# Patient Record
Sex: MALE | Race: WHITE | NOT HISPANIC OR LATINO | Employment: FULL TIME | URBAN - METROPOLITAN AREA
[De-identification: names, ages, dates, MRNs, and addresses within clinical notes are randomized per-mention and may not be internally consistent; named-entity substitution may affect disease eponyms.]

---

## 2017-01-16 ENCOUNTER — APPOINTMENT (OUTPATIENT)
Dept: PHYSICAL THERAPY | Facility: CLINIC | Age: 62
End: 2017-01-16
Payer: COMMERCIAL

## 2017-01-16 PROCEDURE — 97140 MANUAL THERAPY 1/> REGIONS: CPT

## 2017-01-16 PROCEDURE — 97162 PT EVAL MOD COMPLEX 30 MIN: CPT

## 2017-01-24 ENCOUNTER — APPOINTMENT (OUTPATIENT)
Dept: PHYSICAL THERAPY | Facility: CLINIC | Age: 62
End: 2017-01-24
Payer: COMMERCIAL

## 2017-01-24 PROCEDURE — 97140 MANUAL THERAPY 1/> REGIONS: CPT

## 2017-01-24 PROCEDURE — 97110 THERAPEUTIC EXERCISES: CPT

## 2017-01-26 ENCOUNTER — APPOINTMENT (OUTPATIENT)
Dept: PHYSICAL THERAPY | Facility: CLINIC | Age: 62
End: 2017-01-26
Payer: COMMERCIAL

## 2017-01-26 PROCEDURE — 97140 MANUAL THERAPY 1/> REGIONS: CPT

## 2017-01-26 PROCEDURE — 97110 THERAPEUTIC EXERCISES: CPT

## 2017-02-01 ENCOUNTER — APPOINTMENT (OUTPATIENT)
Dept: PHYSICAL THERAPY | Facility: CLINIC | Age: 62
End: 2017-02-01
Payer: COMMERCIAL

## 2017-02-01 PROCEDURE — 97140 MANUAL THERAPY 1/> REGIONS: CPT

## 2017-02-01 PROCEDURE — 97110 THERAPEUTIC EXERCISES: CPT

## 2017-02-02 ENCOUNTER — APPOINTMENT (OUTPATIENT)
Dept: PHYSICAL THERAPY | Facility: CLINIC | Age: 62
End: 2017-02-02
Payer: COMMERCIAL

## 2017-02-06 ENCOUNTER — HOSPITAL ENCOUNTER (OUTPATIENT)
Dept: RADIOLOGY | Facility: HOSPITAL | Age: 62
Discharge: HOME | End: 2017-02-06
Attending: ORTHOPAEDIC SURGERY
Payer: COMMERCIAL

## 2017-02-06 ENCOUNTER — ALLSCRIPTS OFFICE VISIT (OUTPATIENT)
Dept: OTHER | Facility: OTHER | Age: 62
End: 2017-02-06

## 2017-02-06 DIAGNOSIS — M25.521 PAIN IN RIGHT ELBOW: ICD-10-CM

## 2017-02-06 PROCEDURE — 73080 X-RAY EXAM OF ELBOW: CPT

## 2017-02-09 ENCOUNTER — APPOINTMENT (OUTPATIENT)
Dept: PHYSICAL THERAPY | Facility: CLINIC | Age: 62
End: 2017-02-09
Payer: COMMERCIAL

## 2017-02-09 PROCEDURE — 97140 MANUAL THERAPY 1/> REGIONS: CPT

## 2017-02-09 PROCEDURE — 97110 THERAPEUTIC EXERCISES: CPT

## 2017-02-16 ENCOUNTER — APPOINTMENT (OUTPATIENT)
Dept: PHYSICAL THERAPY | Facility: CLINIC | Age: 62
End: 2017-02-16
Payer: COMMERCIAL

## 2017-03-31 ENCOUNTER — GENERIC CONVERSION - ENCOUNTER (OUTPATIENT)
Dept: OTHER | Facility: OTHER | Age: 62
End: 2017-03-31

## 2017-03-31 DIAGNOSIS — M77.11 LATERAL EPICONDYLITIS OF RIGHT ELBOW: ICD-10-CM

## 2017-04-05 ENCOUNTER — HOSPITAL ENCOUNTER (OUTPATIENT)
Dept: MRI IMAGING | Facility: HOSPITAL | Age: 62
Discharge: HOME | End: 2017-04-05
Payer: COMMERCIAL

## 2017-04-05 DIAGNOSIS — M77.11 LATERAL EPICONDYLITIS OF RIGHT ELBOW: ICD-10-CM

## 2017-04-05 PROCEDURE — 73221 MRI JOINT UPR EXTREM W/O DYE: CPT

## 2017-04-24 ENCOUNTER — GENERIC CONVERSION - ENCOUNTER (OUTPATIENT)
Dept: OTHER | Facility: OTHER | Age: 62
End: 2017-04-24

## 2017-05-09 ENCOUNTER — APPOINTMENT (OUTPATIENT)
Dept: OCCUPATIONAL THERAPY | Facility: CLINIC | Age: 62
End: 2017-05-09
Payer: COMMERCIAL

## 2017-05-09 DIAGNOSIS — M77.11 LATERAL EPICONDYLITIS OF RIGHT ELBOW: ICD-10-CM

## 2017-05-09 PROCEDURE — 97165 OT EVAL LOW COMPLEX 30 MIN: CPT

## 2017-05-09 PROCEDURE — 97140 MANUAL THERAPY 1/> REGIONS: CPT

## 2017-05-11 ENCOUNTER — APPOINTMENT (OUTPATIENT)
Dept: OCCUPATIONAL THERAPY | Facility: CLINIC | Age: 62
End: 2017-05-11
Payer: COMMERCIAL

## 2017-05-11 PROCEDURE — 97014 ELECTRIC STIMULATION THERAPY: CPT

## 2017-05-11 PROCEDURE — G0283 ELEC STIM OTHER THAN WOUND: HCPCS

## 2017-05-11 PROCEDURE — 97110 THERAPEUTIC EXERCISES: CPT

## 2017-05-11 PROCEDURE — 97140 MANUAL THERAPY 1/> REGIONS: CPT

## 2017-05-11 PROCEDURE — 97035 APP MDLTY 1+ULTRASOUND EA 15: CPT

## 2017-05-16 ENCOUNTER — APPOINTMENT (OUTPATIENT)
Dept: OCCUPATIONAL THERAPY | Facility: CLINIC | Age: 62
End: 2017-05-16
Payer: COMMERCIAL

## 2017-05-16 PROCEDURE — 97014 ELECTRIC STIMULATION THERAPY: CPT

## 2017-05-16 PROCEDURE — 97110 THERAPEUTIC EXERCISES: CPT

## 2017-05-16 PROCEDURE — 97035 APP MDLTY 1+ULTRASOUND EA 15: CPT

## 2017-05-16 PROCEDURE — 97140 MANUAL THERAPY 1/> REGIONS: CPT

## 2017-05-16 PROCEDURE — G0283 ELEC STIM OTHER THAN WOUND: HCPCS

## 2017-05-18 ENCOUNTER — APPOINTMENT (OUTPATIENT)
Dept: OCCUPATIONAL THERAPY | Facility: CLINIC | Age: 62
End: 2017-05-18
Payer: COMMERCIAL

## 2017-05-18 PROCEDURE — 97035 APP MDLTY 1+ULTRASOUND EA 15: CPT

## 2017-05-18 PROCEDURE — 97140 MANUAL THERAPY 1/> REGIONS: CPT

## 2017-05-18 PROCEDURE — 97110 THERAPEUTIC EXERCISES: CPT

## 2017-05-22 ENCOUNTER — APPOINTMENT (OUTPATIENT)
Dept: OCCUPATIONAL THERAPY | Facility: CLINIC | Age: 62
End: 2017-05-22
Payer: COMMERCIAL

## 2017-05-22 PROCEDURE — 97140 MANUAL THERAPY 1/> REGIONS: CPT

## 2017-05-22 PROCEDURE — 97110 THERAPEUTIC EXERCISES: CPT

## 2017-05-23 ENCOUNTER — APPOINTMENT (OUTPATIENT)
Dept: OCCUPATIONAL THERAPY | Facility: CLINIC | Age: 62
End: 2017-05-23
Payer: COMMERCIAL

## 2017-05-26 ENCOUNTER — APPOINTMENT (OUTPATIENT)
Dept: OCCUPATIONAL THERAPY | Facility: CLINIC | Age: 62
End: 2017-05-26
Payer: COMMERCIAL

## 2017-05-26 PROCEDURE — 97035 APP MDLTY 1+ULTRASOUND EA 15: CPT

## 2017-05-26 PROCEDURE — 97140 MANUAL THERAPY 1/> REGIONS: CPT

## 2017-05-30 ENCOUNTER — APPOINTMENT (OUTPATIENT)
Dept: OCCUPATIONAL THERAPY | Facility: CLINIC | Age: 62
End: 2017-05-30
Payer: COMMERCIAL

## 2017-05-30 PROCEDURE — 97110 THERAPEUTIC EXERCISES: CPT

## 2017-05-30 PROCEDURE — 97035 APP MDLTY 1+ULTRASOUND EA 15: CPT

## 2017-05-30 PROCEDURE — 97140 MANUAL THERAPY 1/> REGIONS: CPT

## 2017-05-31 ENCOUNTER — APPOINTMENT (OUTPATIENT)
Dept: OCCUPATIONAL THERAPY | Facility: CLINIC | Age: 62
End: 2017-05-31
Payer: COMMERCIAL

## 2017-06-06 ENCOUNTER — APPOINTMENT (OUTPATIENT)
Dept: OCCUPATIONAL THERAPY | Facility: CLINIC | Age: 62
End: 2017-06-06
Payer: COMMERCIAL

## 2017-06-06 PROCEDURE — 97035 APP MDLTY 1+ULTRASOUND EA 15: CPT

## 2017-06-06 PROCEDURE — 97140 MANUAL THERAPY 1/> REGIONS: CPT

## 2017-06-06 PROCEDURE — 97110 THERAPEUTIC EXERCISES: CPT

## 2017-06-13 ENCOUNTER — APPOINTMENT (OUTPATIENT)
Dept: OCCUPATIONAL THERAPY | Facility: CLINIC | Age: 62
End: 2017-06-13
Payer: COMMERCIAL

## 2017-06-22 ENCOUNTER — APPOINTMENT (OUTPATIENT)
Dept: OCCUPATIONAL THERAPY | Facility: CLINIC | Age: 62
End: 2017-06-22
Payer: COMMERCIAL

## 2017-07-18 ENCOUNTER — APPOINTMENT (OUTPATIENT)
Dept: PHYSICAL THERAPY | Facility: CLINIC | Age: 62
End: 2017-07-18
Payer: COMMERCIAL

## 2017-07-18 PROCEDURE — 97161 PT EVAL LOW COMPLEX 20 MIN: CPT

## 2017-07-18 PROCEDURE — 97110 THERAPEUTIC EXERCISES: CPT

## 2017-07-20 ENCOUNTER — TRANSCRIBE ORDERS (OUTPATIENT)
Dept: LAB | Facility: HOSPITAL | Age: 62
End: 2017-07-20

## 2017-07-20 ENCOUNTER — APPOINTMENT (OUTPATIENT)
Dept: LAB | Facility: HOSPITAL | Age: 62
End: 2017-07-20
Payer: COMMERCIAL

## 2017-07-20 DIAGNOSIS — Z79.899 ENCOUNTER FOR LONG-TERM (CURRENT) USE OF OTHER MEDICATIONS: ICD-10-CM

## 2017-07-20 DIAGNOSIS — Z85.46 PERSONAL HISTORY OF MALIGNANT NEOPLASM OF PROSTATE: ICD-10-CM

## 2017-07-20 DIAGNOSIS — R73.01 IMPAIRED FASTING GLUCOSE: ICD-10-CM

## 2017-07-20 DIAGNOSIS — E78.00 PURE HYPERCHOLESTEROLEMIA: ICD-10-CM

## 2017-07-20 DIAGNOSIS — E78.00 PURE HYPERCHOLESTEROLEMIA: Primary | ICD-10-CM

## 2017-07-20 LAB
ALBUMIN SERPL BCP-MCNC: 3.9 G/DL (ref 3.5–5)
ALP SERPL-CCNC: 52 U/L (ref 46–116)
ALT SERPL W P-5'-P-CCNC: 23 U/L (ref 12–78)
ANION GAP SERPL CALCULATED.3IONS-SCNC: 4 MMOL/L (ref 4–13)
AST SERPL W P-5'-P-CCNC: 14 U/L (ref 5–45)
BACTERIA UR QL AUTO: NORMAL /HPF
BASOPHILS # BLD AUTO: 0.02 THOUSANDS/ΜL (ref 0–0.1)
BASOPHILS NFR BLD AUTO: 0 % (ref 0–1)
BILIRUB SERPL-MCNC: 0.82 MG/DL (ref 0.2–1)
BILIRUB UR QL STRIP: NEGATIVE
BUN SERPL-MCNC: 20 MG/DL (ref 5–25)
CALCIUM SERPL-MCNC: 9.1 MG/DL (ref 8.3–10.1)
CHLORIDE SERPL-SCNC: 103 MMOL/L (ref 100–108)
CHOLEST SERPL-MCNC: 224 MG/DL (ref 50–200)
CK SERPL-CCNC: 73 U/L (ref 39–308)
CLARITY UR: CLEAR
CO2 SERPL-SCNC: 31 MMOL/L (ref 21–32)
COLOR UR: YELLOW
CREAT SERPL-MCNC: 1.22 MG/DL (ref 0.6–1.3)
CREAT UR-MCNC: 59.5 MG/DL
EOSINOPHIL # BLD AUTO: 0.08 THOUSAND/ΜL (ref 0–0.61)
EOSINOPHIL NFR BLD AUTO: 1 % (ref 0–6)
ERYTHROCYTE [DISTWIDTH] IN BLOOD BY AUTOMATED COUNT: 12.2 % (ref 11.6–15.1)
EST. AVERAGE GLUCOSE BLD GHB EST-MCNC: 120 MG/DL
GFR SERPL CREATININE-BSD FRML MDRD: 60 ML/MIN/1.73SQ M
GLUCOSE P FAST SERPL-MCNC: 95 MG/DL (ref 65–99)
GLUCOSE UR STRIP-MCNC: NEGATIVE MG/DL
HBA1C MFR BLD: 5.8 % (ref 4.2–6.3)
HCT VFR BLD AUTO: 43.4 % (ref 36.5–49.3)
HDLC SERPL-MCNC: 90 MG/DL (ref 40–60)
HGB BLD-MCNC: 14.3 G/DL (ref 12–17)
HGB UR QL STRIP.AUTO: ABNORMAL
HYALINE CASTS #/AREA URNS LPF: NORMAL /LPF
KETONES UR STRIP-MCNC: NEGATIVE MG/DL
LDLC SERPL CALC-MCNC: 115 MG/DL (ref 0–100)
LEUKOCYTE ESTERASE UR QL STRIP: NEGATIVE
LYMPHOCYTES # BLD AUTO: 1.54 THOUSANDS/ΜL (ref 0.6–4.47)
LYMPHOCYTES NFR BLD AUTO: 28 % (ref 14–44)
MCH RBC QN AUTO: 29.8 PG (ref 26.8–34.3)
MCHC RBC AUTO-ENTMCNC: 32.9 G/DL (ref 31.4–37.4)
MCV RBC AUTO: 90 FL (ref 82–98)
MICROALBUMIN UR-MCNC: 5.6 MG/L (ref 0–20)
MICROALBUMIN/CREAT 24H UR: 9 MG/G CREATININE (ref 0–30)
MONOCYTES # BLD AUTO: 0.46 THOUSAND/ΜL (ref 0.17–1.22)
MONOCYTES NFR BLD AUTO: 8 % (ref 4–12)
NEUTROPHILS # BLD AUTO: 3.43 THOUSANDS/ΜL (ref 1.85–7.62)
NEUTS SEG NFR BLD AUTO: 63 % (ref 43–75)
NITRITE UR QL STRIP: NEGATIVE
NON-SQ EPI CELLS URNS QL MICRO: NORMAL /HPF
NRBC BLD AUTO-RTO: 0 /100 WBCS
PH UR STRIP.AUTO: 6.5 [PH] (ref 4.5–8)
PLATELET # BLD AUTO: 239 THOUSANDS/UL (ref 149–390)
PMV BLD AUTO: 9.3 FL (ref 8.9–12.7)
POTASSIUM SERPL-SCNC: 3.9 MMOL/L (ref 3.5–5.3)
PROT SERPL-MCNC: 7.3 G/DL (ref 6.4–8.2)
PROT UR STRIP-MCNC: NEGATIVE MG/DL
PSA SERPL-MCNC: <0.1 NG/ML (ref 0–4)
RBC # BLD AUTO: 4.8 MILLION/UL (ref 3.88–5.62)
RBC #/AREA URNS AUTO: NORMAL /HPF
SODIUM SERPL-SCNC: 138 MMOL/L (ref 136–145)
SP GR UR STRIP.AUTO: 1.01 (ref 1–1.03)
TRIGL SERPL-MCNC: 95 MG/DL
UROBILINOGEN UR QL STRIP.AUTO: 0.2 E.U./DL
WBC # BLD AUTO: 5.56 THOUSAND/UL (ref 4.31–10.16)
WBC #/AREA URNS AUTO: NORMAL /HPF

## 2017-07-20 PROCEDURE — 82570 ASSAY OF URINE CREATININE: CPT | Performed by: FAMILY MEDICINE

## 2017-07-20 PROCEDURE — 83036 HEMOGLOBIN GLYCOSYLATED A1C: CPT

## 2017-07-20 PROCEDURE — 80061 LIPID PANEL: CPT

## 2017-07-20 PROCEDURE — 36415 COLL VENOUS BLD VENIPUNCTURE: CPT

## 2017-07-20 PROCEDURE — G0103 PSA SCREENING: HCPCS

## 2017-07-20 PROCEDURE — 82550 ASSAY OF CK (CPK): CPT

## 2017-07-20 PROCEDURE — 80053 COMPREHEN METABOLIC PANEL: CPT

## 2017-07-20 PROCEDURE — 82043 UR ALBUMIN QUANTITATIVE: CPT | Performed by: FAMILY MEDICINE

## 2017-07-20 PROCEDURE — 81001 URINALYSIS AUTO W/SCOPE: CPT | Performed by: FAMILY MEDICINE

## 2017-07-20 PROCEDURE — 85025 COMPLETE CBC W/AUTO DIFF WBC: CPT

## 2017-07-28 ENCOUNTER — APPOINTMENT (OUTPATIENT)
Dept: PHYSICAL THERAPY | Facility: CLINIC | Age: 62
End: 2017-07-28
Payer: COMMERCIAL

## 2017-07-28 PROCEDURE — 97140 MANUAL THERAPY 1/> REGIONS: CPT

## 2017-07-28 PROCEDURE — 97035 APP MDLTY 1+ULTRASOUND EA 15: CPT

## 2017-07-28 PROCEDURE — 97110 THERAPEUTIC EXERCISES: CPT

## 2017-08-01 ENCOUNTER — APPOINTMENT (OUTPATIENT)
Dept: PHYSICAL THERAPY | Facility: CLINIC | Age: 62
End: 2017-08-01
Payer: COMMERCIAL

## 2017-08-16 ENCOUNTER — APPOINTMENT (OUTPATIENT)
Dept: LAB | Facility: HOSPITAL | Age: 62
End: 2017-08-16
Payer: COMMERCIAL

## 2017-08-16 ENCOUNTER — APPOINTMENT (OUTPATIENT)
Dept: PHYSICAL THERAPY | Facility: CLINIC | Age: 62
End: 2017-08-16
Payer: COMMERCIAL

## 2017-08-16 ENCOUNTER — TRANSCRIBE ORDERS (OUTPATIENT)
Dept: ADMINISTRATIVE | Facility: HOSPITAL | Age: 62
End: 2017-08-16

## 2017-08-16 DIAGNOSIS — R79.89 OTHER ABNORMAL BLOOD CHEMISTRY: ICD-10-CM

## 2017-08-16 DIAGNOSIS — R79.89 OTHER ABNORMAL BLOOD CHEMISTRY: Primary | ICD-10-CM

## 2017-08-16 LAB
ANION GAP SERPL CALCULATED.3IONS-SCNC: 7 MMOL/L (ref 4–13)
BUN SERPL-MCNC: 19 MG/DL (ref 5–25)
CALCIUM SERPL-MCNC: 8.7 MG/DL (ref 8.3–10.1)
CHLORIDE SERPL-SCNC: 103 MMOL/L (ref 100–108)
CO2 SERPL-SCNC: 31 MMOL/L (ref 21–32)
CREAT SERPL-MCNC: 1.19 MG/DL (ref 0.6–1.3)
GFR SERPL CREATININE-BSD FRML MDRD: 65 ML/MIN/1.73SQ M
GLUCOSE P FAST SERPL-MCNC: 87 MG/DL (ref 65–99)
POTASSIUM SERPL-SCNC: 3.7 MMOL/L (ref 3.5–5.3)
SODIUM SERPL-SCNC: 141 MMOL/L (ref 136–145)

## 2017-08-16 PROCEDURE — 80048 BASIC METABOLIC PNL TOTAL CA: CPT

## 2017-08-16 PROCEDURE — 97035 APP MDLTY 1+ULTRASOUND EA 15: CPT

## 2017-08-16 PROCEDURE — 36415 COLL VENOUS BLD VENIPUNCTURE: CPT

## 2017-08-16 PROCEDURE — 97140 MANUAL THERAPY 1/> REGIONS: CPT

## 2017-12-15 ENCOUNTER — ALLSCRIPTS OFFICE VISIT (OUTPATIENT)
Dept: OTHER | Facility: OTHER | Age: 62
End: 2017-12-15

## 2018-01-10 ENCOUNTER — APPOINTMENT (OUTPATIENT)
Dept: PHYSICAL THERAPY | Facility: CLINIC | Age: 63
End: 2018-01-10
Payer: COMMERCIAL

## 2018-01-10 PROCEDURE — G8990 OTHER PT/OT CURRENT STATUS: HCPCS

## 2018-01-10 PROCEDURE — 97161 PT EVAL LOW COMPLEX 20 MIN: CPT

## 2018-01-10 PROCEDURE — 97140 MANUAL THERAPY 1/> REGIONS: CPT

## 2018-01-10 PROCEDURE — G8991 OTHER PT/OT GOAL STATUS: HCPCS

## 2018-01-14 VITALS — HEART RATE: 58 BPM | DIASTOLIC BLOOD PRESSURE: 75 MMHG | WEIGHT: 187.25 LBS | SYSTOLIC BLOOD PRESSURE: 119 MMHG

## 2018-01-15 ENCOUNTER — APPOINTMENT (OUTPATIENT)
Dept: PHYSICAL THERAPY | Facility: CLINIC | Age: 63
End: 2018-01-15
Payer: COMMERCIAL

## 2018-01-16 NOTE — MISCELLANEOUS
Message  Patient had improvement with treatment, however symptoms returned localized to lateral epicondyle  He is indicated for an MRI of the right elbow  Will review results when it is preformed  Plan  Lateral epicondylitis of right elbow    · * MRI ELBOW RIGHT WO CONTRAST; Status:Need Information - Financial  Authorization;  Requested O:[id number];     Signatures   Electronically signed by : TJ Arevalo ; Mar 31 2017  1:23PM EST                       (Author)

## 2018-01-17 NOTE — CONSULTS
Therapy  Rehabilitation Services Referral: Therapy Location: Occupational Therapy  Patient Status: routine  Diagnosis: Right elbow lateral epicondylitis  Rehabilitation Services: evaluate and treat patient as needed and initiate a home exercise program  Modalities: Local modalities at the therapist's discretion  Exercise/Treatment: AROM/PROM, stretching, elbow, wrist/hand, eccentric, stabilization and strengthening/PRE  Program: home program    Frequency: 1-3 times per week, for 8 weeks  Please send progress report  I hereby certify that the services indicated above are medically necessary        Signatures   Electronically signed by : TJ Ferrari ; Apr 24 2017  1:23PM EST                       (Author)

## 2018-01-18 NOTE — PROGRESS NOTES
Chief Complaint  Patient here for EKG s/p initiation of Flecainide 50mg b i d  X3 weeks  Patient feeling much better with fewer palpitations  BP today is 132/82  HR 57  Patient on no other cardiac medication  Active Problems    1  Forehead abrasion (910 0) (S00 81XA)   2  Groin strain (848 8) (S76 219A)   3  Head injury (959 01) (S09 90XA)   4  Hypercholesterolemia (272 0) (E78 00)   5  Palpitations (785 1) (R00 2)   6  Premature atrial contractions (427 61) (I49 1)   7  Prostate cancer (185) (C61)   8  Right foot pain (729 5) (M79 671)   9  Rotator cuff tendinitis, left (726 10) (M75 82)   10  Strain of calf muscle (844 8) (B26 289B)    Current Meds   1  Aspirin Childrens 81 MG Oral Tablet Chewable; Therapy: (Recorded:98Qjv2659) to Recorded   2  Crestor 5 MG Oral Tablet; take 1 tablet 3 times per week; Therapy: (Recorded:41Jnb1148) to Recorded   3  Fish Oil CAPS; Therapy: (Recorded:40Ekn7301) to Recorded   4  Flecainide Acetate 50 MG Oral Tablet; Take 1 tablet twice daily; Therapy: 08RSE3567 to (Evaluate:62Heh9713)  Requested for: [id number]; Last   Rx:94Bcg7271 Ordered   5  Vitamin C 500 MG Oral Capsule; take 1 capsule daily; Therapy: 18ESM9875 to Recorded    Allergies    1   No Known Drug Allergies    Vitals  Signs    Systolic: 902, LUE, Sitting  Diastolic: 82, LUE, Sitting  Heart Rate: 57    Plan  Premature atrial contractions    · EKG/ECG- POC; Status:Complete;   Done: 92IIM4865    Signatures   Electronically signed by : Moncho Pena, ; Nov 7 2016  3:14PM EST                       (Author)    Electronically signed by : Hudson Ohara DO; Nov 15 2016  7:47PM EST                       (Author)

## 2018-01-23 NOTE — MISCELLANEOUS
Provider Comments  Provider Comments:   Pt was a no-show for today's apt        Signatures   Electronically signed by : Cee Francisco, ; Dec 15 2017  4:40PM EST                       (Administrative)

## 2018-04-17 ENCOUNTER — OFFICE VISIT (OUTPATIENT)
Dept: CARDIOLOGY CLINIC | Facility: CLINIC | Age: 63
End: 2018-04-17
Payer: COMMERCIAL

## 2018-04-17 VITALS
WEIGHT: 190 LBS | DIASTOLIC BLOOD PRESSURE: 72 MMHG | HEIGHT: 70 IN | BODY MASS INDEX: 27.2 KG/M2 | SYSTOLIC BLOOD PRESSURE: 128 MMHG

## 2018-04-17 DIAGNOSIS — I48.91 ATRIAL FIBRILLATION, UNSPECIFIED TYPE (HCC): Primary | ICD-10-CM

## 2018-04-17 PROCEDURE — 93000 ELECTROCARDIOGRAM COMPLETE: CPT | Performed by: INTERNAL MEDICINE

## 2018-04-17 PROCEDURE — 99213 OFFICE O/P EST LOW 20 MIN: CPT | Performed by: INTERNAL MEDICINE

## 2018-04-17 RX ORDER — ASPIRIN 81 MG/1
TABLET, CHEWABLE ORAL
COMMUNITY

## 2018-04-17 RX ORDER — FLECAINIDE ACETATE 50 MG/1
50 TABLET ORAL 2 TIMES DAILY
Refills: 3 | COMMUNITY
Start: 2018-03-01 | End: 2018-12-07 | Stop reason: SDUPTHER

## 2018-04-17 RX ORDER — FLECAINIDE ACETATE 50 MG/1
1 TABLET ORAL 2 TIMES DAILY
COMMUNITY
Start: 2016-10-17 | End: 2018-04-17 | Stop reason: SDUPTHER

## 2018-04-17 RX ORDER — ROSUVASTATIN CALCIUM 5 MG/1
TABLET, COATED ORAL
COMMUNITY
End: 2021-03-08 | Stop reason: SDUPTHER

## 2018-04-17 RX ORDER — ROSUVASTATIN CALCIUM 5 MG/1
TABLET, COATED ORAL
Refills: 1 | COMMUNITY
Start: 2018-03-31 | End: 2018-04-17 | Stop reason: SDUPTHER

## 2018-04-17 RX ORDER — AMOXICILLIN 500 MG
CAPSULE ORAL DAILY
COMMUNITY
End: 2021-01-20 | Stop reason: ALTCHOICE

## 2018-04-17 RX ORDER — OXYCODONE HYDROCHLORIDE AND ACETAMINOPHEN 5; 325 MG/1; MG/1
1 TABLET ORAL
Refills: 0 | COMMUNITY
Start: 2018-02-23 | End: 2018-04-17 | Stop reason: ALTCHOICE

## 2018-04-17 RX ORDER — MULTIVIT WITH MINERALS/LUTEIN
1 TABLET ORAL DAILY
COMMUNITY
Start: 2016-11-07 | End: 2021-01-20 | Stop reason: ALTCHOICE

## 2018-04-17 RX ORDER — AMOXICILLIN 500 MG/1
CAPSULE ORAL
Refills: 0 | COMMUNITY
Start: 2018-02-23 | End: 2018-04-17 | Stop reason: ALTCHOICE

## 2018-04-17 NOTE — PROGRESS NOTES
Cardiology Follow Up    Jennifer Castellanos  1955  [id number]  HEART & VASCULAR [address] [facility]  ST [address] [facility]  [address] [facility] [address]    1  Atrial fibrillation, unspecified type ([address] )  POCT ECG       Interval History: feels great one bout of palpitations in last 6 months  There is no problem list on file for this patient  No past medical history on file  Social History     Social History    Marital status: /Civil Union     Spouse name: N/A    Number of children: N/A    Years of education: N/A     Occupational History    Not on file  Social History Main Topics    Smoking status: Not on file    Smokeless tobacco: Not on file    Alcohol use Not on file    Drug use: Unknown    Sexual activity: Not on file     Other Topics Concern    Not on file     Social History Narrative    No narrative on file      No family history on file  No past surgical history on file  Current Outpatient Prescriptions:     Ascorbic Acid (VITAMIN C) 1000 MG tablet, Take 1 capsule by mouth daily, Disp: , Rfl:     aspirin 81 mg chewable tablet, Chew, Disp: , Rfl:     flecainide (TAMBOCOR) 50 mg tablet, Take 50 mg by mouth 2 (two) times a day, Disp: , Rfl: 3    Omega-3 Fatty Acids (FISH OIL) 1200 MG CAPS, Take by mouth daily  , Disp: , Rfl:     rosuvastatin (CRESTOR) 5 mg tablet, Take by mouth, Disp: , Rfl:   No Known Allergies    Labs:  No visits with results within 6 Month(s) from this visit  Latest known visit with results is:   Appointment on 08/16/2017   Component Date Value    Sodium 08/16/2017 141     Potassium 08/16/2017 3 7     Chloride 08/16/2017 103     CO2 08/16/2017 31     Anion Gap 08/16/2017 7     BUN 08/16/2017 19     Creatinine 08/16/2017 1 19     Glucose, Fasting 08/16/2017 87     Calcium 08/16/2017 8 7     eGFR 08/16/2017 65      Imaging: No results found      Review of Systems:  Review of Systems    10 point ROS negative for complaints      Physical Exam:  Physical Exam    GEN: NAD, Alert and oriented, well appearing  HEENT:Head, neck, ears, oral pharynx: Mucus membranes moist, oral pharynx clear, nares clear  External ears normal  EYES: Pupils equal, sclera anicteric  NECK: No JVD  CARDIOVASCULAR: RRR, No murmur, rub, gallops S1,S2  LUNGS: Clear To auscultation bilaterally  ABDOMEN: Soft, nondistended  EXTREMITIES/VASCULAR: No edema  PSYCH: Normal Affect  NEURO: Grossly intact, moving all extremiteis equal, face symetric  HEME: No bleeding, bruising, petechia  SKIN: No significant rashes      Discussion/Summary:1) PACs frequent completely suppressed  Continue flecainide 50 twice daily  Ok to go to once daily on weeks off if he wants try getting off medication  Will get stress echo and annual follow-up

## 2018-05-14 DIAGNOSIS — F51.02 INSOMNIA, TRANSIENT: Primary | ICD-10-CM

## 2018-05-14 RX ORDER — ZOLPIDEM TARTRATE 5 MG/1
5 TABLET ORAL
Qty: 30 TABLET | Refills: 0 | Status: SHIPPED | OUTPATIENT
Start: 2018-05-14 | End: 2020-01-31 | Stop reason: ALTCHOICE

## 2018-06-17 ENCOUNTER — OFFICE VISIT (OUTPATIENT)
Dept: URGENT CARE | Facility: CLINIC | Age: 63
End: 2018-06-17
Payer: COMMERCIAL

## 2018-06-17 VITALS
TEMPERATURE: 97.1 F | HEART RATE: 63 BPM | OXYGEN SATURATION: 97 % | BODY MASS INDEX: 27.55 KG/M2 | DIASTOLIC BLOOD PRESSURE: 86 MMHG | RESPIRATION RATE: 16 BRPM | SYSTOLIC BLOOD PRESSURE: 130 MMHG | HEIGHT: 69 IN | WEIGHT: 186 LBS

## 2018-06-17 DIAGNOSIS — H10.31 ACUTE BACTERIAL CONJUNCTIVITIS OF RIGHT EYE: Primary | ICD-10-CM

## 2018-06-17 DIAGNOSIS — J32.9 VIRAL SINUSITIS: ICD-10-CM

## 2018-06-17 DIAGNOSIS — B97.89 VIRAL SINUSITIS: ICD-10-CM

## 2018-06-17 PROCEDURE — 99213 OFFICE O/P EST LOW 20 MIN: CPT | Performed by: PHYSICIAN ASSISTANT

## 2018-06-17 RX ORDER — TOBRAMYCIN 3 MG/ML
1 SOLUTION/ DROPS OPHTHALMIC
Qty: 1 BOTTLE | Refills: 0 | Status: SHIPPED | OUTPATIENT
Start: 2018-06-17 | End: 2019-06-05 | Stop reason: ALTCHOICE

## 2018-06-17 NOTE — PATIENT INSTRUCTIONS
Use eyedrops as directed  Apply warm compresses as needed for comfort   Take antihistamine  F/u with PCP if no improvement in 3-4 days

## 2018-06-17 NOTE — PROGRESS NOTES
NAME: Fernanda Turner is a 57 y o  male  : 1955    MRN: [id number]      Assessment and Plan   Acute bacterial conjunctivitis of right eye [H10 31]  1  Acute bacterial conjunctivitis of right eye  tobramycin (TOBREX) 0 3 % SOLN   2  Viral sinusitis             Patient Instructions   Patient Instructions   Use eyedrops as directed  Apply warm compresses as needed for comfort   Take antihistamine  F/u with PCP if no improvement in 3-4 days    Proceed to ER if symptoms worsen  History of Present Illness     Patient presents complaining of 1 day hx of crusty and itchy eyes R>L  He states he has had a cough and congestion for a few days and has been taking mucinex but noticed his eye yesterday starting to become red and itchy  He work up this am and had trouble opening his right eye due to the crusting  He states his daughter recently had pink eye  He denies fevers, chills  Review of Systems   Review of Systems   Constitutional: Negative for chills and fever  HENT: Positive for congestion and postnasal drip  Negative for ear pain  Eyes: Positive for discharge, redness and itching  Negative for visual disturbance  Respiratory: Positive for cough  Negative for shortness of breath, wheezing and stridor            Current Medications       Current Outpatient Prescriptions:     Ascorbic Acid (VITAMIN C) 1000 MG tablet, Take 1 capsule by mouth daily, Disp: , Rfl:     aspirin 81 mg chewable tablet, Chew, Disp: , Rfl:     flecainide (TAMBOCOR) 50 mg tablet, Take 50 mg by mouth 2 (two) times a day, Disp: , Rfl: 3    Omega-3 Fatty Acids (FISH OIL) 1200 MG CAPS, Take by mouth daily  , Disp: , Rfl:     rosuvastatin (CRESTOR) 5 mg tablet, Take by mouth, Disp: , Rfl:     tobramycin (TOBREX) 0 3 % SOLN, Administer 1 drop to both eyes every 4 (four) hours while awake, Disp: 1 Bottle, Rfl: 0    zolpidem (AMBIEN) 5 mg tablet, Take 1 tablet (5 mg total) by mouth daily at bedtime as needed for sleep for up to 10 doses, Disp: 30 tablet, Rfl: 0    Current Allergies     Allergies as of 06/17/2018    (No Known Allergies)              No past medical history on file  No past surgical history on file  No family history on file  Medications have been verified  The following portions of the patient's history were reviewed and updated as appropriate: allergies, current medications, past family history, past medical history, past social history, past surgical history and problem list     Objective   /86   Pulse 63   Temp (!) 97 1 °F (36 2 °C)   Resp 16   Ht 5' 9" (1 753 m)   Wt 84 4 kg (186 lb)   SpO2 97%   BMI 27 47 kg/m²      Physical Exam     Physical Exam   Constitutional: He appears well-developed and well-nourished  No distress  HENT:   TMs clear b/l  Nasal mucosa pale and mildly edematous  Clear rhinorrhea  Oropharynx clear without edema or exudate  +PND   Eyes:   Right eye: conjunctiva erythematous with mucopurulent discharge  Slightly injected  Left eye: conjunctiva erythematous but without mucopurulent discharge  Yellow crusting noted along the lid margin

## 2018-07-30 ENCOUNTER — APPOINTMENT (OUTPATIENT)
Dept: LAB | Facility: CLINIC | Age: 63
End: 2018-07-30
Payer: COMMERCIAL

## 2018-07-30 ENCOUNTER — TRANSCRIBE ORDERS (OUTPATIENT)
Dept: LAB | Facility: CLINIC | Age: 63
End: 2018-07-30

## 2018-07-30 DIAGNOSIS — Z79.899 LONG TERM USE OF DRUG: ICD-10-CM

## 2018-07-30 DIAGNOSIS — E78.00 PURE HYPERCHOLESTEROLEMIA: ICD-10-CM

## 2018-07-30 DIAGNOSIS — R73.01 IMPAIRED FASTING GLUCOSE: Primary | ICD-10-CM

## 2018-07-30 DIAGNOSIS — Z85.46 PERSONAL HISTORY OF MALIGNANT NEOPLASM OF PROSTATE: ICD-10-CM

## 2018-07-30 DIAGNOSIS — R73.01 IMPAIRED FASTING GLUCOSE: ICD-10-CM

## 2018-07-30 DIAGNOSIS — R79.89 HYPOURICEMIA: ICD-10-CM

## 2018-07-30 LAB
ALBUMIN SERPL BCP-MCNC: 4.3 G/DL (ref 3.5–5)
ALP SERPL-CCNC: 55 U/L (ref 46–116)
ALT SERPL W P-5'-P-CCNC: 32 U/L (ref 12–78)
ANION GAP SERPL CALCULATED.3IONS-SCNC: 7 MMOL/L (ref 4–13)
AST SERPL W P-5'-P-CCNC: 21 U/L (ref 5–45)
BACTERIA UR QL AUTO: NORMAL /HPF
BASOPHILS # BLD AUTO: 0.02 THOUSANDS/ΜL (ref 0–0.1)
BASOPHILS NFR BLD AUTO: 0 % (ref 0–1)
BILIRUB SERPL-MCNC: 0.76 MG/DL (ref 0.2–1)
BILIRUB UR QL STRIP: NEGATIVE
BUN SERPL-MCNC: 18 MG/DL (ref 5–25)
CALCIUM SERPL-MCNC: 9.1 MG/DL (ref 8.3–10.1)
CHLORIDE SERPL-SCNC: 101 MMOL/L (ref 100–108)
CHOLEST SERPL-MCNC: 197 MG/DL (ref 50–200)
CK SERPL-CCNC: 115 U/L (ref 39–308)
CLARITY UR: CLEAR
CO2 SERPL-SCNC: 27 MMOL/L (ref 21–32)
COLOR UR: YELLOW
CREAT SERPL-MCNC: 1.51 MG/DL (ref 0.6–1.3)
CREAT UR-MCNC: 14.9 MG/DL
EOSINOPHIL # BLD AUTO: 0.09 THOUSAND/ΜL (ref 0–0.61)
EOSINOPHIL NFR BLD AUTO: 2 % (ref 0–6)
ERYTHROCYTE [DISTWIDTH] IN BLOOD BY AUTOMATED COUNT: 12.1 % (ref 11.6–15.1)
EST. AVERAGE GLUCOSE BLD GHB EST-MCNC: 117 MG/DL
GFR SERPL CREATININE-BSD FRML MDRD: 48 ML/MIN/1.73SQ M
GLUCOSE P FAST SERPL-MCNC: 104 MG/DL (ref 65–99)
GLUCOSE UR STRIP-MCNC: NEGATIVE MG/DL
HBA1C MFR BLD: 5.7 % (ref 4.2–6.3)
HCT VFR BLD AUTO: 45.8 % (ref 36.5–49.3)
HDLC SERPL-MCNC: 81 MG/DL (ref 40–60)
HGB BLD-MCNC: 14.5 G/DL (ref 12–17)
HGB UR QL STRIP.AUTO: NEGATIVE
HYALINE CASTS #/AREA URNS LPF: NORMAL /LPF
IMM GRANULOCYTES # BLD AUTO: 0.04 THOUSAND/UL (ref 0–0.2)
IMM GRANULOCYTES NFR BLD AUTO: 1 % (ref 0–2)
KETONES UR STRIP-MCNC: NEGATIVE MG/DL
LDLC SERPL CALC-MCNC: 98 MG/DL (ref 0–100)
LEUKOCYTE ESTERASE UR QL STRIP: NEGATIVE
LYMPHOCYTES # BLD AUTO: 1.61 THOUSANDS/ΜL (ref 0.6–4.47)
LYMPHOCYTES NFR BLD AUTO: 30 % (ref 14–44)
MCH RBC QN AUTO: 29.2 PG (ref 26.8–34.3)
MCHC RBC AUTO-ENTMCNC: 31.7 G/DL (ref 31.4–37.4)
MCV RBC AUTO: 92 FL (ref 82–98)
MICROALBUMIN UR-MCNC: <5 MG/L (ref 0–20)
MICROALBUMIN/CREAT 24H UR: <34 MG/G CREATININE (ref 0–30)
MONOCYTES # BLD AUTO: 0.58 THOUSAND/ΜL (ref 0.17–1.22)
MONOCYTES NFR BLD AUTO: 11 % (ref 4–12)
NEUTROPHILS # BLD AUTO: 3.08 THOUSANDS/ΜL (ref 1.85–7.62)
NEUTS SEG NFR BLD AUTO: 56 % (ref 43–75)
NITRITE UR QL STRIP: NEGATIVE
NON-SQ EPI CELLS URNS QL MICRO: NORMAL /HPF
NONHDLC SERPL-MCNC: 116 MG/DL
NRBC BLD AUTO-RTO: 0 /100 WBCS
PH UR STRIP.AUTO: 6.5 [PH] (ref 4.5–8)
PLATELET # BLD AUTO: 284 THOUSANDS/UL (ref 149–390)
PMV BLD AUTO: 9.4 FL (ref 8.9–12.7)
POTASSIUM SERPL-SCNC: 4.1 MMOL/L (ref 3.5–5.3)
PROT SERPL-MCNC: 7.7 G/DL (ref 6.4–8.2)
PROT UR STRIP-MCNC: NEGATIVE MG/DL
PSA SERPL-MCNC: <0.1 NG/ML (ref 0–4)
RBC # BLD AUTO: 4.97 MILLION/UL (ref 3.88–5.62)
RBC #/AREA URNS AUTO: NORMAL /HPF
SODIUM SERPL-SCNC: 135 MMOL/L (ref 136–145)
SP GR UR STRIP.AUTO: 1 (ref 1–1.03)
TRIGL SERPL-MCNC: 88 MG/DL
UROBILINOGEN UR QL STRIP.AUTO: 0.2 E.U./DL
WBC # BLD AUTO: 5.42 THOUSAND/UL (ref 4.31–10.16)
WBC #/AREA URNS AUTO: NORMAL /HPF

## 2018-07-30 PROCEDURE — 80053 COMPREHEN METABOLIC PANEL: CPT

## 2018-07-30 PROCEDURE — 36415 COLL VENOUS BLD VENIPUNCTURE: CPT

## 2018-07-30 PROCEDURE — 81001 URINALYSIS AUTO W/SCOPE: CPT

## 2018-07-30 PROCEDURE — G0103 PSA SCREENING: HCPCS

## 2018-07-30 PROCEDURE — 85025 COMPLETE CBC W/AUTO DIFF WBC: CPT

## 2018-07-30 PROCEDURE — 82570 ASSAY OF URINE CREATININE: CPT

## 2018-07-30 PROCEDURE — 80061 LIPID PANEL: CPT

## 2018-07-30 PROCEDURE — 83036 HEMOGLOBIN GLYCOSYLATED A1C: CPT

## 2018-07-30 PROCEDURE — 82550 ASSAY OF CK (CPK): CPT

## 2018-07-30 PROCEDURE — 82043 UR ALBUMIN QUANTITATIVE: CPT

## 2018-08-09 ENCOUNTER — APPOINTMENT (OUTPATIENT)
Dept: LAB | Facility: HOSPITAL | Age: 63
End: 2018-08-09
Payer: COMMERCIAL

## 2018-08-09 DIAGNOSIS — Z79.899 LONG TERM USE OF DRUG: ICD-10-CM

## 2018-08-09 DIAGNOSIS — Z85.46 PERSONAL HISTORY OF MALIGNANT NEOPLASM OF PROSTATE: ICD-10-CM

## 2018-08-09 DIAGNOSIS — E78.00 PURE HYPERCHOLESTEROLEMIA: ICD-10-CM

## 2018-08-09 DIAGNOSIS — R79.89 HYPOURICEMIA: ICD-10-CM

## 2018-08-09 DIAGNOSIS — R73.01 IMPAIRED FASTING GLUCOSE: ICD-10-CM

## 2018-08-09 LAB
ANION GAP SERPL CALCULATED.3IONS-SCNC: 6 MMOL/L (ref 4–13)
BACTERIA UR QL AUTO: NORMAL /HPF
BILIRUB UR QL STRIP: NEGATIVE
BUN SERPL-MCNC: 20 MG/DL (ref 5–25)
CALCIUM SERPL-MCNC: 8.8 MG/DL (ref 8.3–10.1)
CHLORIDE SERPL-SCNC: 104 MMOL/L (ref 100–108)
CK SERPL-CCNC: 77 U/L (ref 39–308)
CLARITY UR: CLEAR
CO2 SERPL-SCNC: 28 MMOL/L (ref 21–32)
COLOR UR: YELLOW
CREAT SERPL-MCNC: 1.19 MG/DL (ref 0.6–1.3)
CREAT UR-MCNC: 217 MG/DL
GFR SERPL CREATININE-BSD FRML MDRD: 64 ML/MIN/1.73SQ M
GLUCOSE SERPL-MCNC: 118 MG/DL (ref 65–140)
GLUCOSE UR STRIP-MCNC: NEGATIVE MG/DL
HGB UR QL STRIP.AUTO: NEGATIVE
HYALINE CASTS #/AREA URNS LPF: NORMAL /LPF
KETONES UR STRIP-MCNC: NEGATIVE MG/DL
LEUKOCYTE ESTERASE UR QL STRIP: NEGATIVE
NITRITE UR QL STRIP: NEGATIVE
NON-SQ EPI CELLS URNS QL MICRO: NORMAL /HPF
PH UR STRIP.AUTO: 5 [PH] (ref 4.5–8)
POTASSIUM SERPL-SCNC: 4 MMOL/L (ref 3.5–5.3)
PROT UR STRIP-MCNC: NEGATIVE MG/DL
PROT UR-MCNC: 22 MG/DL
PROT/CREAT UR: 0.1 MG/G{CREAT} (ref 0–0.1)
RBC #/AREA URNS AUTO: NORMAL /HPF
SODIUM SERPL-SCNC: 138 MMOL/L (ref 136–145)
SP GR UR STRIP.AUTO: 1.02 (ref 1–1.03)
UROBILINOGEN UR QL STRIP.AUTO: 0.2 E.U./DL
WBC #/AREA URNS AUTO: NORMAL /HPF

## 2018-08-09 PROCEDURE — 84156 ASSAY OF PROTEIN URINE: CPT

## 2018-08-09 PROCEDURE — 36415 COLL VENOUS BLD VENIPUNCTURE: CPT

## 2018-08-09 PROCEDURE — 82570 ASSAY OF URINE CREATININE: CPT

## 2018-08-09 PROCEDURE — 82550 ASSAY OF CK (CPK): CPT

## 2018-08-09 PROCEDURE — 80048 BASIC METABOLIC PNL TOTAL CA: CPT

## 2018-08-09 PROCEDURE — 81001 URINALYSIS AUTO W/SCOPE: CPT

## 2018-08-17 ENCOUNTER — OFFICE VISIT (OUTPATIENT)
Dept: PHYSICAL THERAPY | Facility: CLINIC | Age: 63
End: 2018-08-17
Payer: COMMERCIAL

## 2018-08-17 DIAGNOSIS — S76.302D LEFT HAMSTRING INJURY, SUBSEQUENT ENCOUNTER: Primary | ICD-10-CM

## 2018-08-17 PROCEDURE — G8978 MOBILITY CURRENT STATUS: HCPCS | Performed by: PHYSICAL THERAPIST

## 2018-08-17 PROCEDURE — 97161 PT EVAL LOW COMPLEX 20 MIN: CPT | Performed by: PHYSICAL THERAPIST

## 2018-08-17 PROCEDURE — G8979 MOBILITY GOAL STATUS: HCPCS | Performed by: PHYSICAL THERAPIST

## 2018-08-17 PROCEDURE — 97140 MANUAL THERAPY 1/> REGIONS: CPT | Performed by: PHYSICAL THERAPIST

## 2018-08-17 NOTE — PROGRESS NOTES
PT Evaluation     Today's date: 2018  Patient name: Jay Carter  : 1955  MRN: [id number]  Referring provider: Marguerite Blackwood PT  Dx: No diagnosis found  Assessment  Impairments: abnormal gait, abnormal or restricted ROM, activity intolerance, impaired physical strength, lacks appropriate home exercise program and pain with function    Assessment details: Jay Carter is a 57 y o  male presenting to outpatient physical therapy at [place] [address] with complaints of L H/S pain following a running injury on 18  He presents with decreased range of motion, decreased strength, limited flexibility, decreased tolerance to activity and decreased functional mobility due to a left hamstring strain  He would benefit from skilled PT services in order to address these deficits and reach maximum level of function  Thank you for the referral!  Barriers to therapy: None  Understanding of Dx/Px/POC: excellent  Goals  ST  Independent with HEP in 2 weeks  2  Increase L H/S flexibility to WNL in 2 weeks     LT  Achieve FOTO score of 80/100 in 4 weeks   2  Able to run without L H/S pain in 4 weeks  3  Strength L H/S = 5/5 in 4 weeks  4  No L H/S tightness in 4 weeks    Plan  Patient would benefit from: skilled PT  Planned modality interventions: cryotherapy, electrical stimulation/Russian stimulation, TENS and thermotherapy: hydrocollator packs  Planned therapy interventions: ADL retraining, balance/weight bearing training, flexibility, functional ROM exercises, home exercise program, joint mobilization, manual therapy, neuromuscular re-education, postural training, strengthening, stretching, therapeutic activities and therapeutic exercise  Frequency: 2x week  Duration in weeks: 4  Treatment plan discussed with: patient        Subjective    Objective     Palpation     Additional Palpation Details  Mod tightness and tenderness mid medial L H/S      Neurological Testing     Sensation     Knee Left Knee   Intact: light touch    Right Knee   Intact: light touch     Active Range of Motion   Left Knee   Normal active range of motion    Strength/Myotome Testing     Left Knee   Prone flexion: 4+  Extension: 5    Right Knee   Prone flexion: 5  Extension: 5    Ambulation     Observational Gait   Gait: within functional limits       Flowsheet Rows      Most Recent Value   PT/OT G-Codes   Current Score  60   Projected Score  80   FOTO information reviewed  Yes   Assessment Type  Evaluation   G code set  Mobility: Walking & Moving Around   Mobility: Walking and Moving Around Current Status ()  CK   Mobility: Walking and Moving Around Goal Status ()  CJ        Daily Treatment Diary     Dx:  No diagnosis found    POC EXPIRES On:  9/16/18  PRECAUTIONS:  None  CO-MORBIDITES:  None  PERSONAL FACTORS:  None    Manual  8/17            Graston L H/S 10'            STM L H/S 5'                                                       Exercise Diary                                                                                                                                                                                                                                                                                      Modalities

## 2018-08-22 ENCOUNTER — OFFICE VISIT (OUTPATIENT)
Dept: PHYSICAL THERAPY | Facility: CLINIC | Age: 63
End: 2018-08-22
Payer: COMMERCIAL

## 2018-08-22 DIAGNOSIS — S76.302D LEFT HAMSTRING INJURY, SUBSEQUENT ENCOUNTER: Primary | ICD-10-CM

## 2018-08-22 PROCEDURE — 97140 MANUAL THERAPY 1/> REGIONS: CPT | Performed by: PHYSICAL THERAPIST

## 2018-08-22 PROCEDURE — 97110 THERAPEUTIC EXERCISES: CPT | Performed by: PHYSICAL THERAPIST

## 2018-08-22 NOTE — PROGRESS NOTES
Daily Note / Discharge     Today's date: 2018  Patient name: Harriet Alejandra  : 1955  MRN: [id number]  Referring provider: Saman Escalona PT  Dx:   Encounter Diagnosis     ICD-10-CM    1  Left hamstring injury, subsequent encounter S76 519D                   Subjective: Pt reports feeling much better  Min L H/S tightness still  Objective: See treatment diary below      Assessment:  Pt is showing much less tightness and pain in L H/S  Progression is excellent and likely ready to return to running next week  Did well with new stretching exercise  Plan:  PT prn  POC EXPIRES On:  18  PRECAUTIONS:  None  CO-MORBIDITES:  None  PERSONAL FACTORS:  None    Manual             Graston L H/S 10' 12'           STM L H/S 5' 5'                                                      Exercise Diary              L LE nerve glides supine 8'                                                                                                                                                                                                                                                                       Modalities                                                       Addendum 18:  Pt has met all goals and has returned to running  Ready for D/C

## 2018-08-24 PROCEDURE — G8980 MOBILITY D/C STATUS: HCPCS | Performed by: PHYSICAL THERAPIST

## 2018-08-24 PROCEDURE — G8979 MOBILITY GOAL STATUS: HCPCS | Performed by: PHYSICAL THERAPIST

## 2018-12-07 DIAGNOSIS — I49.1 PAC (PREMATURE ATRIAL CONTRACTION): Primary | ICD-10-CM

## 2018-12-09 RX ORDER — FLECAINIDE ACETATE 50 MG/1
TABLET ORAL
Qty: 180 TABLET | Refills: 3 | Status: SHIPPED | OUTPATIENT
Start: 2018-12-09 | End: 2019-03-13 | Stop reason: SDUPTHER

## 2019-03-13 DIAGNOSIS — I49.1 PAC (PREMATURE ATRIAL CONTRACTION): ICD-10-CM

## 2019-03-13 RX ORDER — FLECAINIDE ACETATE 50 MG/1
50 TABLET ORAL 2 TIMES DAILY
Qty: 180 TABLET | Refills: 1 | Status: SHIPPED | OUTPATIENT
Start: 2019-03-13 | End: 2020-02-26

## 2019-03-14 ENCOUNTER — TELEPHONE (OUTPATIENT)
Dept: CARDIOLOGY CLINIC | Facility: CLINIC | Age: 64
End: 2019-03-14

## 2019-03-14 NOTE — TELEPHONE ENCOUNTER
Pt called, script for flecainide was sent in yesterday for twice daily, he is currently on once daily has Dr Lexi Fragoso cut him down a couple months ago  Called [facility], [facility] to correct the script to daily and to call our office with any questions

## 2019-06-05 ENCOUNTER — OFFICE VISIT (OUTPATIENT)
Dept: URGENT CARE | Facility: CLINIC | Age: 64
End: 2019-06-05
Payer: COMMERCIAL

## 2019-06-05 VITALS
BODY MASS INDEX: 27.11 KG/M2 | OXYGEN SATURATION: 96 % | HEIGHT: 69 IN | HEART RATE: 65 BPM | RESPIRATION RATE: 18 BRPM | SYSTOLIC BLOOD PRESSURE: 122 MMHG | TEMPERATURE: 98.5 F | DIASTOLIC BLOOD PRESSURE: 82 MMHG | WEIGHT: 183 LBS

## 2019-06-05 DIAGNOSIS — J40 BRONCHITIS: Primary | ICD-10-CM

## 2019-06-05 PROCEDURE — 99213 OFFICE O/P EST LOW 20 MIN: CPT | Performed by: PHYSICIAN ASSISTANT

## 2019-06-05 RX ORDER — AZITHROMYCIN 250 MG/1
TABLET, FILM COATED ORAL
Refills: 1 | COMMUNITY
Start: 2019-06-02 | End: 2020-01-31 | Stop reason: ALTCHOICE

## 2019-06-05 RX ORDER — ALBUTEROL SULFATE 90 UG/1
2 AEROSOL, METERED RESPIRATORY (INHALATION) EVERY 6 HOURS PRN
Qty: 8.5 G | Refills: 0 | Status: SHIPPED | OUTPATIENT
Start: 2019-06-05 | End: 2020-01-31 | Stop reason: ALTCHOICE

## 2019-06-05 RX ORDER — PREDNISONE 10 MG/1
TABLET ORAL
Qty: 21 TABLET | Refills: 0 | Status: SHIPPED | OUTPATIENT
Start: 2019-06-05 | End: 2020-01-31 | Stop reason: ALTCHOICE

## 2019-06-05 RX ORDER — AMOXICILLIN AND CLAVULANATE POTASSIUM 875; 125 MG/1; MG/1
1 TABLET, FILM COATED ORAL EVERY 12 HOURS SCHEDULED
Qty: 14 TABLET | Refills: 0 | Status: SHIPPED | OUTPATIENT
Start: 2019-06-05 | End: 2019-06-07

## 2019-06-07 ENCOUNTER — OFFICE VISIT (OUTPATIENT)
Dept: URGENT CARE | Facility: CLINIC | Age: 64
End: 2019-06-07
Payer: COMMERCIAL

## 2019-06-07 VITALS
RESPIRATION RATE: 18 BRPM | WEIGHT: 183 LBS | HEART RATE: 58 BPM | SYSTOLIC BLOOD PRESSURE: 122 MMHG | DIASTOLIC BLOOD PRESSURE: 84 MMHG | OXYGEN SATURATION: 98 % | TEMPERATURE: 97.3 F | BODY MASS INDEX: 27.02 KG/M2

## 2019-06-07 DIAGNOSIS — B96.89 ACUTE BACTERIAL BRONCHITIS: Primary | ICD-10-CM

## 2019-06-07 DIAGNOSIS — J20.8 ACUTE BACTERIAL BRONCHITIS: Primary | ICD-10-CM

## 2019-06-07 PROCEDURE — 99213 OFFICE O/P EST LOW 20 MIN: CPT | Performed by: NURSE PRACTITIONER

## 2019-06-07 RX ORDER — CLINDAMYCIN HYDROCHLORIDE 300 MG/1
300 CAPSULE ORAL 3 TIMES DAILY
Qty: 21 CAPSULE | Refills: 0 | Status: SHIPPED | OUTPATIENT
Start: 2019-06-07 | End: 2019-06-14

## 2019-06-07 RX ORDER — BENZONATATE 200 MG/1
200 CAPSULE ORAL 3 TIMES DAILY PRN
Qty: 20 CAPSULE | Refills: 0 | Status: SHIPPED | OUTPATIENT
Start: 2019-06-07 | End: 2020-01-31 | Stop reason: ALTCHOICE

## 2019-08-01 ENCOUNTER — APPOINTMENT (OUTPATIENT)
Dept: LAB | Facility: HOSPITAL | Age: 64
End: 2019-08-01
Payer: COMMERCIAL

## 2019-08-01 ENCOUNTER — TRANSCRIBE ORDERS (OUTPATIENT)
Dept: LAB | Facility: HOSPITAL | Age: 64
End: 2019-08-01

## 2019-08-01 DIAGNOSIS — E78.00 PURE HYPERCHOLESTEROLEMIA: ICD-10-CM

## 2019-08-01 DIAGNOSIS — R19.7 DIARRHEA, UNSPECIFIED TYPE: ICD-10-CM

## 2019-08-01 DIAGNOSIS — R73.01 IMPAIRED FASTING GLUCOSE: ICD-10-CM

## 2019-08-01 DIAGNOSIS — B96.81 GASTRIC ULCER DUE TO HELICOBACTER PYLORI, UNSPECIFIED CHRONICITY: ICD-10-CM

## 2019-08-01 DIAGNOSIS — Z85.46 PERSONAL HISTORY OF MALIGNANT NEOPLASM OF PROSTATE: ICD-10-CM

## 2019-08-01 DIAGNOSIS — R73.01 IMPAIRED FASTING GLUCOSE: Primary | ICD-10-CM

## 2019-08-01 DIAGNOSIS — K25.9 GASTRIC ULCER DUE TO HELICOBACTER PYLORI, UNSPECIFIED CHRONICITY: ICD-10-CM

## 2019-08-01 DIAGNOSIS — Z12.11 ENCOUNTER FOR SCREENING FOR MALIGNANT NEOPLASM OF COLON: ICD-10-CM

## 2019-08-01 DIAGNOSIS — Z79.899 ENCOUNTER FOR LONG-TERM (CURRENT) USE OF OTHER MEDICATIONS: ICD-10-CM

## 2019-08-01 LAB
ALBUMIN SERPL BCP-MCNC: 4 G/DL (ref 3.5–5)
ALP SERPL-CCNC: 53 U/L (ref 46–116)
ALT SERPL W P-5'-P-CCNC: 32 U/L (ref 12–78)
ANION GAP SERPL CALCULATED.3IONS-SCNC: 5 MMOL/L (ref 4–13)
AST SERPL W P-5'-P-CCNC: 15 U/L (ref 5–45)
BACTERIA UR QL AUTO: ABNORMAL /HPF
BASOPHILS # BLD AUTO: 0.04 THOUSANDS/ΜL (ref 0–0.1)
BASOPHILS NFR BLD AUTO: 1 % (ref 0–1)
BILIRUB SERPL-MCNC: 0.74 MG/DL (ref 0.2–1)
BILIRUB UR QL STRIP: NEGATIVE
BUN SERPL-MCNC: 15 MG/DL (ref 5–25)
CALCIUM SERPL-MCNC: 8.8 MG/DL (ref 8.3–10.1)
CHLORIDE SERPL-SCNC: 102 MMOL/L (ref 100–108)
CHOLEST SERPL-MCNC: 173 MG/DL (ref 50–200)
CK SERPL-CCNC: 66 U/L (ref 39–308)
CLARITY UR: CLEAR
CO2 SERPL-SCNC: 27 MMOL/L (ref 21–32)
COLOR UR: YELLOW
CREAT SERPL-MCNC: 1.12 MG/DL (ref 0.6–1.3)
CREAT UR-MCNC: 13.6 MG/DL
EOSINOPHIL # BLD AUTO: 0.08 THOUSAND/ΜL (ref 0–0.61)
EOSINOPHIL NFR BLD AUTO: 1 % (ref 0–6)
ERYTHROCYTE [DISTWIDTH] IN BLOOD BY AUTOMATED COUNT: 12.1 % (ref 11.6–15.1)
EST. AVERAGE GLUCOSE BLD GHB EST-MCNC: 114 MG/DL
GFR SERPL CREATININE-BSD FRML MDRD: 69 ML/MIN/1.73SQ M
GLUCOSE P FAST SERPL-MCNC: 102 MG/DL (ref 65–99)
GLUCOSE UR STRIP-MCNC: NEGATIVE MG/DL
HBA1C MFR BLD: 5.6 % (ref 4.2–6.3)
HCT VFR BLD AUTO: 43.6 % (ref 36.5–49.3)
HDLC SERPL-MCNC: 78 MG/DL (ref 40–60)
HGB BLD-MCNC: 14.4 G/DL (ref 12–17)
HGB UR QL STRIP.AUTO: NEGATIVE
IMM GRANULOCYTES # BLD AUTO: 0.04 THOUSAND/UL (ref 0–0.2)
IMM GRANULOCYTES NFR BLD AUTO: 1 % (ref 0–2)
KETONES UR STRIP-MCNC: NEGATIVE MG/DL
LDLC SERPL CALC-MCNC: 72 MG/DL (ref 0–100)
LEUKOCYTE ESTERASE UR QL STRIP: NEGATIVE
LYMPHOCYTES # BLD AUTO: 1.45 THOUSANDS/ΜL (ref 0.6–4.47)
LYMPHOCYTES NFR BLD AUTO: 25 % (ref 14–44)
MCH RBC QN AUTO: 29.6 PG (ref 26.8–34.3)
MCHC RBC AUTO-ENTMCNC: 33 G/DL (ref 31.4–37.4)
MCV RBC AUTO: 90 FL (ref 82–98)
MICROALBUMIN UR-MCNC: <5 MG/L (ref 0–20)
MICROALBUMIN/CREAT 24H UR: <37 MG/G CREATININE (ref 0–30)
MONOCYTES # BLD AUTO: 0.59 THOUSAND/ΜL (ref 0.17–1.22)
MONOCYTES NFR BLD AUTO: 10 % (ref 4–12)
NEUTROPHILS # BLD AUTO: 3.63 THOUSANDS/ΜL (ref 1.85–7.62)
NEUTS SEG NFR BLD AUTO: 62 % (ref 43–75)
NITRITE UR QL STRIP: NEGATIVE
NON-SQ EPI CELLS URNS QL MICRO: ABNORMAL /HPF
NONHDLC SERPL-MCNC: 95 MG/DL
NRBC BLD AUTO-RTO: 0 /100 WBCS
PH UR STRIP.AUTO: 7 [PH]
PLATELET # BLD AUTO: 246 THOUSANDS/UL (ref 149–390)
PMV BLD AUTO: 9.2 FL (ref 8.9–12.7)
POTASSIUM SERPL-SCNC: 3.6 MMOL/L (ref 3.5–5.3)
PROT SERPL-MCNC: 7.4 G/DL (ref 6.4–8.2)
PROT UR STRIP-MCNC: NEGATIVE MG/DL
PSA SERPL-MCNC: <0.1 NG/ML (ref 0–4)
RBC # BLD AUTO: 4.87 MILLION/UL (ref 3.88–5.62)
RBC #/AREA URNS AUTO: ABNORMAL /HPF
SODIUM SERPL-SCNC: 134 MMOL/L (ref 136–145)
SP GR UR STRIP.AUTO: 1 (ref 1–1.03)
TRIGL SERPL-MCNC: 116 MG/DL
UROBILINOGEN UR QL STRIP.AUTO: 0.2 E.U./DL
WBC # BLD AUTO: 5.83 THOUSAND/UL (ref 4.31–10.16)
WBC #/AREA URNS AUTO: ABNORMAL /HPF

## 2019-08-01 PROCEDURE — 82043 UR ALBUMIN QUANTITATIVE: CPT

## 2019-08-01 PROCEDURE — 82570 ASSAY OF URINE CREATININE: CPT

## 2019-08-01 PROCEDURE — 81001 URINALYSIS AUTO W/SCOPE: CPT

## 2019-08-01 PROCEDURE — 82550 ASSAY OF CK (CPK): CPT

## 2019-08-01 PROCEDURE — 36415 COLL VENOUS BLD VENIPUNCTURE: CPT

## 2019-08-01 PROCEDURE — 80053 COMPREHEN METABOLIC PANEL: CPT

## 2019-08-01 PROCEDURE — 85025 COMPLETE CBC W/AUTO DIFF WBC: CPT

## 2019-08-01 PROCEDURE — 80061 LIPID PANEL: CPT

## 2019-08-01 PROCEDURE — 83036 HEMOGLOBIN GLYCOSYLATED A1C: CPT

## 2019-08-01 PROCEDURE — 84153 ASSAY OF PSA TOTAL: CPT

## 2019-08-14 ENCOUNTER — OFFICE VISIT (OUTPATIENT)
Dept: PHYSICAL THERAPY | Facility: CLINIC | Age: 64
End: 2019-08-14
Payer: COMMERCIAL

## 2019-08-14 DIAGNOSIS — S39.012D LUMBAR STRAIN, SUBSEQUENT ENCOUNTER: Primary | ICD-10-CM

## 2019-08-14 PROCEDURE — 97161 PT EVAL LOW COMPLEX 20 MIN: CPT | Performed by: PHYSICAL THERAPIST

## 2019-08-14 NOTE — PROGRESS NOTES
PT Evaluation     Today's date: 2019  Patient name: Kisha Dwyer  : 1955  MRN: [id number]  Referring provider: Dejah Rivas, PT  Dx:   Encounter Diagnosis     ICD-10-CM    1  Lumbar strain, subsequent encounter S39 012D                   Assessment  Assessment details: Kisha Dwyer is a 70 y o  male presenting to outpatient physical therapy at [place] [address] with complaints of LBP  He presents with decreased lumbar range of motion, decreased core strength, limited flexibility, poor postural awareness, decreased tolerance to activity and decreased functional mobility due to Lumbar strain, subsequent encounter  (primary encounter diagnosis)  He would benefit from skilled PT services in order to address these deficits and reach maximum level of function  Impairments: abnormal gait, abnormal or restricted ROM, activity intolerance, impaired physical strength, lacks appropriate home exercise program, pain with function and poor posture   Barriers to therapy: None  Understanding of Dx/Px/POC: excellent  Goals  ST  Independent with HEP in 2 weeks  2  Increase AROM L-spine to WNL all motions in 3 weeks     LT  Achieve FOTO score of 77/100 in 4 weeks   2  Able to walk upright without LBP x 1 hr and ride bike x 1 hr without LBP in 4 weeks  3  Strength abdominals = 5/5 in 4 weeks  4    No hip flexor tightness in 4 weeks    Plan  Patient would benefit from: skilled PT  Planned modality interventions: TENS and thermotherapy: hydrocollator packs  Planned therapy interventions: abdominal trunk stabilization, ADL retraining, body mechanics training, flexibility, functional ROM exercises, home exercise program, joint mobilization, manual therapy, neuromuscular re-education, postural training, strengthening, stretching, therapeutic activities and therapeutic exercise  Frequency: 2x week  Duration in weeks: 4  Plan of Care beginning date: 2019  Plan of Care expiration date: 2019  Treatment plan discussed with: patient        Subjective Evaluation    History of Present Illness  Mechanism of injury: Pt reports riding his bike a few days ago and when he got off and stood up, he could not straighten up fully due to LBP  Bike ride was 12 miles, which is normal for him  Working full duty as a physician, 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  Not a recurrent problem   Quality of life: excellent    Pain  Current pain rating: 3  At best pain ratin  At worst pain ratin  Quality: dull ache, tight and sharp  Progression: no change    Social Support  Steps to enter house: yes  Stairs in house: yes   Lives in: multiple-level home  Lives with: spouse    Employment status: working    Diagnostic Tests  No diagnostic tests performed  Treatments  No previous or current treatments  Patient Goals  Patient goals for therapy: decreased pain, increased motion, increased strength, independence with ADLs/IADLs and return to sport/leisure activities          Objective     Concurrent Complaints  Negative for night pain and disturbed sleep    Postural Observations  Seated posture: fair  Standing posture: fair  Correction of posture: makes symptoms worse        Palpation   Left   No palpable tenderness to the erector spinae  Hypertonic in the iliopsoas  Right   No palpable tenderness to the erector spinae  Hypertonic in the iliopsoas  Additional Palpation Details  Mod tightness B hip flexors  Tenderness     Lumbar Spine  No tenderness in the spinous process and facet joint       Neurological Testing     Sensation     Lumbar   Left   Intact: light touch    Right   Intact: light touch    Active Range of Motion     Lumbar   Flexion:  WFL  Extension:  with pain Restriction level: moderate  Left lateral flexion:  WFL  Right lateral flexion:  WFL  Left rotation:  Restriction level: minimal  Right rotation:  Restriction level: minimal    Strength/Myotome Testing     Lumbar   Left   Normal strength    Right Normal strength    Additional Strength Details  Abdominals = 4/5  Tests     Lumbar     Left   Negative passive SLR and slump test      Right   Negative passive SLR and slump test      Ambulation     Comments   Slight flexed posture at trunk  Graphical documentation             Daily Treatment Diary     Dx:    1   Lumbar strain, subsequent encounter      POC EXPIRES On:  9/13/19  PRECAUTIONS:  None  CO-MORBIDITES:  None  PERSONAL FACTORS:  None    Manual              Braeden test stretching B LEs NV            STM B PSIS NV                                                       Exercise Diary  8/14            PPT supine 5" 10            Crossed leg stretch L/R 10" 5 ea            Kneeling hip flexor stretch L/R 20" 2 ea                                                                                                                                                                                                                                             Modalities

## 2019-08-19 ENCOUNTER — OFFICE VISIT (OUTPATIENT)
Dept: PHYSICAL THERAPY | Facility: CLINIC | Age: 64
End: 2019-08-19
Payer: COMMERCIAL

## 2019-08-19 DIAGNOSIS — S39.012D LUMBAR STRAIN, SUBSEQUENT ENCOUNTER: Primary | ICD-10-CM

## 2019-08-19 PROCEDURE — 97110 THERAPEUTIC EXERCISES: CPT | Performed by: PHYSICAL THERAPIST

## 2019-08-19 PROCEDURE — 97140 MANUAL THERAPY 1/> REGIONS: CPT | Performed by: PHYSICAL THERAPIST

## 2019-08-19 NOTE — PROGRESS NOTES
Daily Note / Discharge    Today's date: 2019  Patient name: Natalya Means  : 1955  MRN: [id number]  Referring provider: Johnnie Runner, PT  Dx:   Encounter Diagnosis     ICD-10-CM    1  Lumbar strain, subsequent encounter S39 012D         Addendum 9/10/19:  Pt continuing with HEP  Will call if more PT is needed  Subjective:  Pt reports feeling much less LBP since his IE   R knee is a little sore though after running 9 miles yesterday  Objective: See treatment diary below      Assessment:  Pt presented to outpatient physical therapy at [place] [address] with complaints of LBP  He presented with decreased lumbar range of motion, decreased core strength, limited flexibility, poor postural awareness, decreased tolerance to activity and decreased functional mobility due to Lumbar strain, subsequent encounter  (primary encounter diagnosis)  He will continue to benefit from skilled PT services in order to address these deficits and reach maximum level of function  Min tightness L>R lumbosacral region today that was eliminated with STM  Min L hip flexor tightness still > R, but no significant H/S tightness  Did well with dead bugs  Plan:  PT prn with focus on manual tx and advanced core strengthening  Daily Treatment Diary     Dx:    1   Lumbar strain, subsequent encounter      POC EXPIRES On:  19  PRECAUTIONS:  None  CO-MORBIDITES:  None  PERSONAL FACTORS:  None    Manual              Braeden test stretching B LEs 5'            STM B PSIS 5'            H/S stretch L/R 5'                                          Exercise Diary             PPT supine 5" 10 5" 10           Crossed leg stretch L/R 10" 5 ea HEP           Kneeling hip flexor stretch L/R 20" 2 ea 20" 2 ea           Dead bugs LE's only at the same time  10 ea Modalities

## 2019-08-28 ENCOUNTER — APPOINTMENT (OUTPATIENT)
Dept: LAB | Facility: CLINIC | Age: 64
End: 2019-08-28
Payer: COMMERCIAL

## 2019-08-28 DIAGNOSIS — R19.7 DIARRHEA, UNSPECIFIED TYPE: ICD-10-CM

## 2019-08-28 LAB
ANION GAP SERPL CALCULATED.3IONS-SCNC: 6 MMOL/L (ref 4–13)
BASOPHILS # BLD AUTO: 0.03 THOUSANDS/ΜL (ref 0–0.1)
BASOPHILS NFR BLD AUTO: 0 % (ref 0–1)
BUN SERPL-MCNC: 11 MG/DL (ref 5–25)
CALCIUM SERPL-MCNC: 8.8 MG/DL (ref 8.3–10.1)
CHLORIDE SERPL-SCNC: 102 MMOL/L (ref 100–108)
CO2 SERPL-SCNC: 29 MMOL/L (ref 21–32)
CREAT SERPL-MCNC: 1.17 MG/DL (ref 0.6–1.3)
CRP SERPL QL: 23.7 MG/L
EOSINOPHIL # BLD AUTO: 0.05 THOUSAND/ΜL (ref 0–0.61)
EOSINOPHIL NFR BLD AUTO: 1 % (ref 0–6)
ERYTHROCYTE [DISTWIDTH] IN BLOOD BY AUTOMATED COUNT: 12.2 % (ref 11.6–15.1)
ERYTHROCYTE [SEDIMENTATION RATE] IN BLOOD: 20 MM/HOUR (ref 0–10)
GFR SERPL CREATININE-BSD FRML MDRD: 65 ML/MIN/1.73SQ M
GLUCOSE SERPL-MCNC: 110 MG/DL (ref 65–140)
HCT VFR BLD AUTO: 42.1 % (ref 36.5–49.3)
HGB BLD-MCNC: 13.6 G/DL (ref 12–17)
IMM GRANULOCYTES # BLD AUTO: 0.08 THOUSAND/UL (ref 0–0.2)
IMM GRANULOCYTES NFR BLD AUTO: 1 % (ref 0–2)
LYMPHOCYTES # BLD AUTO: 0.98 THOUSANDS/ΜL (ref 0.6–4.47)
LYMPHOCYTES NFR BLD AUTO: 12 % (ref 14–44)
MCH RBC QN AUTO: 29.6 PG (ref 26.8–34.3)
MCHC RBC AUTO-ENTMCNC: 32.3 G/DL (ref 31.4–37.4)
MCV RBC AUTO: 92 FL (ref 82–98)
MONOCYTES # BLD AUTO: 0.87 THOUSAND/ΜL (ref 0.17–1.22)
MONOCYTES NFR BLD AUTO: 11 % (ref 4–12)
NEUTROPHILS # BLD AUTO: 6.1 THOUSANDS/ΜL (ref 1.85–7.62)
NEUTS SEG NFR BLD AUTO: 75 % (ref 43–75)
NRBC BLD AUTO-RTO: 0 /100 WBCS
PLATELET # BLD AUTO: 332 THOUSANDS/UL (ref 149–390)
PMV BLD AUTO: 8.8 FL (ref 8.9–12.7)
POTASSIUM SERPL-SCNC: 3.9 MMOL/L (ref 3.5–5.3)
RBC # BLD AUTO: 4.59 MILLION/UL (ref 3.88–5.62)
SODIUM SERPL-SCNC: 137 MMOL/L (ref 136–145)
WBC # BLD AUTO: 8.11 THOUSAND/UL (ref 4.31–10.16)

## 2019-08-28 PROCEDURE — 80048 BASIC METABOLIC PNL TOTAL CA: CPT

## 2019-08-28 PROCEDURE — 85652 RBC SED RATE AUTOMATED: CPT

## 2019-08-28 PROCEDURE — 85025 COMPLETE CBC W/AUTO DIFF WBC: CPT

## 2019-08-28 PROCEDURE — 36415 COLL VENOUS BLD VENIPUNCTURE: CPT

## 2019-08-28 PROCEDURE — 86140 C-REACTIVE PROTEIN: CPT

## 2019-08-30 ENCOUNTER — TRANSCRIBE ORDERS (OUTPATIENT)
Dept: LAB | Facility: HOSPITAL | Age: 64
End: 2019-08-30

## 2019-08-30 ENCOUNTER — APPOINTMENT (OUTPATIENT)
Dept: LAB | Facility: CLINIC | Age: 64
End: 2019-08-30
Payer: COMMERCIAL

## 2019-08-30 ENCOUNTER — APPOINTMENT (OUTPATIENT)
Dept: LAB | Facility: HOSPITAL | Age: 64
End: 2019-08-30
Payer: COMMERCIAL

## 2019-08-30 DIAGNOSIS — B96.81 GASTRIC ULCER DUE TO HELICOBACTER PYLORI, UNSPECIFIED CHRONICITY: ICD-10-CM

## 2019-08-30 DIAGNOSIS — K25.9 GASTRIC ULCER DUE TO HELICOBACTER PYLORI, UNSPECIFIED CHRONICITY: ICD-10-CM

## 2019-08-30 DIAGNOSIS — Z12.11 ENCOUNTER FOR SCREENING FOR MALIGNANT NEOPLASM OF COLON: ICD-10-CM

## 2019-08-30 DIAGNOSIS — R19.7 DIARRHEA OF PRESUMED INFECTIOUS ORIGIN: Primary | ICD-10-CM

## 2019-08-30 DIAGNOSIS — R19.7 DIARRHEA OF PRESUMED INFECTIOUS ORIGIN: ICD-10-CM

## 2019-08-30 LAB — HEMOCCULT STL QL IA: NEGATIVE

## 2019-08-30 PROCEDURE — 87338 HPYLORI STOOL AG IA: CPT

## 2019-08-30 PROCEDURE — 87505 NFCT AGENT DETECTION GI: CPT

## 2019-08-30 PROCEDURE — 89055 LEUKOCYTE ASSESSMENT FECAL: CPT

## 2019-08-30 PROCEDURE — 87798 DETECT AGENT NOS DNA AMP: CPT

## 2019-08-30 PROCEDURE — G0328 FECAL BLOOD SCRN IMMUNOASSAY: HCPCS

## 2019-08-31 LAB
C DIFF TOX GENS STL QL NAA+PROBE: NORMAL
CAMPYLOBACTER DNA SPEC NAA+PROBE: DETECTED
SALMONELLA DNA SPEC QL NAA+PROBE: ABNORMAL
SHIGA TOXIN STX GENE SPEC NAA+PROBE: ABNORMAL
SHIGELLA DNA SPEC QL NAA+PROBE: ABNORMAL

## 2019-09-01 LAB — H PYLORI AG STL QL IA: NEGATIVE

## 2019-09-02 LAB
G LAMBLIA AG STL QL IA: NEGATIVE
WBC SPEC QL GRAM STN: NORMAL

## 2019-09-05 LAB — MISCELLANEOUS LAB TEST RESULT: NORMAL

## 2019-09-13 DIAGNOSIS — I49.1 PAC (PREMATURE ATRIAL CONTRACTION): ICD-10-CM

## 2019-09-16 RX ORDER — FLECAINIDE ACETATE 50 MG/1
TABLET ORAL
Qty: 90 TABLET | Refills: 2 | OUTPATIENT
Start: 2019-09-16

## 2019-10-03 DIAGNOSIS — J40 BRONCHITIS: ICD-10-CM

## 2019-10-03 RX ORDER — ALBUTEROL SULFATE 90 UG/1
AEROSOL, METERED RESPIRATORY (INHALATION)
Qty: 8.5 INHALER | Refills: 0 | OUTPATIENT
Start: 2019-10-03

## 2019-11-22 ENCOUNTER — OFFICE VISIT (OUTPATIENT)
Dept: CARDIOLOGY CLINIC | Facility: CLINIC | Age: 64
End: 2019-11-22
Payer: COMMERCIAL

## 2019-11-22 VITALS
DIASTOLIC BLOOD PRESSURE: 68 MMHG | HEART RATE: 56 BPM | SYSTOLIC BLOOD PRESSURE: 104 MMHG | HEIGHT: 69 IN | WEIGHT: 176 LBS | BODY MASS INDEX: 26.07 KG/M2

## 2019-11-22 DIAGNOSIS — I49.1 PAC (PREMATURE ATRIAL CONTRACTION): Primary | ICD-10-CM

## 2019-11-22 PROCEDURE — 93000 ELECTROCARDIOGRAM COMPLETE: CPT | Performed by: INTERNAL MEDICINE

## 2019-11-22 PROCEDURE — 99212 OFFICE O/P EST SF 10 MIN: CPT | Performed by: INTERNAL MEDICINE

## 2019-11-22 NOTE — PROGRESS NOTES
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 he has premature atrial contractions and a structurally normal heart  He is on flecainide 50 mg once daily and has absolutely no complaints he runs 15 miles a week he runs a half marathon almost every year  ROS:   12 point ROS negative for complaints       Objective:     Vitals: Blood pressure 104/68, pulse 56, height 5' 9" (1 753 m), weight 79 8 kg (176 lb)  , Body mass index is 25 99 kg/m²  ,        Physical Exam:    GEN: Enoch Ingram appears well, alert and oriented x 3, pleasant and cooperative   HEENT: pupils equal, round, and reactive to light; extraocular muscles intact  NECK: supple, no carotid bruits   HEART: regular rhythm, normal S1 and S2, no murmurs, clicks, gallops or rubs   LUNGS: clear to auscultation bilaterally; no wheezes, rales, or rhonchi   ABDOMEN: normal bowel sounds, soft, no tenderness, no distention  EXTREMITIES: peripheral pulses normal; no clubbing, cyanosis, or edema  NEURO: no focal findings   SKIN: normal without suspicious lesions on exposed skin    Medications:      Current Outpatient Medications:     Ascorbic Acid (VITAMIN C) 1000 MG tablet, Take 1 capsule by mouth daily, Disp: , Rfl:     aspirin 81 mg chewable tablet, Chew, Disp: , Rfl:     flecainide (TAMBOCOR) 50 mg tablet, Take 1 tablet (50 mg total) by mouth 2 (two) times a day (Patient taking differently: Take 50 mg by mouth daily ), Disp: 180 tablet, Rfl: 1    Omega-3 Fatty Acids (FISH OIL) 1200 MG CAPS, Take by mouth daily  , Disp: , Rfl:     rosuvastatin (CRESTOR) 5 mg tablet, Take by mouth, Disp: , Rfl:     albuterol (PROAIR HFA) 90 mcg/act inhaler, Inhale 2 puffs every 6 (six) hours as needed for wheezing (Patient not taking: Reported on 11/22/2019), Disp: 8 5 g, Rfl: 0    azithromycin (ZITHROMAX) 250 mg tablet, TAKE 2 TABLETS BY MOUTH TODAY, THEN TAKE 1 TABLET DAILY FOR 4 DAYS, Disp: , Rfl: 1    benzonatate (TESSALON) 200 MG capsule, Take 1 capsule (200 mg total) by mouth 3 (three) times a day as needed for cough (Patient not taking: Reported on 11/22/2019), Disp: 20 capsule, Rfl: 0    predniSONE 10 mg tablet, Take 6 tablets today, 5 tablets tomorrow, 4 the next day, 3 the next day, 2 the following and 1 the last day- all with food (Patient not taking: Reported on 11/22/2019), Disp: 21 tablet, Rfl: 0    zolpidem (AMBIEN) 5 mg tablet, Take 1 tablet (5 mg total) by mouth daily at bedtime as needed for sleep for up to 10 doses (Patient not taking: Reported on 6/5/2019), Disp: 30 tablet, Rfl: 0     No family history on file    Social History     Socioeconomic History    Marital status: /Civil Union     Spouse name: Not on file    Number of children: Not on file    Years of education: Not on file    Highest education level: Not on file   Occupational History    Not on file   Social Needs    Financial resource strain: Not on file    Food insecurity:     Worry: Not on file     Inability: Not on file    Transportation needs:     Medical: Not on file     Non-medical: Not on file   Tobacco Use    Smoking status: Never Smoker    Smokeless tobacco: Never Used   Substance and Sexual Activity    Alcohol use: Not on file    Drug use: Not on file    Sexual activity: Not on file   Lifestyle    Physical activity:     Days per week: Not on file     Minutes per session: Not on file    Stress: Not on file   Relationships    Social connections:     Talks on phone: Not on file     Gets together: Not on file     Attends [employer] service: Not on file     Active member of club or organization: Not on file     Attends meetings of clubs or organizations: Not on file     Relationship status: Not on file    Intimate partner violence:     Fear of current or ex partner: Not on file     Emotionally abused: Not on file     Physically abused: Not on file     Forced sexual activity: Not on file   Other Topics Concern    Not on file   Social History Narrative    Not on file     Social History     Tobacco Use   Smoking Status Never Smoker   Smokeless Tobacco Never Used     Social History     Substance and Sexual Activity   Alcohol Use Not on file       Labs & Results:  Below is the patient's most recent value for Albumin, ALT, AST, BUN, Calcium, Chloride, Cholesterol, CO2, Creatinine, GFR, Glucose, HDL, Hematocrit, Hemoglobin, Hemoglobin A1C, LDL, Magnesium, Phosphorus, Platelets, Potassium, PSA, Sodium, Triglycerides, and WBC  Lab Results   Component Value Date    ALT 32 08/01/2019    AST 15 08/01/2019    BUN 11 08/28/2019    CALCIUM 8 8 08/28/2019     08/28/2019    CHOL 199 06/11/2015    CO2 29 08/28/2019    CREATININE 1 17 08/28/2019    HDL 78 (H) 08/01/2019    HCT 42 1 08/28/2019    HGB 13 6 08/28/2019    HGBA1C 5 6 08/01/2019     08/28/2019    K 3 9 08/28/2019    PSA <0 1 08/01/2019     06/11/2015    TRIG 116 08/01/2019    WBC 8 11 08/28/2019     Note: for a comprehensive list of the patient's lab results, access the Results Review activity  Cardiac testing:   No results found for this or any previous visit  No results found for this or any previous visit  No results found for this or any previous visit  No results found for this or any previous visit

## 2019-11-27 ENCOUNTER — TELEPHONE (OUTPATIENT)
Dept: VASCULAR SURGERY | Facility: CLINIC | Age: 64
End: 2019-11-27

## 2019-12-26 ENCOUNTER — OFFICE VISIT (OUTPATIENT)
Dept: PHYSICAL THERAPY | Facility: CLINIC | Age: 64
End: 2019-12-26
Payer: COMMERCIAL

## 2019-12-26 DIAGNOSIS — M51.36 DDD (DEGENERATIVE DISC DISEASE), LUMBAR: Primary | ICD-10-CM

## 2019-12-26 PROCEDURE — 97161 PT EVAL LOW COMPLEX 20 MIN: CPT | Performed by: PHYSICAL THERAPIST

## 2019-12-26 PROCEDURE — 97140 MANUAL THERAPY 1/> REGIONS: CPT | Performed by: PHYSICAL THERAPIST

## 2019-12-26 NOTE — PROGRESS NOTES
PT Evaluation     Today's date: 2019  Patient name: Renata Lipscomb  : 1955  MRN: [id number]  Referring provider: Komal Azul, PT  Dx:   Encounter Diagnosis     ICD-10-CM    1  DDD (degenerative disc disease), lumbar M51 36                   Assessment  Assessment details: Renata Lipscomb is a 75 y o  male presenting to outpatient physical therapy at [place] [address] with complaints of LBP  He presents with decreased lumbar range of motion, decreased core strength, limited flexibility, decreased tolerance to activity and decreased functional mobility due to Lumbar DDD  He would benefit from skilled PT services in order to address these deficits and reach maximum level of function  Impairments: abnormal or restricted ROM, activity intolerance, impaired physical strength, lacks appropriate home exercise program and pain with function  Barriers to therapy: None  Understanding of Dx/Px/POC: excellent  Goals  ST  Independent with HEP in 2 weeks  2  Increase AROM L-spine to WNL all motions in 3 weeks     LT  Achieve FOTO score of 79/100 in 4 weeks   2  Able to stand without LBP x 30 min in 4 weeks  3  Strength abdominals = 5/5 in 4 weeks  4    No lumbar tightness or tenderness in 4 weeks    Plan  Patient would benefit from: skilled PT  Planned modality interventions: TENS and thermotherapy: hydrocollator packs  Planned therapy interventions: abdominal trunk stabilization, ADL retraining, body mechanics training, flexibility, functional ROM exercises, home exercise program, joint mobilization, manual therapy, neuromuscular re-education, postural training, strengthening, stretching, therapeutic activities and therapeutic exercise  Frequency: 2x week  Duration in weeks: 4  Plan of Care beginning date: 2019  Plan of Care expiration date: 2020  Treatment plan discussed with: patient        Subjective Evaluation    History of Present Illness  Mechanism of injury: Pt reports having B upper buttock pain > LBP during the past 3 weeks mostly with standing  Has been able to run and sit without pain aside from the first 1 minute running  Working full duty as a physician  Not a recurrent problem   Quality of life: excellent    Pain  Current pain rating: 3  At best pain ratin  At worst pain ratin  Quality: dull ache, tight and sharp  Progression: worsening    Social Support  Steps to enter house: yes  Stairs in house: yes   Lives in: multiple-level home  Lives with: spouse    Employment status: working    Diagnostic Tests  No diagnostic tests performed  Treatments  Previous treatment: physical therapy  Patient Goals  Patient goals for therapy: decreased pain, increased motion, increased strength, independence with ADLs/IADLs and return to sport/leisure activities          Objective     Concurrent Complaints  Negative for night pain and disturbed sleep    Postural Observations  Seated posture: fair  Standing posture: fair  Correction of posture: makes symptoms worse        Palpation   Left   No palpable tenderness to the erector spinae and iliopsoas  Right   No palpable tenderness to the erector spinae and iliopsoas  Additional Palpation Details  Min tightness B hip flexors  Tenderness     Lumbar Spine  No tenderness in the spinous process and facet joint  Additional Tenderness Details  Mod tenderness L > R sacral base and PSIS  Neurological Testing     Sensation     Lumbar   Left   Intact: light touch    Right   Intact: light touch    Active Range of Motion     Lumbar   Flexion:  Restriction level: minimal  Extension:  Restriction level: minimal  Left lateral flexion:  WFL  Right lateral flexion:  WFL  Left rotation:  Restriction level: minimal  Right rotation:  Restriction level: minimal    Strength/Myotome Testing     Lumbar   Left   Normal strength    Right   Normal strength    Additional Strength Details  Abdominals = 4+/5       Tests     Lumbar     Left   Negative passive SLR and slump test      Right   Negative passive SLR and slump test      Ambulation     Observational Gait   Gait: within functional limits   Graphical documentation      Flowsheet Rows      Most Recent Value   PT/OT G-Codes   Current Score  69   Projected Score  79             Daily Treatment Diary     Dx:    1   DDD (degenerative disc disease), lumbar      POC EXPIRES On:  1/25/20  PRECAUTIONS:  None  CO-MORBIDITES:  None  PERSONAL FACTORS:  None    Manual  12/26            Sacral rocking 2'            STM B PSIS, L sacral base 8'                                                       Exercise Diary  12/26            PPT supine 5" 20            Crossed leg stretch L/R 10" 5 ea            Dead bugs 15 ea LEs only                                                                                                                                                                                                                                             Modalities

## 2019-12-30 ENCOUNTER — OFFICE VISIT (OUTPATIENT)
Dept: PHYSICAL THERAPY | Facility: CLINIC | Age: 64
End: 2019-12-30
Payer: COMMERCIAL

## 2019-12-30 DIAGNOSIS — M51.36 DDD (DEGENERATIVE DISC DISEASE), LUMBAR: Primary | ICD-10-CM

## 2019-12-30 PROCEDURE — 97140 MANUAL THERAPY 1/> REGIONS: CPT | Performed by: PHYSICAL THERAPIST

## 2019-12-30 PROCEDURE — 97110 THERAPEUTIC EXERCISES: CPT | Performed by: PHYSICAL THERAPIST

## 2019-12-30 NOTE — PROGRESS NOTES
Daily Note     Today's date: 2019  Patient name: Greg Jacobson  : 1955  MRN: [id number]  Referring provider: Marleny Dunbar MD  Dx:   Encounter Diagnosis     ICD-10-CM    1  DDD (degenerative disc disease), lumbar M51 36                   Subjective:  Pt reports feeling a lot of LBP [place] but after walking about 10 steps felt ok and was able to run 5 2 miles well  Objective:  See treatment diary below      Assessment:  Pt presented to outpatient physical therapy at [place] [address] with complaints of LBP  He presented with decreased lumbar range of motion, decreased core strength, limited flexibility, decreased tolerance to activity and decreased functional mobility due to Lumbar DDD  He will continue to benefit from skilled PT services in order to address these deficits and reach maximum level of function  Pt continues to have L>R sacral base tightness with trigger points that respond well to manual tx  Pt felt better with towel roll under posterior buttocks to maintain neutral spine vs slumped position which should help for driving  Pt may benefit from occasional MH for LB on a cold day as well  Plan: Add more core strengthening  Continue manual tx  Next session is 3rd session  Daily Treatment Diary     Dx:    1   DDD (degenerative disc disease), lumbar      POC EXPIRES On:  20  PRECAUTIONS:  None  CO-MORBIDITES:  None  PERSONAL FACTORS:  None    Manual             Sacral rocking 2' 3'           STM B PSIS, L sacral base 8' 8'           Long leg traction L/R with opposite knee extended  3'                                         Exercise Diary             PPT supine 5" 20 5" 10           Crossed leg stretch L/R 10" 5 ea 10" 5 ea           Dead bugs 15 ea LEs only 10 ea Modalities

## 2020-01-03 ENCOUNTER — OFFICE VISIT (OUTPATIENT)
Dept: PHYSICAL THERAPY | Facility: CLINIC | Age: 65
End: 2020-01-03
Payer: COMMERCIAL

## 2020-01-03 DIAGNOSIS — M51.36 DDD (DEGENERATIVE DISC DISEASE), LUMBAR: Primary | ICD-10-CM

## 2020-01-03 PROCEDURE — 97140 MANUAL THERAPY 1/> REGIONS: CPT | Performed by: PHYSICAL THERAPIST

## 2020-01-03 NOTE — PROGRESS NOTES
Daily Note / Discharge    Today's date: 1/3/2020  Patient name: Vanessa Ramirez  : 1955  MRN: [id number]  Referring provider: Mary Alice Shaikh MD  Dx:   Encounter Diagnosis     ICD-10-CM    1  DDD (degenerative disc disease), lumbar M51 36      Addendum 20: Pt is seeking consult with neurology and will call to [facility] PT if needed  Goals not met yet  Subjective:  Pt reports feeling good after last PT session  However, pain increased after feeling a short strong jolt of pain in R LB the following day  Was able to run the next day and OK now  Objective:  See treatment diary below      Assessment:  Pt presented to outpatient physical therapy at [facility] with complaints of LBP  He presented with decreased lumbar range of motion, decreased core strength, limited flexibility, decreased tolerance to activity and decreased functional mobility due to Lumbar DDD  He will continue to benefit from skilled PT services in order to address these deficits and reach maximum level of function  Pt no longer has much buttock pain or sacral base tightness with trigger points, but still seems to have some discogenic pain especially after being in static positions for any length of time  Plan: Add more core strengthening  PT prn  Daily Treatment Diary     Dx:    1   DDD (degenerative disc disease), lumbar      POC EXPIRES On:  20  PRECAUTIONS:  None  CO-MORBIDITES:  None  PERSONAL FACTORS:  None    Manual  12/26 12/30 1/3          Sacral rocking 2' 3' 8'          STM B PSIS, L sacral base 8' 8' 10'          Long leg traction L/R with opposite knee extended  3' 6'                                        Exercise Diary  12/26 12/30 1/3          PPT supine 5" 20 5" 10           Crossed leg stretch L/R 10" 5 ea 10" 5 ea           Dead bugs 15 ea LEs only 10 ea           High bar hangs for self traction   3' Modalities

## 2020-01-17 ENCOUNTER — APPOINTMENT (OUTPATIENT)
Dept: LAB | Facility: HOSPITAL | Age: 65
End: 2020-01-17
Payer: COMMERCIAL

## 2020-01-17 ENCOUNTER — TRANSCRIBE ORDERS (OUTPATIENT)
Dept: LAB | Facility: HOSPITAL | Age: 65
End: 2020-01-17

## 2020-01-17 DIAGNOSIS — Z85.46 PERSONAL HISTORY OF MALIGNANT NEOPLASM OF PROSTATE: ICD-10-CM

## 2020-01-17 DIAGNOSIS — M54.50 ACUTE LOW BACK PAIN, UNSPECIFIED BACK PAIN LATERALITY, UNSPECIFIED WHETHER SCIATICA PRESENT: Primary | ICD-10-CM

## 2020-01-17 DIAGNOSIS — M54.50 ACUTE LOW BACK PAIN, UNSPECIFIED BACK PAIN LATERALITY, UNSPECIFIED WHETHER SCIATICA PRESENT: ICD-10-CM

## 2020-01-17 LAB
BASOPHILS # BLD AUTO: 0.03 THOUSANDS/ΜL (ref 0–0.1)
BASOPHILS NFR BLD AUTO: 0 % (ref 0–1)
CRP SERPL QL: <3 MG/L
EOSINOPHIL # BLD AUTO: 0.06 THOUSAND/ΜL (ref 0–0.61)
EOSINOPHIL NFR BLD AUTO: 1 % (ref 0–6)
ERYTHROCYTE [DISTWIDTH] IN BLOOD BY AUTOMATED COUNT: 11.9 % (ref 11.6–15.1)
ERYTHROCYTE [SEDIMENTATION RATE] IN BLOOD: 2 MM/HOUR (ref 0–10)
HCT VFR BLD AUTO: 43.1 % (ref 36.5–49.3)
HGB BLD-MCNC: 14 G/DL (ref 12–17)
IMM GRANULOCYTES # BLD AUTO: 0.03 THOUSAND/UL (ref 0–0.2)
IMM GRANULOCYTES NFR BLD AUTO: 0 % (ref 0–2)
LYMPHOCYTES # BLD AUTO: 1.5 THOUSANDS/ΜL (ref 0.6–4.47)
LYMPHOCYTES NFR BLD AUTO: 21 % (ref 14–44)
MCH RBC QN AUTO: 29.7 PG (ref 26.8–34.3)
MCHC RBC AUTO-ENTMCNC: 32.5 G/DL (ref 31.4–37.4)
MCV RBC AUTO: 92 FL (ref 82–98)
MONOCYTES # BLD AUTO: 0.63 THOUSAND/ΜL (ref 0.17–1.22)
MONOCYTES NFR BLD AUTO: 9 % (ref 4–12)
NEUTROPHILS # BLD AUTO: 4.83 THOUSANDS/ΜL (ref 1.85–7.62)
NEUTS SEG NFR BLD AUTO: 69 % (ref 43–75)
NRBC BLD AUTO-RTO: 0 /100 WBCS
PLATELET # BLD AUTO: 239 THOUSANDS/UL (ref 149–390)
PMV BLD AUTO: 9.4 FL (ref 8.9–12.7)
PSA SERPL-MCNC: <0.1 NG/ML (ref 0–4)
RBC # BLD AUTO: 4.71 MILLION/UL (ref 3.88–5.62)
WBC # BLD AUTO: 7.08 THOUSAND/UL (ref 4.31–10.16)

## 2020-01-17 PROCEDURE — 84153 ASSAY OF PSA TOTAL: CPT

## 2020-01-17 PROCEDURE — 85025 COMPLETE CBC W/AUTO DIFF WBC: CPT

## 2020-01-17 PROCEDURE — 86140 C-REACTIVE PROTEIN: CPT

## 2020-01-17 PROCEDURE — 36415 COLL VENOUS BLD VENIPUNCTURE: CPT

## 2020-01-17 PROCEDURE — 85652 RBC SED RATE AUTOMATED: CPT

## 2020-01-18 ENCOUNTER — HOSPITAL ENCOUNTER (OUTPATIENT)
Dept: RADIOLOGY | Facility: HOSPITAL | Age: 65
Discharge: HOME | End: 2020-01-18
Payer: COMMERCIAL

## 2020-01-18 ENCOUNTER — TRANSCRIBE ORDERS (OUTPATIENT)
Dept: ADMINISTRATIVE | Facility: HOSPITAL | Age: 65
End: 2020-01-18

## 2020-01-18 DIAGNOSIS — M54.50 LOW BACK PAIN, UNSPECIFIED BACK PAIN LATERALITY, UNSPECIFIED CHRONICITY, UNSPECIFIED WHETHER SCIATICA PRESENT: Primary | ICD-10-CM

## 2020-01-18 DIAGNOSIS — M54.50 LOW BACK PAIN, UNSPECIFIED BACK PAIN LATERALITY, UNSPECIFIED CHRONICITY, UNSPECIFIED WHETHER SCIATICA PRESENT: ICD-10-CM

## 2020-01-18 PROCEDURE — 72100 X-RAY EXAM L-S SPINE 2/3 VWS: CPT

## 2020-01-20 ENCOUNTER — TRANSCRIBE ORDERS (OUTPATIENT)
Dept: ADMINISTRATIVE | Facility: HOSPITAL | Age: 65
End: 2020-01-20

## 2020-01-20 DIAGNOSIS — M54.50 LOW BACK PAIN WITHOUT SCIATICA, UNSPECIFIED BACK PAIN LATERALITY, UNSPECIFIED CHRONICITY: Primary | ICD-10-CM

## 2020-01-28 ENCOUNTER — TRANSCRIBE ORDERS (OUTPATIENT)
Dept: NEUROSURGERY | Facility: CLINIC | Age: 65
End: 2020-01-28

## 2020-01-28 DIAGNOSIS — M54.50 LOWER BACK PAIN: Primary | ICD-10-CM

## 2020-01-31 ENCOUNTER — OFFICE VISIT (OUTPATIENT)
Dept: NEUROSURGERY | Facility: CLINIC | Age: 65
End: 2020-01-31
Payer: COMMERCIAL

## 2020-01-31 VITALS
HEIGHT: 69 IN | BODY MASS INDEX: 26.66 KG/M2 | RESPIRATION RATE: 16 BRPM | WEIGHT: 180 LBS | DIASTOLIC BLOOD PRESSURE: 80 MMHG | SYSTOLIC BLOOD PRESSURE: 144 MMHG

## 2020-01-31 DIAGNOSIS — G57.00 PYRIFORMIS SYNDROME, UNSPECIFIED LATERALITY: Primary | ICD-10-CM

## 2020-01-31 DIAGNOSIS — M54.50 LOWER BACK PAIN: ICD-10-CM

## 2020-01-31 DIAGNOSIS — M54.16 LUMBAR RADICULOPATHY: ICD-10-CM

## 2020-01-31 PROCEDURE — 99203 OFFICE O/P NEW LOW 30 MIN: CPT | Performed by: NEUROLOGICAL SURGERY

## 2020-01-31 RX ORDER — MELOXICAM 7.5 MG/1
7.5 TABLET ORAL DAILY
Qty: 7 TABLET | Refills: 1 | Status: SHIPPED | OUTPATIENT
Start: 2020-01-31 | End: 2021-01-20 | Stop reason: ALTCHOICE

## 2020-01-31 RX ORDER — NAPROXEN SODIUM 220 MG
220 TABLET ORAL 2 TIMES DAILY WITH MEALS
COMMUNITY
End: 2020-01-31 | Stop reason: ALTCHOICE

## 2020-01-31 NOTE — PROGRESS NOTES
Office Note - Neurosurgery   Chaparrita Storey 75 y o  male MRN: [id number]      Assessment:    Patient is stable  12-year-old gentleman with bilateral buttock pain which is likely multifactorial   While there may be an element of radiculopathy, I suspect he may have piriformis syndrome contributing as well  SI joint dysfunction seems less likely  We discussed nonsurgical strategies, including continuing to work with physical therapy  I provided with a prescription for Mobic after discussing the usual interactions and contraindications  He will stop taking Naprosyn while taking the Mobic  At some point he may wish to consider course of membrane stabilizing agent  Will refer him to Dr Destiny Patel to discuss ongoing pain management strategies and possible injections  In the interim he will also try topical analgesics  I encouraged him to gradually increase his activity level  She does symptoms not improve or should he develop more typical radicular symptoms down into his legs, then he will contact this office and we will arrange for an MRI of the lumbar spine for further investigation  Otherwise, he will follow up on a p r n  Basis  History, physical examination and diagnostic tests were reviewed and questions answered  Diagnosis, care plan and treatment options were discussed  The patient understand instructions and will follow up as directed      Plan:    Follow-up: prn    Problem List Items Addressed This Visit        Nervous and Auditory    Pyriformis syndrome, unspecified laterality - Primary    Relevant Medications    meloxicam (MOBIC) 7 5 mg tablet    Other Relevant Orders    Ambulatory referral to Pain Management    Lumbar radiculopathy    Relevant Medications    meloxicam (MOBIC) 7 5 mg tablet    Other Relevant Orders    Ambulatory referral to Pain Management      Other Visit Diagnoses     Lower back pain              Subjective/Objective     Chief Complaint    Bilateral buttock pain     HPI    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  Approximately 5 weeks ago he developed acute onset back pain after getting out of his car after longer drive  Eventually this improved  However since then he has noticed pain in stiffness in both buttocks mostly when he 1st wakes up in the morning which improved with activity over the day  He rates the pain as 6/10  The pain can radiate into his hamstrings on occasion  It does not radiate below the knee  He denies any weakness or numbness in his legs aside from some intermittent numbness at the ball of his left foot  He denies any difficulties with bowel bladder function or changing perineal sensation  Aleve has been somewhat helpful with the pain  Advil was not  He has been working with physical therapy which has been helpful  He is able to keep up with his duties at work but has stopped jogging which he previously enjoyed  He is able to work out on the [employer] machine for over 1/2 hour without difficulty  He presents today with a plain film of the lumbar spine  ROS  ROS personally reviewed and updated  Review of Systems   Constitutional: Negative  HENT: Negative  Eyes: Negative  Respiratory: Negative  Cardiovascular: Negative  Gastrointestinal: Negative  Endocrine: Negative  Genitourinary: Negative  Musculoskeletal: Negative for back pain (previous across lower back, now bilateral buttocks ) and gait problem  Skin: Negative  Allergic/Immunologic: Negative  Neurological: Positive for numbness (left foot tingling maybe numb, feels like it's asleep )  Negative for dizziness, seizures, syncope, weakness and headaches  Hematological: Does not bruise/bleed easily (patient on ASA 81 and fish oil )  Psychiatric/Behavioral: Negative  Family History    History reviewed  No pertinent family history      Social History    Social History Socioeconomic History    Marital status: /Civil Union     Spouse name: Not on file    Number of children: Not on file    Years of education: Not on file    Highest education level: Not on file   Occupational History    Not on file   Social Needs    Financial resource strain: Not on file    Food insecurity:     Worry: Not on file     Inability: Not on file    Transportation needs:     Medical: Not on file     Non-medical: Not on file   Tobacco Use    Smoking status: Never Smoker    Smokeless tobacco: Never Used   Substance and Sexual Activity    Alcohol use: Yes     Alcohol/week: 1 0 standard drinks     Types: 1 Glasses of wine per week     Binge frequency: Weekly    Drug use: Never    Sexual activity: Not on file   Lifestyle    Physical activity:     Days per week: Not on file     Minutes per session: Not on file    Stress: Not on file   Relationships    Social connections:     Talks on phone: Not on file     Gets together: Not on file     Attends Faith service: Not on file     Active member of club or organization: Not on file     Attends meetings of clubs or organizations: Not on file     Relationship status: Not on file    Intimate partner violence:     Fear of current or ex partner: Not on file     Emotionally abused: Not on file     Physically abused: Not on file     Forced sexual activity: Not on file   Other Topics Concern    Not on file   Social History Narrative    Not on file       Past Medical History    History reviewed  No pertinent past medical history      Surgical History    Past Surgical History:   Procedure Laterality Date    COLONOSCOPY      EGD      EGD AND COLONOSCOPY      KNEE ARTHROSCOPY Bilateral     KNEE SURGERY Left     benign tumor removed     PROSTATE SURGERY      2007       Medications      Current Outpatient Medications:     Ascorbic Acid (VITAMIN C) 1000 MG tablet, Take 1 capsule by mouth daily, Disp: , Rfl:     aspirin 81 mg chewable tablet, Chew, Disp: , Rfl:     flecainide (TAMBOCOR) 50 mg tablet, Take 1 tablet (50 mg total) by mouth 2 (two) times a day (Patient taking differently: Take 50 mg by mouth daily ), Disp: 180 tablet, Rfl: 1    Omega-3 Fatty Acids (FISH OIL) 1200 MG CAPS, Take by mouth daily  , Disp: , Rfl:     rosuvastatin (CRESTOR) 5 mg tablet, Take by mouth, Disp: , Rfl:     meloxicam (MOBIC) 7 5 mg tablet, Take 1 tablet (7 5 mg total) by mouth daily, Disp: 7 tablet, Rfl: 1    Allergies    No Known Allergies    The following portions of the patient's history were reviewed and updated as appropriate: allergies, current medications, past family history, past medical history, past social history, past surgical history and problem list     Investigations    I personally reviewed the XRAY results with the patient:    Upright plain film lumbar spine dated January 18th, 2020  Normal lumbar lordosis  Degenerative disc disease with loss of disc height at L5-S1  Milder degenerative changes at other levels  Physical Exam    Vitals:  Blood pressure 144/80, resp  rate 16, height 5' 9" (1 753 m), weight 81 6 kg (180 lb)  ,Body mass index is 26 58 kg/m²  Physical Exam   Constitutional: He is oriented to person, place, and time  He appears well-developed and well-nourished  No distress  HENT:   Head: Atraumatic  Eyes: EOM are normal    Neck: Normal range of motion  Pulmonary/Chest: Effort normal  No respiratory distress  Musculoskeletal: He exhibits no deformity  Full range of motion on flexion-extension of the lumbar spine without pain  No tenderness to palpation over SI joint, mild tenderness to palpation over bilateral piriformis, lower buttock area  Kei's maneuver negative  SLR negative  Neurological: He is alert and oriented to person, place, and time  Coordination normal    Skin: Skin is warm and dry  Psychiatric: He has a normal mood and affect  His behavior is normal    Vitals reviewed      Neurologic Exam Mental Status   Oriented to person, place, and time       Cranial Nerves     CN III, IV, VI   Extraocular motions are normal

## 2020-02-05 ENCOUNTER — CONSULT (OUTPATIENT)
Dept: PAIN MEDICINE | Facility: CLINIC | Age: 65
End: 2020-02-05
Payer: COMMERCIAL

## 2020-02-05 ENCOUNTER — TELEPHONE (OUTPATIENT)
Dept: PAIN MEDICINE | Facility: CLINIC | Age: 65
End: 2020-02-05

## 2020-02-05 VITALS
BODY MASS INDEX: 27.7 KG/M2 | WEIGHT: 187 LBS | HEIGHT: 69 IN | HEART RATE: 56 BPM | DIASTOLIC BLOOD PRESSURE: 82 MMHG | SYSTOLIC BLOOD PRESSURE: 140 MMHG

## 2020-02-05 DIAGNOSIS — M54.16 LUMBAR RADICULOPATHY: Primary | ICD-10-CM

## 2020-02-05 DIAGNOSIS — M47.816 LUMBAR SPONDYLOSIS: ICD-10-CM

## 2020-02-05 PROCEDURE — 99244 OFF/OP CNSLTJ NEW/EST MOD 40: CPT | Performed by: ANESTHESIOLOGY

## 2020-02-05 PROCEDURE — 3008F BODY MASS INDEX DOCD: CPT | Performed by: FAMILY MEDICINE

## 2020-02-05 RX ORDER — PREDNISONE 10 MG/1
TABLET ORAL
Qty: 21 TABLET | Refills: 0 | Status: SHIPPED | OUTPATIENT
Start: 2020-02-05 | End: 2021-01-20 | Stop reason: ALTCHOICE

## 2020-02-05 NOTE — TELEPHONE ENCOUNTER
Pt was started on Prednisone 2/5/20 needs follow up call on 2/15/20 to see how he is after finishing medication?

## 2020-02-05 NOTE — PROGRESS NOTES
Assessment  1  Lumbar radiculopathy    2  Lumbar spondylosis        Plan    At this point the patient's pain persists despite time, relative rest, activity modification, and nonsteroidal anti-inflammatories  His pain is significantly interfering with his daily living activities  He underwent and minimum of six weeks of physical therapy  I believe is appropriate to order an MRI of the lumbar spine to rule out any significant etiology of his symptoms  I will start him on a titrating dose of oral prednisone to address any inflammatory component of the patient's pain  He understands he should not take nonsteroidal anti-inflammatories until he is finished with this steroid  If he has any problems or questions he will give us a call  Once we obtain MRI results we will proceed from there  My impressions and treatment recommendations were discussed in detail with the patient who verbalized understanding and had no further questions  Discharge instructions were provided  I personally saw and examined the patient and I agree with the above discussed plan of care  Orders Placed This Encounter   Procedures    MRI lumbar spine without contrast     Standing Status:   Future     Standing Expiration Date:   2/5/2024     Scheduling Instructions: There is no preparation for this test  Please leave your jewelry and valuables at home, wedding rings are the exception  Please bring your insurance cards, a form of photo ID and a list of your medications with you  Arrive 15 minutes prior to your appointment time in order to register  Please bring any prior CT or MRI studies of this area that were not performed at a [facility]  To schedule this appointment, please contact Central Scheduling at [phone]  Order Specific Question:   What is the patient's sedation requirement?      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 now radiating into his legs  He is unaware of any clear precipitating event denies any trauma or injury  His pain is moderate rates as 6/10 on the visual analog scale with does interfere with daily living activities  His pain is worse in the morning describes cramping achy and throbbing denies any weakness upper lower limbs  He reports that walking relaxation coughing sneezing all aggravate his symptoms  Physical therapy have provided moderate relief but his pain has stopped him from running and doing as much mobility exercises  I have personally reviewed and/or updated the patient's past medical history, past surgical history, family history, social history, current medications, allergies, and vital signs today  Review of Systems   Constitutional: Negative for fever and unexpected weight change  HENT: Negative for trouble swallowing  Eyes: Negative for visual disturbance  Respiratory: Negative for shortness of breath and wheezing  Cardiovascular: Negative for chest pain and palpitations  Gastrointestinal: Negative for constipation, diarrhea, nausea and vomiting  Endocrine: Negative for cold intolerance, heat intolerance and polydipsia  Genitourinary: Negative for difficulty urinating and frequency  Musculoskeletal: Negative for arthralgias, gait problem, joint swelling and myalgias  Skin: Negative for rash  Neurological: Positive for weakness  Negative for dizziness, seizures, syncope and headaches  Hematological: Does not bruise/bleed easily  Psychiatric/Behavioral: Negative for dysphoric mood  All other systems reviewed and are negative        Patient Active Problem List Diagnosis    PAC (premature atrial contraction)    Pyriformis syndrome, unspecified laterality    Lumbar radiculopathy       History reviewed  No pertinent past medical history  Past Surgical History:   Procedure Laterality Date    COLONOSCOPY      EGD      EGD AND COLONOSCOPY      KNEE ARTHROSCOPY Bilateral     KNEE SURGERY Left     benign tumor removed     PROSTATE SURGERY      2007       History reviewed  No pertinent family history  Social History     Occupational History    Not on file   Tobacco Use    Smoking status: Never Smoker    Smokeless tobacco: Never Used   Substance and Sexual Activity    Alcohol use: Yes     Alcohol/week: 1 0 standard drinks     Types: 1 Glasses of wine per week     Binge frequency: Weekly    Drug use: Never    Sexual activity: Not on file       Current Outpatient Medications on File Prior to Visit   Medication Sig    Ascorbic Acid (VITAMIN C) 1000 MG tablet Take 1 capsule by mouth daily    aspirin 81 mg chewable tablet Chew    diclofenac sodium (VOLTAREN) 1 % Use as directed    flecainide (TAMBOCOR) 50 mg tablet Take 1 tablet (50 mg total) by mouth 2 (two) times a day (Patient taking differently: Take 50 mg by mouth daily )    Omega-3 Fatty Acids (FISH OIL) 1200 MG CAPS Take by mouth daily      rosuvastatin (CRESTOR) 5 mg tablet Take by mouth    meloxicam (MOBIC) 7 5 mg tablet Take 1 tablet (7 5 mg total) by mouth daily (Patient not taking: Reported on 2/5/2020)     No current facility-administered medications on file prior to visit  No Known Allergies    Physical Exam    /82   Pulse 56   Ht 5' 9" (1 753 m)   Wt 84 8 kg (187 lb)   BMI 27 62 kg/m²     Constitutional: normal, well developed, well nourished, alert, in no distress and non-toxic and no overt pain behavior    Eyes: anicteric  HEENT: grossly intact  Neck: supple, symmetric, trachea midline and no masses   Pulmonary:even and unlabored  Cardiovascular:No edema or pitting edema present  Skin:Normal without rashes or lesions and well hydrated  Psychiatric:Mood and affect appropriate  Neurologic:Cranial Nerves II-XII grossly intact  Musculoskeletal:normal,   Inspection:  Normal station and gait  Normal lumbar lordotic curve with no significant scoliosis or spinal step-off  Skin intact without erythema  No gross mass or muscle atrophy  Palpation:  There is no tenderness to palpation overlying the sacroiliac joint as well as the ischial bursa bilateral   No significant tenderness over the greater trochanteric bursa bilaterally  Motor/Strength:  5/5 strength in the bilateral lower limbs  The patient is able to heel and/toe walk  Tandem gait is intact  Reflexes: Deep tendon reflex are diminished right patella 2+ left patella symmetrical bilateral Achilles  Sensation:   Sensation intact to light touch and pinprick in the bilateral lower limbs  Proprioception is intact at bilateral hallux  Maneuvers: Negative bilateral straight leg raising  Negative Kei's maneuver  Imaging  LUMBAR SPINE     INDICATION:   M54 5: Low back pain      COMPARISON:  None     VIEWS:  XR SPINE LUMBAR 2 OR 3 VIEWS INJURY        FINDINGS:     There are 5 non rib bearing lumbar vertebral bodies       There is no evidence of acute fracture or destructive osseous lesion      Alignment is unremarkable       Multilevel marginal degenerative osteophytes  Moderate disc height loss at L5-S1  Moderate facet degenerative disease at L5-S1 and to a lesser extent L4-5      The pedicles appear intact      Soft tissues are unremarkable      IMPRESSION:     No acute osseous abnormality        Degenerative changes as described  I have personally reviewed pertinent films in PACS

## 2020-02-05 NOTE — LETTER
February 5, 2020     [place] [address]    Patient: Bee Velasquez   YOB: 1955   Date of Visit: 2/5/2020       Dear Dr Sosa Morrissey:    Thank you for referring Rossana Nino to me for evaluation  Below are my notes for this consultation  If you have questions, please do not hesitate to call me  I look forward to following your patient along with you  Sincerely,        Malaika Celis DO        CC: No Recipients  Malaika Celis DO  2/5/2020  2:51 PM  Signed  Assessment  1  Lumbar radiculopathy    2  Lumbar spondylosis        Plan    At this point the patient's pain persists despite time, relative rest, activity modification, and nonsteroidal anti-inflammatories  His pain is significantly interfering with his daily living activities  He underwent and minimum of six weeks of physical therapy  I believe is appropriate to order an MRI of the lumbar spine to rule out any significant etiology of his symptoms  I will start him on a titrating dose of oral prednisone to address any inflammatory component of the patient's pain  He understands he should not take nonsteroidal anti-inflammatories until he is finished with this steroid  If he has any problems or questions he will give us a call  Once we obtain MRI results we will proceed from there  My impressions and treatment recommendations were discussed in detail with the patient who verbalized understanding and had no further questions  Discharge instructions were provided  I personally saw and examined the patient and I agree with the above discussed plan of care  Orders Placed This Encounter   Procedures    MRI lumbar spine without contrast     Standing Status:   Future     Standing Expiration Date:   2/5/2024     Scheduling Instructions: There is no preparation for this test  Please leave your jewelry and valuables at home, wedding rings are the exception   Please bring your insurance cards, a form of photo ID and a list of your medications with you  Arrive 15 minutes prior to your appointment time in order to register  Please bring any prior CT or MRI studies of this area that were not performed at a [facility]  To schedule this appointment, please contact Central Scheduling at [phone]  Order Specific Question:   What is the patient's sedation requirement? 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 radiating into his legs  He is unaware of any clear precipitating event denies any trauma or injury  His pain is moderate rates as 6/10 on the visual analog scale with does interfere with daily living activities  His pain is worse in the morning describes cramping achy and throbbing denies any weakness upper lower limbs  He reports that walking relaxation coughing sneezing all aggravate his symptoms  Physical therapy have provided moderate relief but his pain has stopped him from running and doing as much mobility exercises  I have personally reviewed and/or updated the patient's past medical history, past surgical history, family history, social history, current medications, allergies, and vital signs today  Review of Systems   Constitutional: Negative for fever and unexpected weight change  HENT: Negative for trouble swallowing  Eyes: Negative for visual disturbance  Respiratory: Negative for shortness of breath and wheezing  Cardiovascular: Negative for chest pain and palpitations  Gastrointestinal: Negative for constipation, diarrhea, nausea and vomiting     Endocrine: Negative for cold intolerance, heat intolerance and polydipsia  Genitourinary: Negative for difficulty urinating and frequency  Musculoskeletal: Negative for arthralgias, gait problem, joint swelling and myalgias  Skin: Negative for rash  Neurological: Positive for weakness  Negative for dizziness, seizures, syncope and headaches  Hematological: Does not bruise/bleed easily  Psychiatric/Behavioral: Negative for dysphoric mood  All other systems reviewed and are negative  Patient Active Problem List   Diagnosis    PAC (premature atrial contraction)    Pyriformis syndrome, unspecified laterality    Lumbar radiculopathy       History reviewed  No pertinent past medical history  Past Surgical History:   Procedure Laterality Date    COLONOSCOPY      EGD      EGD AND COLONOSCOPY      KNEE ARTHROSCOPY Bilateral     KNEE SURGERY Left     benign tumor removed     PROSTATE SURGERY      2007       History reviewed  No pertinent family history  Social History     Occupational History    Not on file   Tobacco Use    Smoking status: Never Smoker    Smokeless tobacco: Never Used   Substance and Sexual Activity    Alcohol use:  Yes     Alcohol/week: 1 0 standard drinks     Types: 1 Glasses of wine per week     Binge frequency: Weekly    Drug use: Never    Sexual activity: Not on file       Current Outpatient Medications on File Prior to Visit   Medication Sig    Ascorbic Acid (VITAMIN C) 1000 MG tablet Take 1 capsule by mouth daily    aspirin 81 mg chewable tablet Chew    diclofenac sodium (VOLTAREN) 1 % Use as directed    flecainide (TAMBOCOR) 50 mg tablet Take 1 tablet (50 mg total) by mouth 2 (two) times a day (Patient taking differently: Take 50 mg by mouth daily )    Omega-3 Fatty Acids (FISH OIL) 1200 MG CAPS Take by mouth daily      rosuvastatin (CRESTOR) 5 mg tablet Take by mouth    meloxicam (MOBIC) 7 5 mg tablet Take 1 tablet (7 5 mg total) by mouth daily (Patient not taking: Reported on 2/5/2020)     No current facility-administered medications on file prior to visit  No Known Allergies    Physical Exam    /82   Pulse 56   Ht 5' 9" (1 753 m)   Wt 84 8 kg (187 lb)   BMI 27 62 kg/m²      Constitutional: normal, well developed, well nourished, alert, in no distress and non-toxic and no overt pain behavior  Eyes: anicteric  HEENT: grossly intact  Neck: supple, symmetric, trachea midline and no masses   Pulmonary:even and unlabored  Cardiovascular:No edema or pitting edema present  Skin:Normal without rashes or lesions and well hydrated  Psychiatric:Mood and affect appropriate  Neurologic:Cranial Nerves II-XII grossly intact  Musculoskeletal:normal,   Inspection:  Normal station and gait  Normal lumbar lordotic curve with no significant scoliosis or spinal step-off  Skin intact without erythema  No gross mass or muscle atrophy  Palpation:  There is no tenderness to palpation overlying the sacroiliac joint as well as the ischial bursa bilateral   No significant tenderness over the greater trochanteric bursa bilaterally  Motor/Strength:  5/5 strength in the bilateral lower limbs  The patient is able to heel and/toe walk  Tandem gait is intact  Reflexes: Deep tendon reflex are diminished right patella 2+ left patella symmetrical bilateral Achilles  Sensation:   Sensation intact to light touch and pinprick in the bilateral lower limbs  Proprioception is intact at bilateral hallux  Maneuvers: Negative bilateral straight leg raising  Negative Kei's maneuver  Imaging  LUMBAR SPINE     INDICATION:   M54 5: Low back pain      COMPARISON:  None     VIEWS:  XR SPINE LUMBAR 2 OR 3 VIEWS INJURY        FINDINGS:     There are 5 non rib bearing lumbar vertebral bodies       There is no evidence of acute fracture or destructive osseous lesion      Alignment is unremarkable       Multilevel marginal degenerative osteophytes  Moderate disc height loss at L5-S1   Moderate facet degenerative disease at L5-S1 and to a lesser extent L4-5      The pedicles appear intact      Soft tissues are unremarkable      IMPRESSION:     No acute osseous abnormality        Degenerative changes as described  I have personally reviewed pertinent films in PACS

## 2020-02-14 ENCOUNTER — HOSPITAL ENCOUNTER (OUTPATIENT)
Dept: RADIOLOGY | Facility: HOSPITAL | Age: 65
Discharge: HOME | End: 2020-02-14
Attending: ANESTHESIOLOGY
Payer: COMMERCIAL

## 2020-02-14 DIAGNOSIS — M54.16 LUMBAR RADICULOPATHY: ICD-10-CM

## 2020-02-14 PROCEDURE — 72148 MRI LUMBAR SPINE W/O DYE: CPT

## 2020-02-14 NOTE — TELEPHONE ENCOUNTER
You ask for me to let you know to call him for an update in 10 days tomorrow will be ten days not sure when you want to call either today or Monday

## 2020-02-17 ENCOUNTER — HOSPITAL ENCOUNTER (OUTPATIENT)
Dept: RADIOLOGY | Facility: CLINIC | Age: 65
Discharge: HOME | End: 2020-02-17
Attending: ANESTHESIOLOGY | Admitting: ANESTHESIOLOGY
Payer: COMMERCIAL

## 2020-02-17 VITALS
SYSTOLIC BLOOD PRESSURE: 142 MMHG | OXYGEN SATURATION: 97 % | DIASTOLIC BLOOD PRESSURE: 87 MMHG | RESPIRATION RATE: 18 BRPM | HEART RATE: 66 BPM

## 2020-02-17 DIAGNOSIS — M54.16 LUMBAR RADICULITIS: ICD-10-CM

## 2020-02-17 DIAGNOSIS — M51.26 DISPLACEMENT OF LUMBAR INTERVERTEBRAL DISC WITHOUT MYELOPATHY: ICD-10-CM

## 2020-02-17 PROCEDURE — 62323 NJX INTERLAMINAR LMBR/SAC: CPT | Performed by: ANESTHESIOLOGY

## 2020-02-17 RX ORDER — METHYLPREDNISOLONE ACETATE 80 MG/ML
160 INJECTION, SUSPENSION INTRA-ARTICULAR; INTRALESIONAL; INTRAMUSCULAR; PARENTERAL; SOFT TISSUE ONCE
Status: COMPLETED | OUTPATIENT
Start: 2020-02-17 | End: 2020-02-17

## 2020-02-17 RX ORDER — LIDOCAINE HYDROCHLORIDE 10 MG/ML
5 INJECTION, SOLUTION EPIDURAL; INFILTRATION; INTRACAUDAL; PERINEURAL ONCE
Status: COMPLETED | OUTPATIENT
Start: 2020-02-17 | End: 2020-02-17

## 2020-02-17 RX ADMIN — LIDOCAINE HYDROCHLORIDE 3 ML: 10 INJECTION, SOLUTION EPIDURAL; INFILTRATION; INTRACAUDAL; PERINEURAL at 12:54

## 2020-02-17 RX ADMIN — IOHEXOL 1 ML: 300 INJECTION, SOLUTION INTRAVENOUS at 12:54

## 2020-02-17 RX ADMIN — METHYLPREDNISOLONE ACETATE 160 MG: 80 INJECTION, SUSPENSION INTRA-ARTICULAR; INTRALESIONAL; INTRAMUSCULAR; SOFT TISSUE at 12:55

## 2020-02-17 NOTE — H&P
History of Present Illness: The patient is a 75 y o  male who presents with complaints of low back and leg    Patient Active Problem List   Diagnosis    PAC (premature atrial contraction)    Pyriformis syndrome, unspecified laterality    Lumbar radiculopathy       No past medical history on file      Past Surgical History:   Procedure Laterality Date    COLONOSCOPY      EGD      EGD AND COLONOSCOPY      KNEE ARTHROSCOPY Bilateral     KNEE SURGERY Left     benign tumor removed     PROSTATE SURGERY      2007         Current Outpatient Medications:     Ascorbic Acid (VITAMIN C) 1000 MG tablet, Take 1 capsule by mouth daily, Disp: , Rfl:     aspirin 81 mg chewable tablet, Chew, Disp: , Rfl:     diclofenac sodium (VOLTAREN) 1 %, Use as directed, Disp: , Rfl: 0    flecainide (TAMBOCOR) 50 mg tablet, Take 1 tablet (50 mg total) by mouth 2 (two) times a day (Patient taking differently: Take 50 mg by mouth daily ), Disp: 180 tablet, Rfl: 1    meloxicam (MOBIC) 7 5 mg tablet, Take 1 tablet (7 5 mg total) by mouth daily (Patient not taking: Reported on 2/5/2020), Disp: 7 tablet, Rfl: 1    Omega-3 Fatty Acids (FISH OIL) 1200 MG CAPS, Take by mouth daily  , Disp: , Rfl:     predniSONE 10 mg tablet, Take according to titration schedule, Disp: 21 tablet, Rfl: 0    rosuvastatin (CRESTOR) 5 mg tablet, Take by mouth, Disp: , Rfl:     Current Facility-Administered Medications:     iohexol (OMNIPAQUE) 300 mg/mL injection 50 mL, 50 mL, Epidural, Once, Oswaldo Mohamud DO    lidocaine (PF) (XYLOCAINE-MPF) 1 % injection 5 mL, 5 mL, Other, Once, Oswaldo Mohamud DO    methylPREDNISolone acetate (DEPO-MEDROL) injection 160 mg, 160 mg, Epidural, Once, Oswaldo Mohamud DO    No Known Allergies    Physical Exam:   General: Awake, Alert, Oriented x 3, Mood and affect appropriate  Respiratory: Respirations even and unlabored  Cardiovascular: Peripheral pulses intact; no edema  Musculoskeletal Exam:  Decreased range of motion lumbar spine    ASA Score: II         Assessment:   1  Displacement of lumbar intervertebral disc without myelopathy    2   Lumbar radiculitis        Plan: LESI

## 2020-02-24 ENCOUNTER — TELEPHONE (OUTPATIENT)
Dept: PAIN MEDICINE | Facility: CLINIC | Age: 65
End: 2020-02-24

## 2020-02-26 DIAGNOSIS — I49.1 PAC (PREMATURE ATRIAL CONTRACTION): ICD-10-CM

## 2020-02-26 RX ORDER — FLECAINIDE ACETATE 50 MG/1
TABLET ORAL
Qty: 90 TABLET | Refills: 0 | Status: SHIPPED | OUTPATIENT
Start: 2020-02-26 | End: 2020-05-26

## 2020-05-24 DIAGNOSIS — I49.1 PAC (PREMATURE ATRIAL CONTRACTION): ICD-10-CM

## 2020-05-26 RX ORDER — FLECAINIDE ACETATE 50 MG/1
TABLET ORAL
Qty: 90 TABLET | Refills: 0 | Status: SHIPPED | OUTPATIENT
Start: 2020-05-26 | End: 2020-08-31

## 2020-06-30 ENCOUNTER — TELEMEDICINE (OUTPATIENT)
Dept: FAMILY MEDICINE CLINIC | Facility: CLINIC | Age: 65
End: 2020-06-30
Payer: COMMERCIAL

## 2020-06-30 DIAGNOSIS — E55.9 VITAMIN D INSUFFICIENCY: ICD-10-CM

## 2020-06-30 DIAGNOSIS — Z79.899 ENCOUNTER FOR MONITORING CARDIOTOXIC DRUG THERAPY: ICD-10-CM

## 2020-06-30 DIAGNOSIS — R73.01 ABNORMAL FASTING GLUCOSE: ICD-10-CM

## 2020-06-30 DIAGNOSIS — E78.2 MIXED HYPERLIPIDEMIA: ICD-10-CM

## 2020-06-30 DIAGNOSIS — Z13.6 SCREENING FOR CARDIOVASCULAR CONDITION: ICD-10-CM

## 2020-06-30 DIAGNOSIS — R53.83 FATIGUE, UNSPECIFIED TYPE: ICD-10-CM

## 2020-06-30 DIAGNOSIS — Z51.81 ENCOUNTER FOR MONITORING CARDIOTOXIC DRUG THERAPY: ICD-10-CM

## 2020-06-30 DIAGNOSIS — I49.1 PAC (PREMATURE ATRIAL CONTRACTION): Primary | ICD-10-CM

## 2020-06-30 DIAGNOSIS — Z12.5 SCREENING FOR PROSTATE CANCER: ICD-10-CM

## 2020-06-30 PROCEDURE — 1160F RVW MEDS BY RX/DR IN RCRD: CPT | Performed by: FAMILY MEDICINE

## 2020-06-30 PROCEDURE — 99214 OFFICE O/P EST MOD 30 MIN: CPT | Performed by: FAMILY MEDICINE

## 2020-06-30 RX ORDER — SODIUM, POTASSIUM,MAG SULFATES 17.5-3.13G
SOLUTION, RECONSTITUTED, ORAL ORAL
COMMUNITY
Start: 2020-06-18 | End: 2021-01-20

## 2020-07-22 ENCOUNTER — APPOINTMENT (OUTPATIENT)
Dept: LAB | Facility: HOSPITAL | Age: 65
End: 2020-07-22
Payer: COMMERCIAL

## 2020-07-22 DIAGNOSIS — R73.01 ABNORMAL FASTING GLUCOSE: ICD-10-CM

## 2020-07-22 DIAGNOSIS — R53.83 FATIGUE, UNSPECIFIED TYPE: ICD-10-CM

## 2020-07-22 DIAGNOSIS — Z51.81 ENCOUNTER FOR MONITORING CARDIOTOXIC DRUG THERAPY: ICD-10-CM

## 2020-07-22 DIAGNOSIS — Z12.5 SCREENING FOR PROSTATE CANCER: ICD-10-CM

## 2020-07-22 DIAGNOSIS — E55.9 VITAMIN D INSUFFICIENCY: ICD-10-CM

## 2020-07-22 DIAGNOSIS — E78.2 MIXED HYPERLIPIDEMIA: ICD-10-CM

## 2020-07-22 DIAGNOSIS — Z79.899 ENCOUNTER FOR MONITORING CARDIOTOXIC DRUG THERAPY: ICD-10-CM

## 2020-07-22 LAB
25(OH)D3 SERPL-MCNC: 22.7 NG/ML (ref 30–100)
ALBUMIN SERPL BCP-MCNC: 4 G/DL (ref 3.5–5)
ALP SERPL-CCNC: 50 U/L (ref 46–116)
ALT SERPL W P-5'-P-CCNC: 27 U/L (ref 12–78)
ANION GAP SERPL CALCULATED.3IONS-SCNC: 6 MMOL/L (ref 4–13)
AST SERPL W P-5'-P-CCNC: 16 U/L (ref 5–45)
BILIRUB SERPL-MCNC: 0.72 MG/DL (ref 0.2–1)
BILIRUB UR QL STRIP: NEGATIVE
BUN SERPL-MCNC: 16 MG/DL (ref 5–25)
CALCIUM SERPL-MCNC: 9 MG/DL (ref 8.3–10.1)
CHLORIDE SERPL-SCNC: 103 MMOL/L (ref 100–108)
CHOLEST SERPL-MCNC: 188 MG/DL (ref 50–200)
CLARITY UR: CLEAR
CO2 SERPL-SCNC: 29 MMOL/L (ref 21–32)
COLOR UR: YELLOW
CREAT SERPL-MCNC: 1.05 MG/DL (ref 0.6–1.3)
ERYTHROCYTE [DISTWIDTH] IN BLOOD BY AUTOMATED COUNT: 11.9 % (ref 11.6–15.1)
GFR SERPL CREATININE-BSD FRML MDRD: 74 ML/MIN/1.73SQ M
GLUCOSE P FAST SERPL-MCNC: 85 MG/DL (ref 65–99)
GLUCOSE UR STRIP-MCNC: NEGATIVE MG/DL
HCT VFR BLD AUTO: 42.8 % (ref 36.5–49.3)
HDLC SERPL-MCNC: 69 MG/DL
HGB BLD-MCNC: 14.1 G/DL (ref 12–17)
HGB UR QL STRIP.AUTO: NEGATIVE
KETONES UR STRIP-MCNC: NEGATIVE MG/DL
LDLC SERPL CALC-MCNC: 95 MG/DL (ref 0–100)
LEUKOCYTE ESTERASE UR QL STRIP: NEGATIVE
MCH RBC QN AUTO: 29.9 PG (ref 26.8–34.3)
MCHC RBC AUTO-ENTMCNC: 32.9 G/DL (ref 31.4–37.4)
MCV RBC AUTO: 91 FL (ref 82–98)
NITRITE UR QL STRIP: NEGATIVE
NONHDLC SERPL-MCNC: 119 MG/DL
PH UR STRIP.AUTO: 7 [PH]
PLATELET # BLD AUTO: 255 THOUSANDS/UL (ref 149–390)
PMV BLD AUTO: 9.3 FL (ref 8.9–12.7)
POTASSIUM SERPL-SCNC: 3.8 MMOL/L (ref 3.5–5.3)
PROT SERPL-MCNC: 7.2 G/DL (ref 6.4–8.2)
PROT UR STRIP-MCNC: NEGATIVE MG/DL
PSA SERPL-MCNC: <0.1 NG/ML (ref 0–4)
RBC # BLD AUTO: 4.71 MILLION/UL (ref 3.88–5.62)
SODIUM SERPL-SCNC: 138 MMOL/L (ref 136–145)
SP GR UR STRIP.AUTO: 1 (ref 1–1.03)
TRIGL SERPL-MCNC: 122 MG/DL
TSH SERPL DL<=0.05 MIU/L-ACNC: 1.24 UIU/ML (ref 0.36–3.74)
UROBILINOGEN UR QL STRIP.AUTO: 0.2 E.U./DL
WBC # BLD AUTO: 5.05 THOUSAND/UL (ref 4.31–10.16)

## 2020-07-22 PROCEDURE — 84443 ASSAY THYROID STIM HORMONE: CPT

## 2020-07-22 PROCEDURE — 81003 URINALYSIS AUTO W/O SCOPE: CPT | Performed by: FAMILY MEDICINE

## 2020-07-22 PROCEDURE — 82306 VITAMIN D 25 HYDROXY: CPT

## 2020-07-22 PROCEDURE — 80053 COMPREHEN METABOLIC PANEL: CPT

## 2020-07-22 PROCEDURE — 36415 COLL VENOUS BLD VENIPUNCTURE: CPT

## 2020-07-22 PROCEDURE — 85027 COMPLETE CBC AUTOMATED: CPT

## 2020-07-22 PROCEDURE — 80061 LIPID PANEL: CPT

## 2020-07-22 PROCEDURE — G0103 PSA SCREENING: HCPCS

## 2020-08-21 ENCOUNTER — TELEPHONE (OUTPATIENT)
Dept: VASCULAR SURGERY | Facility: CLINIC | Age: 65
End: 2020-08-21

## 2020-08-24 ENCOUNTER — TRANSCRIBE ORDERS (OUTPATIENT)
Dept: ADMINISTRATIVE | Facility: HOSPITAL | Age: 65
End: 2020-08-24

## 2020-08-24 DIAGNOSIS — R14.1 FLATULENCE, ERUCTATION, AND GAS PAIN: Primary | ICD-10-CM

## 2020-08-24 DIAGNOSIS — B96.81 HELICOBACTER PYLORI GASTRITIS: ICD-10-CM

## 2020-08-24 DIAGNOSIS — K29.70 HELICOBACTER PYLORI GASTRITIS: ICD-10-CM

## 2020-08-24 DIAGNOSIS — R14.2 FLATULENCE, ERUCTATION, AND GAS PAIN: Primary | ICD-10-CM

## 2020-08-24 DIAGNOSIS — R14.3 FLATULENCE, ERUCTATION, AND GAS PAIN: Primary | ICD-10-CM

## 2020-08-26 ENCOUNTER — HOSPITAL ENCOUNTER (OUTPATIENT)
Dept: RADIOLOGY | Age: 65
Discharge: HOME | End: 2020-08-26
Payer: COMMERCIAL

## 2020-08-26 DIAGNOSIS — R14.2 ERUCTATION: ICD-10-CM

## 2020-08-26 DIAGNOSIS — R14.3 FLATULENCE, ERUCTATION, AND GAS PAIN: ICD-10-CM

## 2020-08-26 DIAGNOSIS — B96.81 HELICOBACTER PYLORI GASTRITIS: ICD-10-CM

## 2020-08-26 DIAGNOSIS — R14.1 FLATULENCE, ERUCTATION, AND GAS PAIN: ICD-10-CM

## 2020-08-26 DIAGNOSIS — R14.2 FLATULENCE, ERUCTATION, AND GAS PAIN: ICD-10-CM

## 2020-08-26 DIAGNOSIS — K29.70 HELICOBACTER PYLORI GASTRITIS: ICD-10-CM

## 2020-08-26 PROCEDURE — 76705 ECHO EXAM OF ABDOMEN: CPT

## 2020-08-29 DIAGNOSIS — I49.1 PAC (PREMATURE ATRIAL CONTRACTION): ICD-10-CM

## 2020-08-31 RX ORDER — FLECAINIDE ACETATE 50 MG/1
TABLET ORAL
Qty: 90 TABLET | Refills: 0 | Status: SHIPPED | OUTPATIENT
Start: 2020-08-31 | End: 2020-12-14

## 2020-09-21 PROBLEM — K80.10 CALCULUS OF GALLBLADDER WITH CHRONIC CHOLECYSTITIS WITHOUT OBSTRUCTION: Status: ACTIVE | Noted: 2020-09-21

## 2020-09-23 ENCOUNTER — LAB (OUTPATIENT)
Dept: LAB | Facility: MEDICAL CENTER | Age: 65
End: 2020-09-23
Payer: COMMERCIAL

## 2020-09-23 DIAGNOSIS — K80.10 CALCULUS OF GALLBLADDER WITH CHRONIC CHOLECYSTITIS WITHOUT OBSTRUCTION: ICD-10-CM

## 2020-09-23 LAB
ALBUMIN SERPL BCP-MCNC: 4.1 G/DL (ref 3.5–5)
ALP SERPL-CCNC: 55 U/L (ref 46–116)
ALT SERPL W P-5'-P-CCNC: 40 U/L (ref 12–78)
ANION GAP SERPL CALCULATED.3IONS-SCNC: 3 MMOL/L (ref 4–13)
AST SERPL W P-5'-P-CCNC: 17 U/L (ref 5–45)
BILIRUB SERPL-MCNC: 0.58 MG/DL (ref 0.2–1)
BUN SERPL-MCNC: 22 MG/DL (ref 5–25)
CALCIUM SERPL-MCNC: 9.7 MG/DL (ref 8.3–10.1)
CHLORIDE SERPL-SCNC: 104 MMOL/L (ref 100–108)
CO2 SERPL-SCNC: 32 MMOL/L (ref 21–32)
CREAT SERPL-MCNC: 1.38 MG/DL (ref 0.6–1.3)
ERYTHROCYTE [DISTWIDTH] IN BLOOD BY AUTOMATED COUNT: 12.1 % (ref 11.6–15.1)
GFR SERPL CREATININE-BSD FRML MDRD: 53 ML/MIN/1.73SQ M
GLUCOSE P FAST SERPL-MCNC: 148 MG/DL (ref 65–99)
HCT VFR BLD AUTO: 43.5 % (ref 36.5–49.3)
HGB BLD-MCNC: 13.9 G/DL (ref 12–17)
MCH RBC QN AUTO: 29.6 PG (ref 26.8–34.3)
MCHC RBC AUTO-ENTMCNC: 32 G/DL (ref 31.4–37.4)
MCV RBC AUTO: 93 FL (ref 82–98)
PLATELET # BLD AUTO: 264 THOUSANDS/UL (ref 149–390)
PMV BLD AUTO: 9.5 FL (ref 8.9–12.7)
POTASSIUM SERPL-SCNC: 4.2 MMOL/L (ref 3.5–5.3)
PROT SERPL-MCNC: 7.4 G/DL (ref 6.4–8.2)
RBC # BLD AUTO: 4.7 MILLION/UL (ref 3.88–5.62)
SODIUM SERPL-SCNC: 139 MMOL/L (ref 136–145)
WBC # BLD AUTO: 7.28 THOUSAND/UL (ref 4.31–10.16)

## 2020-09-23 PROCEDURE — 80053 COMPREHEN METABOLIC PANEL: CPT

## 2020-09-23 PROCEDURE — 36415 COLL VENOUS BLD VENIPUNCTURE: CPT

## 2020-09-23 PROCEDURE — 85027 COMPLETE CBC AUTOMATED: CPT

## 2020-09-25 ENCOUNTER — HOSPITAL ENCOUNTER (OUTPATIENT)
Dept: RADIOLOGY | Age: 65
Discharge: HOME | End: 2020-09-25
Payer: COMMERCIAL

## 2020-09-25 DIAGNOSIS — R10.9 ABDOMINAL PAIN: ICD-10-CM

## 2020-09-25 DIAGNOSIS — K80.10 CALCULUS OF GALLBLADDER WITH CHRONIC CHOLECYSTITIS WITHOUT OBSTRUCTION: ICD-10-CM

## 2020-09-25 PROCEDURE — 74176 CT ABD & PELVIS W/O CONTRAST: CPT

## 2020-09-25 PROCEDURE — G1004 CDSM NDSC: HCPCS

## 2020-09-30 ENCOUNTER — CLINICAL SUPPORT (OUTPATIENT)
Dept: URGENT CARE | Facility: MEDICAL CENTER | Age: 65
End: 2020-09-30
Payer: COMMERCIAL

## 2020-09-30 DIAGNOSIS — K80.10 CALCULUS OF GALLBLADDER WITH CHRONIC CHOLECYSTITIS WITHOUT OBSTRUCTION: ICD-10-CM

## 2020-09-30 PROCEDURE — 93005 ELECTROCARDIOGRAM TRACING: CPT

## 2020-10-01 LAB
ATRIAL RATE: 63 BPM
P AXIS: 43 DEGREES
PR INTERVAL: 174 MS
QRS AXIS: 41 DEGREES
QRSD INTERVAL: 94 MS
QT INTERVAL: 410 MS
QTC INTERVAL: 419 MS
T WAVE AXIS: 4 DEGREES
VENTRICULAR RATE: 63 BPM

## 2020-10-01 PROCEDURE — 93010 ELECTROCARDIOGRAM REPORT: CPT | Performed by: INTERNAL MEDICINE

## 2020-10-08 ENCOUNTER — ANESTHESIA EVENT (OUTPATIENT)
Dept: PERIOP | Facility: HOSPITAL | Age: 65
End: 2020-10-08
Payer: COMMERCIAL

## 2020-10-09 ENCOUNTER — HOSPITAL ENCOUNTER (OUTPATIENT)
Facility: HOSPITAL | Age: 65
Setting detail: OUTPATIENT SURGERY
Discharge: HOME | End: 2020-10-09
Attending: SURGERY | Admitting: SURGERY
Payer: COMMERCIAL

## 2020-10-09 ENCOUNTER — ANESTHESIA (OUTPATIENT)
Dept: PERIOP | Facility: HOSPITAL | Age: 65
End: 2020-10-09
Payer: COMMERCIAL

## 2020-10-09 VITALS
HEART RATE: 59 BPM | RESPIRATION RATE: 16 BRPM | OXYGEN SATURATION: 98 % | HEIGHT: 69 IN | DIASTOLIC BLOOD PRESSURE: 80 MMHG | BODY MASS INDEX: 25.92 KG/M2 | TEMPERATURE: 97.3 F | WEIGHT: 175 LBS | SYSTOLIC BLOOD PRESSURE: 132 MMHG

## 2020-10-09 VITALS — HEART RATE: 80 BPM

## 2020-10-09 DIAGNOSIS — K80.10 CALCULUS OF GALLBLADDER WITH CHRONIC CHOLECYSTITIS WITHOUT OBSTRUCTION: ICD-10-CM

## 2020-10-09 PROCEDURE — 88304 TISSUE EXAM BY PATHOLOGIST: CPT | Performed by: PATHOLOGY

## 2020-10-09 PROCEDURE — 47562 LAPAROSCOPIC CHOLECYSTECTOMY: CPT | Performed by: SURGERY

## 2020-10-09 PROCEDURE — 47562 LAPAROSCOPIC CHOLECYSTECTOMY: CPT | Performed by: PHYSICIAN ASSISTANT

## 2020-10-09 RX ORDER — MAGNESIUM HYDROXIDE 1200 MG/15ML
LIQUID ORAL AS NEEDED
Status: DISCONTINUED | OUTPATIENT
Start: 2020-10-09 | End: 2020-10-09 | Stop reason: HOSPADM

## 2020-10-09 RX ORDER — ROCURONIUM BROMIDE 10 MG/ML
INJECTION, SOLUTION INTRAVENOUS AS NEEDED
Status: DISCONTINUED | OUTPATIENT
Start: 2020-10-09 | End: 2020-10-09

## 2020-10-09 RX ORDER — BUPIVACAINE HYDROCHLORIDE 2.5 MG/ML
INJECTION, SOLUTION EPIDURAL; INFILTRATION; INTRACAUDAL AS NEEDED
Status: DISCONTINUED | OUTPATIENT
Start: 2020-10-09 | End: 2020-10-09 | Stop reason: HOSPADM

## 2020-10-09 RX ORDER — FENTANYL CITRATE 50 UG/ML
INJECTION, SOLUTION INTRAMUSCULAR; INTRAVENOUS AS NEEDED
Status: DISCONTINUED | OUTPATIENT
Start: 2020-10-09 | End: 2020-10-09

## 2020-10-09 RX ORDER — ONDANSETRON 2 MG/ML
4 INJECTION INTRAMUSCULAR; INTRAVENOUS ONCE AS NEEDED
Status: COMPLETED | OUTPATIENT
Start: 2020-10-09 | End: 2020-10-09

## 2020-10-09 RX ORDER — LIDOCAINE HYDROCHLORIDE 10 MG/ML
INJECTION, SOLUTION EPIDURAL; INFILTRATION; INTRACAUDAL; PERINEURAL AS NEEDED
Status: DISCONTINUED | OUTPATIENT
Start: 2020-10-09 | End: 2020-10-09

## 2020-10-09 RX ORDER — HYDROMORPHONE HCL/PF 1 MG/ML
SYRINGE (ML) INJECTION AS NEEDED
Status: DISCONTINUED | OUTPATIENT
Start: 2020-10-09 | End: 2020-10-09

## 2020-10-09 RX ORDER — OXYCODONE HYDROCHLORIDE AND ACETAMINOPHEN 5; 325 MG/1; MG/1
1 TABLET ORAL EVERY 4 HOURS PRN
Qty: 12 TABLET | Refills: 0 | Status: SHIPPED | OUTPATIENT
Start: 2020-10-09 | End: 2021-01-20 | Stop reason: ALTCHOICE

## 2020-10-09 RX ORDER — DEXAMETHASONE SODIUM PHOSPHATE 10 MG/ML
INJECTION, SOLUTION INTRAMUSCULAR; INTRAVENOUS AS NEEDED
Status: DISCONTINUED | OUTPATIENT
Start: 2020-10-09 | End: 2020-10-09

## 2020-10-09 RX ORDER — KETOROLAC TROMETHAMINE 30 MG/ML
INJECTION, SOLUTION INTRAMUSCULAR; INTRAVENOUS AS NEEDED
Status: DISCONTINUED | OUTPATIENT
Start: 2020-10-09 | End: 2020-10-09

## 2020-10-09 RX ORDER — SODIUM CHLORIDE, SODIUM LACTATE, POTASSIUM CHLORIDE, CALCIUM CHLORIDE 600; 310; 30; 20 MG/100ML; MG/100ML; MG/100ML; MG/100ML
INJECTION, SOLUTION INTRAVENOUS CONTINUOUS PRN
Status: DISCONTINUED | OUTPATIENT
Start: 2020-10-09 | End: 2020-10-09

## 2020-10-09 RX ORDER — EPHEDRINE SULFATE 50 MG/ML
INJECTION INTRAVENOUS AS NEEDED
Status: DISCONTINUED | OUTPATIENT
Start: 2020-10-09 | End: 2020-10-09

## 2020-10-09 RX ORDER — PROPOFOL 10 MG/ML
INJECTION, EMULSION INTRAVENOUS AS NEEDED
Status: DISCONTINUED | OUTPATIENT
Start: 2020-10-09 | End: 2020-10-09

## 2020-10-09 RX ORDER — ONDANSETRON 2 MG/ML
INJECTION INTRAMUSCULAR; INTRAVENOUS AS NEEDED
Status: DISCONTINUED | OUTPATIENT
Start: 2020-10-09 | End: 2020-10-09

## 2020-10-09 RX ORDER — FENTANYL CITRATE/PF 50 MCG/ML
25 SYRINGE (ML) INJECTION
Status: DISCONTINUED | OUTPATIENT
Start: 2020-10-09 | End: 2020-10-09 | Stop reason: HOSPADM

## 2020-10-09 RX ORDER — GLYCOPYRROLATE 0.2 MG/ML
INJECTION INTRAMUSCULAR; INTRAVENOUS AS NEEDED
Status: DISCONTINUED | OUTPATIENT
Start: 2020-10-09 | End: 2020-10-09

## 2020-10-09 RX ORDER — SODIUM CHLORIDE 9 MG/ML
INJECTION, SOLUTION INTRAVENOUS AS NEEDED
Status: DISCONTINUED | OUTPATIENT
Start: 2020-10-09 | End: 2020-10-09 | Stop reason: HOSPADM

## 2020-10-09 RX ORDER — OXYCODONE HYDROCHLORIDE AND ACETAMINOPHEN 5; 325 MG/1; MG/1
1 TABLET ORAL EVERY 4 HOURS PRN
Status: DISCONTINUED | OUTPATIENT
Start: 2020-10-09 | End: 2020-10-09 | Stop reason: HOSPADM

## 2020-10-09 RX ORDER — HYDROMORPHONE HCL/PF 1 MG/ML
0.2 SYRINGE (ML) INJECTION
Status: DISCONTINUED | OUTPATIENT
Start: 2020-10-09 | End: 2020-10-09 | Stop reason: HOSPADM

## 2020-10-09 RX ORDER — HYDROMORPHONE HCL/PF 1 MG/ML
0.5 SYRINGE (ML) INJECTION
Status: DISCONTINUED | OUTPATIENT
Start: 2020-10-09 | End: 2020-10-09 | Stop reason: HOSPADM

## 2020-10-09 RX ORDER — ONDANSETRON 2 MG/ML
4 INJECTION INTRAMUSCULAR; INTRAVENOUS EVERY 4 HOURS PRN
Status: DISCONTINUED | OUTPATIENT
Start: 2020-10-09 | End: 2020-10-09 | Stop reason: HOSPADM

## 2020-10-09 RX ORDER — NEOSTIGMINE METHYLSULFATE 1 MG/ML
INJECTION INTRAVENOUS AS NEEDED
Status: DISCONTINUED | OUTPATIENT
Start: 2020-10-09 | End: 2020-10-09

## 2020-10-09 RX ORDER — LIDOCAINE HYDROCHLORIDE 10 MG/ML
0.5 INJECTION, SOLUTION EPIDURAL; INFILTRATION; INTRACAUDAL; PERINEURAL ONCE AS NEEDED
Status: DISCONTINUED | OUTPATIENT
Start: 2020-10-09 | End: 2020-10-09 | Stop reason: HOSPADM

## 2020-10-09 RX ORDER — MIDAZOLAM HYDROCHLORIDE 2 MG/2ML
INJECTION, SOLUTION INTRAMUSCULAR; INTRAVENOUS AS NEEDED
Status: DISCONTINUED | OUTPATIENT
Start: 2020-10-09 | End: 2020-10-09

## 2020-10-09 RX ORDER — CEFAZOLIN SODIUM 1 G/50ML
1000 SOLUTION INTRAVENOUS ONCE
Status: COMPLETED | OUTPATIENT
Start: 2020-10-09 | End: 2020-10-09

## 2020-10-09 RX ORDER — ACETAMINOPHEN 325 MG/1
650 TABLET ORAL EVERY 4 HOURS PRN
Status: DISCONTINUED | OUTPATIENT
Start: 2020-10-09 | End: 2020-10-09 | Stop reason: HOSPADM

## 2020-10-09 RX ADMIN — FENTANYL CITRATE 100 MCG: 50 INJECTION INTRAMUSCULAR; INTRAVENOUS at 07:39

## 2020-10-09 RX ADMIN — CEFAZOLIN SODIUM 1000 MG: 1 SOLUTION INTRAVENOUS at 07:45

## 2020-10-09 RX ADMIN — ROCURONIUM BROMIDE 40 MG: 10 SOLUTION INTRAVENOUS at 07:41

## 2020-10-09 RX ADMIN — GLYCOPYRROLATE 0.4 MG: 0.2 INJECTION, SOLUTION INTRAMUSCULAR; INTRAVENOUS at 08:45

## 2020-10-09 RX ADMIN — LIDOCAINE HYDROCHLORIDE 50 MG: 10 INJECTION, SOLUTION EPIDURAL; INFILTRATION; INTRACAUDAL at 07:38

## 2020-10-09 RX ADMIN — SODIUM CHLORIDE, SODIUM LACTATE, POTASSIUM CHLORIDE, AND CALCIUM CHLORIDE: .6; .31; .03; .02 INJECTION, SOLUTION INTRAVENOUS at 07:35

## 2020-10-09 RX ADMIN — DEXAMETHASONE SODIUM PHOSPHATE 4 MG: 10 INJECTION, SOLUTION INTRAMUSCULAR; INTRAVENOUS at 07:51

## 2020-10-09 RX ADMIN — KETOROLAC TROMETHAMINE 15 MG: 30 INJECTION, SOLUTION INTRAMUSCULAR at 07:57

## 2020-10-09 RX ADMIN — ONDANSETRON 4 MG: 2 INJECTION INTRAMUSCULAR; INTRAVENOUS at 07:51

## 2020-10-09 RX ADMIN — PROPOFOL 50 MG: 10 INJECTION, EMULSION INTRAVENOUS at 07:40

## 2020-10-09 RX ADMIN — MIDAZOLAM HYDROCHLORIDE 2 MG: 1 INJECTION, SOLUTION INTRAMUSCULAR; INTRAVENOUS at 07:35

## 2020-10-09 RX ADMIN — ONDANSETRON 4 MG: 2 INJECTION INTRAMUSCULAR; INTRAVENOUS at 09:31

## 2020-10-09 RX ADMIN — ROCURONIUM BROMIDE 10 MG: 10 SOLUTION INTRAVENOUS at 07:56

## 2020-10-09 RX ADMIN — HYDROMORPHONE HYDROCHLORIDE 0.5 MG: 1 INJECTION, SOLUTION INTRAMUSCULAR; INTRAVENOUS; SUBCUTANEOUS at 07:56

## 2020-10-09 RX ADMIN — PROPOFOL 100 MG: 10 INJECTION, EMULSION INTRAVENOUS at 07:39

## 2020-10-09 RX ADMIN — EPHEDRINE SULFATE 5 MG: 50 INJECTION, SOLUTION INTRAVENOUS at 08:00

## 2020-10-09 RX ADMIN — NEOSTIGMINE METHYLSULFATE 3 MG: 1 INJECTION INTRAVENOUS at 08:45

## 2020-10-23 ENCOUNTER — LAB (OUTPATIENT)
Dept: LAB | Facility: HOSPITAL | Age: 65
End: 2020-10-23
Attending: INTERNAL MEDICINE
Payer: COMMERCIAL

## 2020-10-23 ENCOUNTER — TRANSCRIBE ORDERS (OUTPATIENT)
Dept: LAB | Facility: HOSPITAL | Age: 65
End: 2020-10-23

## 2020-10-23 DIAGNOSIS — R14.3 FLATULENCE, ERUCTATION, AND GAS PAIN: Primary | ICD-10-CM

## 2020-10-23 DIAGNOSIS — B96.81 ACUTE GASTRIC ULCER DUE TO HELICOBACTER PYLORI: Primary | ICD-10-CM

## 2020-10-23 DIAGNOSIS — K25.3 ACUTE GASTRIC ULCER DUE TO HELICOBACTER PYLORI: Primary | ICD-10-CM

## 2020-10-23 DIAGNOSIS — B96.81 ACUTE GASTRIC ULCER DUE TO HELICOBACTER PYLORI: ICD-10-CM

## 2020-10-23 DIAGNOSIS — R14.1 FLATULENCE, ERUCTATION, AND GAS PAIN: Primary | ICD-10-CM

## 2020-10-23 DIAGNOSIS — K25.3 ACUTE GASTRIC ULCER DUE TO HELICOBACTER PYLORI: ICD-10-CM

## 2020-10-23 DIAGNOSIS — R14.1 FLATULENCE, ERUCTATION, AND GAS PAIN: ICD-10-CM

## 2020-10-23 DIAGNOSIS — R14.3 FLATULENCE, ERUCTATION, AND GAS PAIN: ICD-10-CM

## 2020-10-23 DIAGNOSIS — R14.2 FLATULENCE, ERUCTATION, AND GAS PAIN: Primary | ICD-10-CM

## 2020-10-23 DIAGNOSIS — R14.2 FLATULENCE, ERUCTATION, AND GAS PAIN: ICD-10-CM

## 2020-10-23 PROCEDURE — 36415 COLL VENOUS BLD VENIPUNCTURE: CPT

## 2020-10-23 PROCEDURE — 87046 STOOL CULTR AEROBIC BACT EA: CPT

## 2020-10-23 PROCEDURE — 87045 FECES CULTURE AEROBIC BACT: CPT

## 2020-10-23 PROCEDURE — 87338 HPYLORI STOOL AG IA: CPT

## 2020-10-23 PROCEDURE — 87427 SHIGA-LIKE TOXIN AG IA: CPT

## 2020-10-25 LAB — H PYLORI AG STL QL IA: NEGATIVE

## 2020-10-26 ENCOUNTER — TELEMEDICINE (OUTPATIENT)
Dept: SURGERY | Facility: CLINIC | Age: 65
End: 2020-10-26

## 2020-10-26 DIAGNOSIS — K80.10 CALCULUS OF GALLBLADDER WITH CHRONIC CHOLECYSTITIS WITHOUT OBSTRUCTION: Primary | ICD-10-CM

## 2020-10-26 PROCEDURE — 99024 POSTOP FOLLOW-UP VISIT: CPT | Performed by: SURGERY

## 2020-11-02 LAB — MISCELLANEOUS LAB TEST RESULT: NORMAL

## 2020-11-03 ENCOUNTER — TREATMENT (OUTPATIENT)
Dept: SURGERY | Facility: CLINIC | Age: 65
End: 2020-11-03

## 2020-11-03 DIAGNOSIS — R11.0 NAUSEA: Primary | ICD-10-CM

## 2020-11-03 RX ORDER — SUCRALFATE 1 G/1
1 TABLET ORAL 4 TIMES DAILY
Qty: 60 TABLET | Refills: 1 | Status: SHIPPED | OUTPATIENT
Start: 2020-11-03 | End: 2020-12-03

## 2020-11-23 ENCOUNTER — OFFICE VISIT (OUTPATIENT)
Dept: GASTROENTEROLOGY | Facility: CLINIC | Age: 65
End: 2020-11-23
Payer: COMMERCIAL

## 2020-11-23 VITALS
DIASTOLIC BLOOD PRESSURE: 80 MMHG | WEIGHT: 181 LBS | TEMPERATURE: 97.1 F | BODY MASS INDEX: 26.81 KG/M2 | HEIGHT: 69 IN | SYSTOLIC BLOOD PRESSURE: 138 MMHG

## 2020-11-23 DIAGNOSIS — Z80.0 FAMILY HISTORY OF COLON CANCER: ICD-10-CM

## 2020-11-23 DIAGNOSIS — R14.2 BURPING: ICD-10-CM

## 2020-11-23 DIAGNOSIS — R11.0 NAUSEA: Primary | ICD-10-CM

## 2020-11-23 DIAGNOSIS — Z86.010 HISTORY OF COLON POLYPS: ICD-10-CM

## 2020-11-23 PROBLEM — Z86.0100 HISTORY OF COLON POLYPS: Status: ACTIVE | Noted: 2020-11-23

## 2020-11-23 PROCEDURE — 1036F TOBACCO NON-USER: CPT | Performed by: INTERNAL MEDICINE

## 2020-11-23 PROCEDURE — 3008F BODY MASS INDEX DOCD: CPT | Performed by: INTERNAL MEDICINE

## 2020-11-23 PROCEDURE — 99244 OFF/OP CNSLTJ NEW/EST MOD 40: CPT | Performed by: INTERNAL MEDICINE

## 2020-11-23 PROCEDURE — 1160F RVW MEDS BY RX/DR IN RCRD: CPT | Performed by: INTERNAL MEDICINE

## 2020-11-30 ENCOUNTER — LAB (OUTPATIENT)
Dept: LAB | Facility: HOSPITAL | Age: 65
End: 2020-11-30
Attending: INTERNAL MEDICINE
Payer: COMMERCIAL

## 2020-11-30 DIAGNOSIS — R14.2 BURPING: ICD-10-CM

## 2020-11-30 DIAGNOSIS — R11.0 NAUSEA: ICD-10-CM

## 2020-11-30 LAB
ALBUMIN SERPL BCP-MCNC: 3.9 G/DL (ref 3.5–5)
ALP SERPL-CCNC: 67 U/L (ref 46–116)
ALT SERPL W P-5'-P-CCNC: 29 U/L (ref 12–78)
ANION GAP SERPL CALCULATED.3IONS-SCNC: 3 MMOL/L (ref 4–13)
AST SERPL W P-5'-P-CCNC: 22 U/L (ref 5–45)
BILIRUB SERPL-MCNC: 0.62 MG/DL (ref 0.2–1)
BUN SERPL-MCNC: 17 MG/DL (ref 5–25)
CALCIUM SERPL-MCNC: 8.9 MG/DL (ref 8.3–10.1)
CHLORIDE SERPL-SCNC: 106 MMOL/L (ref 100–108)
CO2 SERPL-SCNC: 30 MMOL/L (ref 21–32)
CREAT SERPL-MCNC: 1.21 MG/DL (ref 0.6–1.3)
GFR SERPL CREATININE-BSD FRML MDRD: 62 ML/MIN/1.73SQ M
GLUCOSE P FAST SERPL-MCNC: 95 MG/DL (ref 65–99)
LIPASE SERPL-CCNC: 146 U/L (ref 73–393)
POTASSIUM SERPL-SCNC: 4.1 MMOL/L (ref 3.5–5.3)
PROT SERPL-MCNC: 7.1 G/DL (ref 6.4–8.2)
SODIUM SERPL-SCNC: 139 MMOL/L (ref 136–145)

## 2020-11-30 PROCEDURE — 80053 COMPREHEN METABOLIC PANEL: CPT

## 2020-11-30 PROCEDURE — 36415 COLL VENOUS BLD VENIPUNCTURE: CPT

## 2020-11-30 PROCEDURE — 83690 ASSAY OF LIPASE: CPT

## 2020-12-01 ENCOUNTER — TELEPHONE (OUTPATIENT)
Dept: GASTROENTEROLOGY | Facility: CLINIC | Age: 65
End: 2020-12-01

## 2020-12-03 DIAGNOSIS — R11.0 NAUSEA: ICD-10-CM

## 2020-12-03 RX ORDER — SUCRALFATE 1 G/1
TABLET ORAL
Qty: 60 TABLET | Refills: 1 | Status: SHIPPED | OUTPATIENT
Start: 2020-12-03 | End: 2021-01-20 | Stop reason: ALTCHOICE

## 2020-12-11 ENCOUNTER — HOSPITAL ENCOUNTER (OUTPATIENT)
Dept: NUCLEAR MEDICINE | Facility: HOSPITAL | Age: 65
Discharge: HOME | End: 2020-12-11
Payer: COMMERCIAL

## 2020-12-11 DIAGNOSIS — R11.0 NAUSEA: Primary | ICD-10-CM

## 2020-12-11 DIAGNOSIS — R14.2 BURPING: ICD-10-CM

## 2020-12-11 DIAGNOSIS — R11.0 NAUSEA: ICD-10-CM

## 2020-12-11 PROCEDURE — 78264 GASTRIC EMPTYING IMG STUDY: CPT

## 2020-12-11 PROCEDURE — A9541 TC99M SULFUR COLLOID: HCPCS

## 2020-12-11 PROCEDURE — G1004 CDSM NDSC: HCPCS

## 2020-12-13 DIAGNOSIS — I49.1 PAC (PREMATURE ATRIAL CONTRACTION): ICD-10-CM

## 2020-12-14 ENCOUNTER — TRANSCRIBE ORDERS (OUTPATIENT)
Dept: ADMINISTRATIVE | Facility: HOSPITAL | Age: 65
End: 2020-12-14

## 2020-12-14 ENCOUNTER — TELEPHONE (OUTPATIENT)
Dept: FAMILY MEDICINE CLINIC | Facility: CLINIC | Age: 65
End: 2020-12-14

## 2020-12-14 DIAGNOSIS — K30 FUNCTIONAL DYSPEPSIA: ICD-10-CM

## 2020-12-14 DIAGNOSIS — Z03.818 ENCOUNTER FOR OBSERVATION FOR SUSPECTED EXPOSURE TO OTHER BIOLOGICAL AGENTS RULED OUT: Primary | ICD-10-CM

## 2020-12-14 DIAGNOSIS — R11.0 NAUSEA: Primary | ICD-10-CM

## 2020-12-14 DIAGNOSIS — Z03.818 ENCOUNTER FOR OBSERVATION FOR SUSPECTED EXPOSURE TO OTHER BIOLOGICAL AGENTS RULED OUT: ICD-10-CM

## 2020-12-14 PROCEDURE — U0003 INFECTIOUS AGENT DETECTION BY NUCLEIC ACID (DNA OR RNA); SEVERE ACUTE RESPIRATORY SYNDROME CORONAVIRUS 2 (SARS-COV-2) (CORONAVIRUS DISEASE [COVID-19]), AMPLIFIED PROBE TECHNIQUE, MAKING USE OF HIGH THROUGHPUT TECHNOLOGIES AS DESCRIBED BY CMS-2020-01-R: HCPCS | Performed by: NURSE PRACTITIONER

## 2020-12-14 RX ORDER — AMITRIPTYLINE HYDROCHLORIDE 10 MG/1
10 TABLET, FILM COATED ORAL
Qty: 30 TABLET | Refills: 5 | Status: SHIPPED | OUTPATIENT
Start: 2020-12-14 | End: 2021-03-03 | Stop reason: HOSPADM

## 2020-12-14 RX ORDER — FLECAINIDE ACETATE 50 MG/1
TABLET ORAL
Qty: 90 TABLET | Refills: 0 | Status: SHIPPED | OUTPATIENT
Start: 2020-12-14 | End: 2021-01-20

## 2020-12-15 ENCOUNTER — TELEPHONE (OUTPATIENT)
Dept: FAMILY MEDICINE CLINIC | Facility: CLINIC | Age: 65
End: 2020-12-15

## 2020-12-15 LAB — SARS-COV-2 RNA SPEC QL NAA+PROBE: NOT DETECTED

## 2020-12-18 ENCOUNTER — IMMUNIZATIONS (OUTPATIENT)
Dept: FAMILY MEDICINE CLINIC | Facility: HOSPITAL | Age: 65
End: 2020-12-18
Payer: COMMERCIAL

## 2020-12-18 DIAGNOSIS — Z23 ENCOUNTER FOR IMMUNIZATION: ICD-10-CM

## 2020-12-18 PROCEDURE — 91300 SARS-COV-2 / COVID-19 MRNA VACCINE (PFIZER-BIONTECH) 30 MCG: CPT

## 2020-12-18 PROCEDURE — 0001A SARS-COV-2 / COVID-19 MRNA VACCINE (PFIZER-BIONTECH) 30 MCG: CPT

## 2020-12-21 ENCOUNTER — TELEPHONE (OUTPATIENT)
Dept: FAMILY MEDICINE CLINIC | Facility: CLINIC | Age: 65
End: 2020-12-21

## 2020-12-21 DIAGNOSIS — Z20.822 CLOSE EXPOSURE TO COVID-19 VIRUS: Primary | ICD-10-CM

## 2021-01-07 ENCOUNTER — LAB (OUTPATIENT)
Dept: LAB | Facility: HOSPITAL | Age: 66
End: 2021-01-07
Attending: INTERNAL MEDICINE
Payer: COMMERCIAL

## 2021-01-07 ENCOUNTER — IMMUNIZATIONS (OUTPATIENT)
Dept: FAMILY MEDICINE CLINIC | Facility: HOSPITAL | Age: 66
End: 2021-01-07

## 2021-01-07 DIAGNOSIS — R11.0 NAUSEA: ICD-10-CM

## 2021-01-07 DIAGNOSIS — R14.2 BURPING: ICD-10-CM

## 2021-01-07 DIAGNOSIS — Z23 ENCOUNTER FOR IMMUNIZATION: ICD-10-CM

## 2021-01-07 DIAGNOSIS — K21.00 GASTROESOPHAGEAL REFLUX DISEASE WITH ESOPHAGITIS WITHOUT HEMORRHAGE: Primary | ICD-10-CM

## 2021-01-07 PROCEDURE — 0002A SARS-COV-2 / COVID-19 MRNA VACCINE (PFIZER-BIONTECH) 30 MCG: CPT

## 2021-01-07 PROCEDURE — 91300 SARS-COV-2 / COVID-19 MRNA VACCINE (PFIZER-BIONTECH) 30 MCG: CPT

## 2021-01-07 PROCEDURE — 87338 HPYLORI STOOL AG IA: CPT

## 2021-01-07 RX ORDER — PANTOPRAZOLE SODIUM 40 MG/1
TABLET, DELAYED RELEASE ORAL
Qty: 30 TABLET | Refills: 0 | Status: SHIPPED | OUTPATIENT
Start: 2021-01-07 | End: 2021-02-01

## 2021-01-08 LAB — H PYLORI AG STL QL IA: NEGATIVE

## 2021-01-20 ENCOUNTER — OFFICE VISIT (OUTPATIENT)
Dept: CARDIOLOGY CLINIC | Facility: CLINIC | Age: 66
End: 2021-01-20
Payer: COMMERCIAL

## 2021-01-20 VITALS
BODY MASS INDEX: 27.53 KG/M2 | HEART RATE: 63 BPM | SYSTOLIC BLOOD PRESSURE: 118 MMHG | WEIGHT: 185.9 LBS | DIASTOLIC BLOOD PRESSURE: 70 MMHG | HEIGHT: 69 IN

## 2021-01-20 DIAGNOSIS — I49.1 PAC (PREMATURE ATRIAL CONTRACTION): Primary | ICD-10-CM

## 2021-01-20 DIAGNOSIS — Z82.49 FAMILY HISTORY OF CORONARY ARTERY DISEASE: ICD-10-CM

## 2021-01-20 DIAGNOSIS — R94.31 ABNORMAL EKG: ICD-10-CM

## 2021-01-20 DIAGNOSIS — E78.00 PURE HYPERCHOLESTEROLEMIA: ICD-10-CM

## 2021-01-20 PROCEDURE — 1036F TOBACCO NON-USER: CPT | Performed by: INTERNAL MEDICINE

## 2021-01-20 PROCEDURE — 1160F RVW MEDS BY RX/DR IN RCRD: CPT | Performed by: INTERNAL MEDICINE

## 2021-01-20 PROCEDURE — 99213 OFFICE O/P EST LOW 20 MIN: CPT | Performed by: INTERNAL MEDICINE

## 2021-01-20 PROCEDURE — 93000 ELECTROCARDIOGRAM COMPLETE: CPT | Performed by: INTERNAL MEDICINE

## 2021-01-20 PROCEDURE — 3008F BODY MASS INDEX DOCD: CPT | Performed by: INTERNAL MEDICINE

## 2021-01-20 RX ORDER — IRON HEME POLYPEPTIDE/FOLIC AC 12-1MG
5000 TABLET ORAL DAILY
COMMUNITY

## 2021-01-20 RX ORDER — FLECAINIDE ACETATE 50 MG/1
50 TABLET ORAL 2 TIMES DAILY
Qty: 180 TABLET | Refills: 3 | Status: SHIPPED | OUTPATIENT
Start: 2021-01-20 | End: 2022-01-24

## 2021-01-20 NOTE — PROGRESS NOTES
EPS Progress Note - Jose Galaviz 77 y o  male MRN: [id number]           ASSESSMENT:  1  PAC (premature atrial contraction)  POCT ECG    flecainide (TAMBOCOR) 50 mg tablet   2  Family history of coronary artery disease             PLAN:  1  PACs continue flecainide 50 mg daily increased to twice daily p r n  Given continued use of flecainide   will order exercise stress echo as he has T-wave inversions and a plain exercise stress test would not be diagnostic  He exercises at a high level and given that he is taking flecainide should have current stress test it has been many many years since he has had one    2  Family history of coronary artery disease continue statin at current dose consider CT coronary calcium score or angiogram in the future    Dr Gretchen Chan EKG shows T-wave inversion similar to prior    HPI:   Jose Galaviz is a 77 y o  male who presents to the office today for a one year follow up  He continues to do remarkably well 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  He has a strong family history of coronary disease therefore he keeps himself in great shape he had some premature atrial contractions which abated on flecainide he is now taking flecainide 50 mg daily he is reluctant to stop because he feels so well        ROS:  Very rare palpitations all other 12 point ROS negative       Objective:     Vitals: Blood pressure 118/70, pulse 63, height 5' 9" (1 753 m), weight 84 3 kg (185 lb 14 4 oz)  , Body mass index is 27 45 kg/m²  ,        Physical Exam:    GEN: Jose Galaviz appears well, alert and oriented x 3, pleasant and cooperative   HEENT: pupils equal, round, and reactive to light; extraocular muscles intact  NECK: supple, no carotid bruits   HEART: regular rhythm, normal S1 and S2, no murmurs, clicks, gallops or rubs   LUNGS: clear to auscultation bilaterally; no wheezes, rales, or rhonchi   ABDOMEN: normal bowel sounds, soft, no tenderness, no distention  EXTREMITIES: peripheral pulses normal; no clubbing, cyanosis, or edema  NEURO: no focal findings   SKIN: normal without suspicious lesions on exposed skin    Medications:      Current Outpatient Medications:     amitriptyline (ELAVIL) 10 mg tablet, Take 1 tablet (10 mg total) by mouth daily at bedtime, Disp: 30 tablet, Rfl: 5    aspirin 81 mg chewable tablet, Chew, Disp: , Rfl:     Cholecalciferol (Dialyvite Vitamin D 5000) 125 MCG (5000 UT) capsule, Take 5,000 Units by mouth daily, Disp: , Rfl:     pantoprazole (PROTONIX) 40 mg tablet, TAKE 1 TABLET BY MOUTH TWICE A DAY FOR 14 DAYS, Disp: 30 tablet, Rfl: 0    rosuvastatin (CRESTOR) 5 mg tablet, Take by mouth 1 tab 3 days per week, Disp: , Rfl:     flecainide (TAMBOCOR) 50 mg tablet, Take 1 tablet (50 mg total) by mouth 2 (two) times a day, Disp: 180 tablet, Rfl: 3     Family History   Problem Relation Age of Onset    Lymphoma Mother     Lung cancer Father     Heart disease Brother     Other Brother         multisystem atrophy     Social History     Socioeconomic History    Marital status: /Civil Union     Spouse name: Not on file    Number of children: Not on file    Years of education: Not on file    Highest education level: Not on file   Occupational History    Not on file   Social Needs    Financial resource strain: Not on file    Food insecurity     Worry: Not on file     Inability: Not on file   [employer] needs     Medical: Not on file     Non-medical: Not on file   Tobacco Use    Smoking status: Never Smoker    Smokeless tobacco: Never Used   Substance and Sexual Activity    Alcohol use:  Yes     Alcohol/week: 1 0 standard drinks     Types: 1 Glasses of wine per week     Frequency: 2-4 times a month     Binge frequency: Weekly    Drug use: Never    Sexual activity: Not on file   Lifestyle    Physical activity     Days per week: Not on file     Minutes per session: Not on file    Stress: Not on file   Relationships    Social connections     Talks on phone: Not on file     Gets together: Not on file     Attends [employer] service: Not on file     Active member of club or organization: Not on file     Attends meetings of clubs or organizations: Not on file     Relationship status: Not on file    Intimate partner violence     Fear of current or ex partner: Not on file     Emotionally abused: Not on file     Physically abused: Not on file     Forced sexual activity: Not on file   Other Topics Concern    Not on file   Social History Narrative    Do you currently or have you served in the [employer]:   No      Occupation:   Vascular Surgeon      Advance directive:   Yes      Social History     Tobacco Use   Smoking Status Never Smoker   Smokeless Tobacco Never Used     Social History     Substance and Sexual Activity   Alcohol Use Yes    Alcohol/week: 1 0 standard drinks    Types: 1 Glasses of wine per week    Frequency: 2-4 times a month    Binge frequency: Weekly       Labs & Results:  Below is the patient's most recent value for Albumin, ALT, AST, BUN, Calcium, Chloride, Cholesterol, CO2, Creatinine, GFR, Glucose, HDL, Hematocrit, Hemoglobin, Hemoglobin A1C, LDL, Magnesium, Phosphorus, Platelets, Potassium, PSA, Sodium, Triglycerides, and WBC  Lab Results   Component Value Date    ALT 29 11/30/2020    AST 22 11/30/2020    BUN 17 11/30/2020    CALCIUM 8 9 11/30/2020     11/30/2020    CHOL 199 06/11/2015    CO2 30 11/30/2020    CREATININE 1 21 11/30/2020    HDL 69 07/22/2020    HCT 43 5 09/23/2020    HGB 13 9 09/23/2020    HGBA1C 5 6 08/01/2019     09/23/2020    K 4 1 11/30/2020    PSA <0 1 07/22/2020     06/11/2015    TRIG 122 07/22/2020    WBC 7 28 09/23/2020     Note: for a comprehensive list of the patient's lab results, access the Results Review activity  Cardiac testing:   No results found for this or any previous visit  No results found for this or any previous visit    No results found for this or any previous visit  No results found for this or any previous visit

## 2021-01-27 PROBLEM — Z82.49 FAMILY HISTORY OF CORONARY ARTERY DISEASE: Status: ACTIVE | Noted: 2021-01-27

## 2021-01-27 PROBLEM — E78.00 PURE HYPERCHOLESTEROLEMIA: Status: ACTIVE | Noted: 2021-01-27

## 2021-01-28 DIAGNOSIS — K30 FUNCTIONAL DYSPEPSIA: Primary | ICD-10-CM

## 2021-01-28 RX ORDER — OXAZEPAM 10 MG
10 CAPSULE ORAL
Qty: 30 CAPSULE | Refills: 5 | Status: SHIPPED | OUTPATIENT
Start: 2021-01-28 | End: 2021-04-20 | Stop reason: SDUPTHER

## 2021-01-31 DIAGNOSIS — K21.00 GASTROESOPHAGEAL REFLUX DISEASE WITH ESOPHAGITIS WITHOUT HEMORRHAGE: ICD-10-CM

## 2021-02-01 RX ORDER — PANTOPRAZOLE SODIUM 40 MG/1
TABLET, DELAYED RELEASE ORAL
Qty: 30 TABLET | Refills: 0 | Status: SHIPPED | OUTPATIENT
Start: 2021-02-01 | End: 2021-02-17

## 2021-02-17 ENCOUNTER — TELEPHONE (OUTPATIENT)
Dept: NON INVASIVE DIAGNOSTICS | Facility: HOSPITAL | Age: 66
End: 2021-02-17

## 2021-02-17 DIAGNOSIS — K21.00 GASTROESOPHAGEAL REFLUX DISEASE WITH ESOPHAGITIS WITHOUT HEMORRHAGE: ICD-10-CM

## 2021-02-17 RX ORDER — PANTOPRAZOLE SODIUM 40 MG/1
TABLET, DELAYED RELEASE ORAL
Qty: 90 TABLET | Refills: 1 | Status: SHIPPED | OUTPATIENT
Start: 2021-02-17 | End: 2021-02-18

## 2021-02-18 ENCOUNTER — TELEPHONE (OUTPATIENT)
Dept: GASTROENTEROLOGY | Facility: CLINIC | Age: 66
End: 2021-02-18

## 2021-02-18 RX ORDER — PANTOPRAZOLE SODIUM 40 MG/1
TABLET, DELAYED RELEASE ORAL
Qty: 90 TABLET | Refills: 1 | Status: SHIPPED | OUTPATIENT
Start: 2021-02-18 | End: 2021-10-12

## 2021-02-18 NOTE — TELEPHONE ENCOUNTER
[facility] left message that a prior auth is needed for pantoprazole 40 mg 2x a day  Please obtain   Thank you

## 2021-02-22 ENCOUNTER — TELEPHONE (OUTPATIENT)
Dept: GASTROENTEROLOGY | Facility: CLINIC | Age: 66
End: 2021-02-22

## 2021-02-23 ENCOUNTER — PREP FOR PROCEDURE (OUTPATIENT)
Dept: GASTROENTEROLOGY | Facility: CLINIC | Age: 66
End: 2021-02-23

## 2021-02-23 ENCOUNTER — TELEPHONE (OUTPATIENT)
Dept: GASTROENTEROLOGY | Facility: CLINIC | Age: 66
End: 2021-02-23

## 2021-02-23 DIAGNOSIS — K21.00 GASTROESOPHAGEAL REFLUX DISEASE WITH ESOPHAGITIS WITHOUT HEMORRHAGE: Primary | ICD-10-CM

## 2021-02-23 NOTE — TELEPHONE ENCOUNTER
----- Message from Dior Zaldivar MD sent at 2/23/2021 11:12 AM EST -----  Regarding: EGD at [facility] [place] at 730am?  Reinaldo Hidalgo and oJrdyn Nagel,    Can I do an EGD for Dr Nessa Nina in the [facility] lab at 1am on 3/3? That is the day that would work best for him and I will be in fellows clinic from 8-4      [facility]

## 2021-02-23 NOTE — TELEPHONE ENCOUNTER
LVM to contact the office regarding [facility]30[facility]

## 2021-02-24 NOTE — TELEPHONE ENCOUNTER
Pt confirmed procedure on 3/3/21  Instructions emailed to [facility] and the [facility]  [email or web address]  [email or web address]

## 2021-03-01 ENCOUNTER — APPOINTMENT (OUTPATIENT)
Dept: RADIOLOGY | Facility: OTHER | Age: 66
End: 2021-03-01
Payer: COMMERCIAL

## 2021-03-01 ENCOUNTER — OFFICE VISIT (OUTPATIENT)
Dept: OBGYN CLINIC | Facility: OTHER | Age: 66
End: 2021-03-01
Payer: COMMERCIAL

## 2021-03-01 VITALS
HEART RATE: 61 BPM | DIASTOLIC BLOOD PRESSURE: 84 MMHG | BODY MASS INDEX: 27.4 KG/M2 | WEIGHT: 185 LBS | HEIGHT: 69 IN | SYSTOLIC BLOOD PRESSURE: 151 MMHG

## 2021-03-01 DIAGNOSIS — M75.42 SUBACROMIAL IMPINGEMENT OF LEFT SHOULDER: Primary | ICD-10-CM

## 2021-03-01 DIAGNOSIS — M25.512 LEFT SHOULDER PAIN, UNSPECIFIED CHRONICITY: ICD-10-CM

## 2021-03-01 PROCEDURE — 1160F RVW MEDS BY RX/DR IN RCRD: CPT | Performed by: ORTHOPAEDIC SURGERY

## 2021-03-01 PROCEDURE — 3008F BODY MASS INDEX DOCD: CPT | Performed by: ORTHOPAEDIC SURGERY

## 2021-03-01 PROCEDURE — 73030 X-RAY EXAM OF SHOULDER: CPT

## 2021-03-01 PROCEDURE — 99203 OFFICE O/P NEW LOW 30 MIN: CPT | Performed by: ORTHOPAEDIC SURGERY

## 2021-03-01 PROCEDURE — 1036F TOBACCO NON-USER: CPT | Performed by: ORTHOPAEDIC SURGERY

## 2021-03-01 PROCEDURE — 20610 DRAIN/INJ JOINT/BURSA W/O US: CPT | Performed by: ORTHOPAEDIC SURGERY

## 2021-03-01 RX ORDER — BUPIVACAINE HYDROCHLORIDE 2.5 MG/ML
2 INJECTION, SOLUTION INFILTRATION; PERINEURAL
Status: COMPLETED | OUTPATIENT
Start: 2021-03-01 | End: 2021-03-01

## 2021-03-01 RX ORDER — BETAMETHASONE SODIUM PHOSPHATE AND BETAMETHASONE ACETATE 3; 3 MG/ML; MG/ML
6 INJECTION, SUSPENSION INTRA-ARTICULAR; INTRALESIONAL; INTRAMUSCULAR; SOFT TISSUE
Status: COMPLETED | OUTPATIENT
Start: 2021-03-01 | End: 2021-03-01

## 2021-03-01 RX ADMIN — BUPIVACAINE HYDROCHLORIDE 2 ML: 2.5 INJECTION, SOLUTION INFILTRATION; PERINEURAL at 08:48

## 2021-03-01 RX ADMIN — BETAMETHASONE SODIUM PHOSPHATE AND BETAMETHASONE ACETATE 6 MG: 3; 3 INJECTION, SUSPENSION INTRA-ARTICULAR; INTRALESIONAL; INTRAMUSCULAR; SOFT TISSUE at 08:48

## 2021-03-01 NOTE — PROGRESS NOTES
Assessment  Diagnoses and all orders for this visit:    Subacromial impingement of left shoulder    Discussion and Plan:    · The patient has an examination consistent with subacromial impingement syndrome of the left shoulder  I have discussed with the patient the pathophysiology of this diagnosis and reviewed how the examination correlates with this diagnosis  Treatment options were discussed at length and after discussing these treatment options, the patient elected for and received a subacromial injection of corticosteroid (as described in the procedure note) with a prescription for referral to physical therapy  We will reevaluate the patient in 6-8 weeks  If the symptoms fail to improve with this treatment the patient would be indicated for further imaging in the form of an MRI scan of the shoulder  Subjective:   Patient ID: Bryant Myles is a 77 y o  male      The patient presents with a chief complaint of left shoulder pain  The pain began a few month(s) ago and is not associated with an acute injury  The patient describes the pain as aching and dull in intensity,  intermittent in timing, and localizes the pain to the  left subacromial joint, deltoid  The pain is worse with overhead work, overuse and raising arm over head and relieved by rest, ice, avoiding the painful activities  The pain is not associated with numbness and tingling  The pain is not associated with constitutional symptoms  The patient is not awoken at night by the pain  The patient has had prior treatment in the form of a cortisone injection with significant benefit  The following portions of the patient's history were reviewed and updated as appropriate: allergies, current medications, past family history, past medical history, past social history, past surgical history and problem list     Review of Systems   Constitutional: Negative for chills, fatigue, fever and unexpected weight change     HENT: Negative for hearing loss, nosebleeds and sore throat  Eyes: Negative for pain, redness and visual disturbance  Respiratory: Negative for cough, shortness of breath and wheezing  Cardiovascular: Negative for chest pain, palpitations and leg swelling  Gastrointestinal: Negative for abdominal pain, nausea and vomiting  Endocrine: Negative for polydipsia and polyuria  Genitourinary: Negative for frequency and urgency  Skin: Negative for color change, rash and wound  Neurological: Negative for dizziness, weakness, numbness and headaches  Psychiatric/Behavioral: Negative for behavioral problems, self-injury and suicidal ideas  Objective:  /84   Pulse 61   Ht 5' 9" (1 753 m)   Wt 83 9 kg (185 lb)   BMI 27 32 kg/m²       Left Shoulder Exam     Tenderness   The patient is experiencing no tenderness  Range of Motion   The patient has normal left shoulder ROM  Muscle Strength   Abduction: 5/5   External rotation: 4/5     Tests   Pierce test: positive  Drop arm: negative    Other   Erythema: absent  Sensation: normal  Pulse: present     Comments:  (-) Bear Hug              Physical Exam  Constitutional:       General: He is not in acute distress  Appearance: He is well-developed  Eyes:      Conjunctiva/sclera: Conjunctivae normal       Pupils: Pupils are equal, round, and reactive to light  Neck:      Musculoskeletal: Normal range of motion and neck supple  Cardiovascular:      Rate and Rhythm: Normal rate and regular rhythm  Pulmonary:      Effort: Pulmonary effort is normal       Breath sounds: Normal breath sounds  Abdominal:      General: Bowel sounds are normal       Palpations: Abdomen is soft  Skin:     General: Skin is warm and dry  Findings: No erythema or rash  Neurological:      Mental Status: He is alert and oriented to person, place, and time  Deep Tendon Reflexes: Reflexes are normal and symmetric     Psychiatric:         Behavior: Behavior normal  Large joint arthrocentesis: L subacromial bursa  Universal Protocol:  Consent: Verbal consent obtained  Risks and benefits: risks, benefits and alternatives were discussed  Consent given by: patient  Time out: Immediately prior to procedure a "time out" was called to verify the correct patient, procedure, equipment, support staff and site/side marked as required  Site marked: the operative site was marked  Supporting Documentation  Indications: pain and diagnostic evaluation   Procedure Details  Location: shoulder - L subacromial bursa  Preparation: Patient was prepped and draped in the usual sterile fashion  Needle size: 22 G  Ultrasound guidance: no  Approach: lateral  Medications administered: 2 mL bupivacaine 0 25 %; 6 mg betamethasone acetate-betamethasone sodium phosphate 6 (3-3) mg/mL    Patient tolerance: patient tolerated the procedure well with no immediate complications  Dressing:  Sterile dressing applied          I have personally reviewed pertinent films in PACS and my interpretation is as follows  X Ray Left Shoulder: No acute osseous abnormality or degenerative changes      Scribe Attestation    I,:  Eleonora Fuentes am acting as a scribe while in the presence of the attending physician :       I,:  Kori Menezes MD personally performed the services described in this documentation    as scribed in my presence :

## 2021-03-01 NOTE — PATIENT INSTRUCTIONS

## 2021-03-02 ENCOUNTER — HOSPITAL ENCOUNTER (OUTPATIENT)
Dept: CT IMAGING | Facility: HOSPITAL | Age: 66
Discharge: HOME | End: 2021-03-02
Attending: INTERNAL MEDICINE
Payer: COMMERCIAL

## 2021-03-02 ENCOUNTER — HOSPITAL ENCOUNTER (OUTPATIENT)
Dept: NON INVASIVE DIAGNOSTICS | Facility: HOSPITAL | Age: 66
Discharge: HOME | End: 2021-03-02
Payer: COMMERCIAL

## 2021-03-02 DIAGNOSIS — Z82.49 FAMILY HISTORY OF CORONARY ARTERY DISEASE: ICD-10-CM

## 2021-03-02 DIAGNOSIS — R94.31 ABNORMAL EKG: ICD-10-CM

## 2021-03-02 DIAGNOSIS — I49.1 PAC (PREMATURE ATRIAL CONTRACTION): ICD-10-CM

## 2021-03-02 DIAGNOSIS — E78.00 PURE HYPERCHOLESTEROLEMIA: ICD-10-CM

## 2021-03-02 PROCEDURE — 75571 CT HRT W/O DYE W/CA TEST: CPT

## 2021-03-02 PROCEDURE — G1004 CDSM NDSC: HCPCS

## 2021-03-02 PROCEDURE — 93350 STRESS TTE ONLY: CPT

## 2021-03-02 PROCEDURE — 93351 STRESS TTE COMPLETE: CPT | Performed by: INTERNAL MEDICINE

## 2021-03-03 ENCOUNTER — ANESTHESIA (OUTPATIENT)
Dept: GASTROENTEROLOGY | Facility: HOSPITAL | Age: 66
End: 2021-03-03

## 2021-03-03 ENCOUNTER — ANESTHESIA EVENT (OUTPATIENT)
Dept: GASTROENTEROLOGY | Facility: HOSPITAL | Age: 66
End: 2021-03-03

## 2021-03-03 ENCOUNTER — HOSPITAL ENCOUNTER (OUTPATIENT)
Dept: GASTROENTEROLOGY | Facility: HOSPITAL | Age: 66
Setting detail: OUTPATIENT SURGERY
Discharge: HOME | End: 2021-03-03
Attending: INTERNAL MEDICINE
Payer: COMMERCIAL

## 2021-03-03 ENCOUNTER — TELEPHONE (OUTPATIENT)
Dept: FAMILY MEDICINE CLINIC | Facility: CLINIC | Age: 66
End: 2021-03-03

## 2021-03-03 VITALS
DIASTOLIC BLOOD PRESSURE: 78 MMHG | SYSTOLIC BLOOD PRESSURE: 118 MMHG | TEMPERATURE: 96.5 F | BODY MASS INDEX: 26.36 KG/M2 | RESPIRATION RATE: 18 BRPM | HEIGHT: 69 IN | WEIGHT: 178 LBS | OXYGEN SATURATION: 97 % | HEART RATE: 57 BPM

## 2021-03-03 DIAGNOSIS — K21.00 GASTROESOPHAGEAL REFLUX DISEASE WITH ESOPHAGITIS WITHOUT HEMORRHAGE: ICD-10-CM

## 2021-03-03 LAB
CHEST PAIN STATEMENT: NORMAL
MAX DIASTOLIC BP: 86 MMHG
MAX HEART RATE: 171 BPM
MAX PREDICTED HEART RATE: 153 BPM
MAX. SYSTOLIC BP: 194 MMHG
PROTOCOL NAME: NORMAL
REASON FOR TERMINATION: NORMAL
TARGET HR FORMULA: NORMAL
TEST INDICATION: NORMAL
TIME IN EXERCISE PHASE: NORMAL

## 2021-03-03 PROCEDURE — 43239 EGD BIOPSY SINGLE/MULTIPLE: CPT | Performed by: INTERNAL MEDICINE

## 2021-03-03 PROCEDURE — 88305 TISSUE EXAM BY PATHOLOGIST: CPT | Performed by: PATHOLOGY

## 2021-03-03 RX ORDER — SODIUM CHLORIDE 9 MG/ML
INJECTION, SOLUTION INTRAVENOUS CONTINUOUS PRN
Status: DISCONTINUED | OUTPATIENT
Start: 2021-03-03 | End: 2021-03-03

## 2021-03-03 RX ORDER — PROPOFOL 10 MG/ML
INJECTION, EMULSION INTRAVENOUS AS NEEDED
Status: DISCONTINUED | OUTPATIENT
Start: 2021-03-03 | End: 2021-03-03

## 2021-03-03 RX ORDER — LIDOCAINE HYDROCHLORIDE 20 MG/ML
INJECTION, SOLUTION EPIDURAL; INFILTRATION; INTRACAUDAL; PERINEURAL AS NEEDED
Status: DISCONTINUED | OUTPATIENT
Start: 2021-03-03 | End: 2021-03-03

## 2021-03-03 RX ADMIN — PROPOFOL 30 MG: 10 INJECTION, EMULSION INTRAVENOUS at 07:40

## 2021-03-03 RX ADMIN — PROPOFOL 30 MG: 10 INJECTION, EMULSION INTRAVENOUS at 07:38

## 2021-03-03 RX ADMIN — PROPOFOL 120 MG: 10 INJECTION, EMULSION INTRAVENOUS at 07:35

## 2021-03-03 RX ADMIN — PROPOFOL 30 MG: 10 INJECTION, EMULSION INTRAVENOUS at 07:43

## 2021-03-03 RX ADMIN — LIDOCAINE HYDROCHLORIDE 5 ML: 20 INJECTION, SOLUTION EPIDURAL; INFILTRATION; INTRACAUDAL; PERINEURAL at 07:35

## 2021-03-03 RX ADMIN — SODIUM CHLORIDE: 0.9 INJECTION, SOLUTION INTRAVENOUS at 07:33

## 2021-03-03 RX ADMIN — PROPOFOL 30 MG: 10 INJECTION, EMULSION INTRAVENOUS at 07:36

## 2021-03-03 NOTE — ANESTHESIA POSTPROCEDURE EVALUATION
Post-Op Assessment Note    CV Status:  Stable  Pain Score: 0    Pain management: adequate     Mental Status:  Sleepy and arousable   Hydration Status:  Euvolemic   PONV Controlled:  Controlled   Airway Patency:  Patent      Post Op Vitals Reviewed: Yes      Staff: CRNA         No complications documented      /74 (03/03/21 0752)    Temp (!) 96 5 °F (35 8 °C) (03/03/21 0752)    Pulse 58 (03/03/21 0752)   Resp 18 (03/03/21 0752)    SpO2 100 % (03/03/21 0752)

## 2021-03-03 NOTE — H&P
History and Physical - [facility]  Cindy Boston 77 y o  male MRN: [id number]                  HPI: Cindy Boston is a 77y o  year old male who presents for nausea and burping  REVIEW OF SYSTEMS: Per the HPI, and otherwise unremarkable      Historical Information   Past Medical History:   Diagnosis Date    GERD (gastroesophageal reflux disease)      Past Surgical History:   Procedure Laterality Date    CHOLECYSTECTOMY      COLONOSCOPY      EGD      EGD AND COLONOSCOPY      KNEE ARTHROSCOPY Bilateral     KNEE SURGERY Left     benign tumor removed     PA LAP,CHOLECYSTECTOMY N/A 10/9/2020    Procedure: LAPAROSCOPIC CHOLECYSTECTOMY, umbilical hernia repair;  Surgeon: Debbie Shen MD;  Location: [facility];  Service: General    PROSTATE SURGERY      2007     Social History   Social History     Substance and Sexual Activity   Alcohol Use Yes    Alcohol/week: 1 0 standard drinks    Types: 1 Glasses of wine per week    Frequency: 2-4 times a month    Binge frequency: Weekly     Social History     Substance and Sexual Activity   Drug Use Never     Social History     Tobacco Use   Smoking Status Never Smoker   Smokeless Tobacco Never Used     Family History   Problem Relation Age of Onset    Lymphoma Mother     Lung cancer Father     Heart disease Brother     Other Brother         multisystem atrophy       Meds/Allergies       Current Outpatient Medications:     aspirin 81 mg chewable tablet    Cholecalciferol (Dialyvite Vitamin D 5000) 125 MCG (5000 UT) capsule    flecainide (TAMBOCOR) 50 mg tablet    oxazepam (SERAX) 10 mg capsule    pantoprazole (PROTONIX) 40 mg tablet    rosuvastatin (CRESTOR) 5 mg tablet    amitriptyline (ELAVIL) 10 mg tablet    No Known Allergies    Objective     /85   Pulse 61   Temp 97 5 °F (36 4 °C) (Tympanic)   Resp 18   Ht 5' 9" (1 753 m)   Wt 80 7 kg (178 lb)   SpO2 100%   BMI 26 29 kg/m²       PHYSICAL EXAM    Gen: NAD  CV: RRR  CHEST: Clear  ABD: soft, NT/ND  EXT: no edema      ASSESSMENT/PLAN:  This is a 77y o  year old male here for upper endoscopy, and he is stable and optimized for his procedure

## 2021-03-03 NOTE — ANESTHESIA PREPROCEDURE EVALUATION
Procedure:  EGD    Relevant Problems   CARDIO   (+) PAC (premature atrial contraction)   (+) Pure hypercholesterolemia      MUSCULOSKELETAL   (+) Pyriformis syndrome, unspecified laterality      NEURO/PSYCH   (+) History of colon polyps        Physical Exam    Airway    Mallampati score: II  TM Distance: >3 FB  Neck ROM: full     Dental       Cardiovascular  Rhythm: regular, Rate: normal,     Pulmonary  Breath sounds clear to auscultation,     Other Findings        Anesthesia Plan  ASA Score- 2     Anesthesia Type- IV sedation with anesthesia with ASA Monitors  Additional Monitors:   Airway Plan:           Plan Factors-Exercise tolerance (METS): >4 METS  Chart reviewed  EKG reviewed  Patient is not a current smoker  Induction- intravenous  Postoperative Plan-     Informed Consent- Anesthetic plan and risks discussed with patient  I personally reviewed this patient with the CRNA  Discussed and agreed on the Anesthesia Plan with the CRNA  Luis Oscar

## 2021-03-03 NOTE — TELEPHONE ENCOUNTER
Pt called - said he was told by his cardiologist that it would be in his best interest to increase the Crestor to a daily medication rather than just M-W-F   Please advise

## 2021-03-04 NOTE — TELEPHONE ENCOUNTER
I called patient and spoke with him tonight  Patient had calcium score of 500 and stated that 5 years ago   it was 50  We thus agreed to increase Crestor to 5 mg daily and repeat lipid profile in about 2 months  May want to increase to 10 mg daily?       MELINDA

## 2021-03-05 ENCOUNTER — PREP FOR PROCEDURE (OUTPATIENT)
Dept: GASTROENTEROLOGY | Facility: MEDICAL CENTER | Age: 66
End: 2021-03-05

## 2021-03-05 ENCOUNTER — EVALUATION (OUTPATIENT)
Dept: PHYSICAL THERAPY | Facility: CLINIC | Age: 66
End: 2021-03-05
Payer: COMMERCIAL

## 2021-03-05 ENCOUNTER — TELEPHONE (OUTPATIENT)
Dept: GASTROENTEROLOGY | Facility: CLINIC | Age: 66
End: 2021-03-05

## 2021-03-05 DIAGNOSIS — Z86.010 HISTORY OF COLON POLYPS: ICD-10-CM

## 2021-03-05 DIAGNOSIS — Z80.0 FAMILY HISTORY OF COLON CANCER: Primary | ICD-10-CM

## 2021-03-05 DIAGNOSIS — M75.42 IMPINGEMENT SYNDROME OF LEFT SHOULDER: Primary | ICD-10-CM

## 2021-03-05 DIAGNOSIS — M75.42 SUBACROMIAL IMPINGEMENT OF LEFT SHOULDER: ICD-10-CM

## 2021-03-05 PROCEDURE — 97161 PT EVAL LOW COMPLEX 20 MIN: CPT | Performed by: PHYSICAL THERAPIST

## 2021-03-05 NOTE — PROGRESS NOTES
PT Evaluation     Today's date: 3/5/2021  Patient name: Renata Lipscomb  : 1955  MRN: [id number]  Referring provider: Lore Kehr, MD  Dx:   Encounter Diagnosis     ICD-10-CM    1  Impingement syndrome of left shoulder  M75 42                   Assessment  Assessment details: Renata Lipscomb is a 77 y o  male presenting to outpatient physical therapy at [facility] with complaints of L shoulder and anterior arm pain  He presents with decreased postural and shoulder ER strength, limited flexibility, poor postural awareness, decreased tolerance to activity and decreased functional mobility due to Impingement syndrome of left shoulder (primary encounter diagnosis)  He would benefit from skilled PT services in order to address these deficits and reach maximum level of function  Thank you for the referral!  Impairments: activity intolerance, impaired physical strength, lacks appropriate home exercise program, pain with function and poor posture   Barriers to therapy: None  Understanding of Dx/Px/POC: excellent  Goals  ST  Independent with HEP in 2 weeks  2  Good postural awareness in 3 weeks     LT  Achieve FOTO score of 72/100 in 6 weeks   2  Able to reach across body without L UE pain in 6 weeks  3    Strength L shoulder ER and traps = 5/5 in 6 weeks      Plan  Patient would benefit from: skilled PT and PT eval  Planned modality interventions: cryotherapy, TENS and thermotherapy: hydrocollator packs  Other planned modality interventions: laser  Planned therapy interventions: ADL retraining, body mechanics training, flexibility, functional ROM exercises, home exercise program, joint mobilization, manual therapy, neuromuscular re-education, postural training, strengthening, stretching, therapeutic activities and therapeutic exercise  Frequency: 2x week  Duration in weeks: 6  Treatment plan discussed with: patient        Subjective Evaluation    History of Present Illness  Mechanism of injury: Pt reports having L shoulder pain  The pain began a few months ago and is not associated with an acute injury specifically in L AC jt  He is not awoken at night by the pain  He has had a prior cortisone injection with significant benefit and had another one on 3/1/21, but only min improvement this time  Pt has PAC and lumbar degeneration  Working full time as a surgeon  Most pain is reaching across his body  Not a recurrent problem   Quality of life: excellent    Pain  Current pain rating: 3  At best pain rating: 3  At worst pain ratin  Quality: dull ache  Relieving factors: ice and rest  Aggravating factors: overhead activity    Social Support  Lives with: spouse    Employment status: working  Hand dominance: right      Diagnostic Tests  X-ray: abnormal (slight DJD L AC jt)  Treatments  Previous treatment: injection treatment  Patient Goals  Patient goals for therapy: increased strength, return to sport/leisure activities and decreased pain          Objective     Static Posture     Head  Forward  Shoulders  Rounded  Postural Observations  Seated posture: fair  Standing posture: fair  Correction of posture: makes symptoms better        Palpation   Left   No palpable tenderness to the biceps  Tenderness of the anterior deltoid  Additional Palpation Details  Mod tightness L>R pec minor  Tenderness     Left Shoulder   Tenderness in the bicipital groove  No tenderness in the [facility] [place] joint and biceps tendon (proximal)  Neurological Testing     Sensation     Shoulder   Left Shoulder   Intact: light touch    Right Shoulder   Intact: light touch    Reflexes   Left   Biceps (C5/C6): normal (2+)    Right   Biceps (C5/C6): normal (2+)    Active Range of Motion   Left Shoulder   Normal active range of motion    Joint Play   Left Shoulder  Joints within functional limits are the anterior capsule, posterior capsule and inferior capsule  Hypomobile in the [facility] [place] joint      Strength/Myotome Testing Left Shoulder     Planes of Motion   Flexion: 5   Extension: 5   Abduction: 5   External rotation at 0°: 4 (Pain)   Internal rotation at 0°: 5     Isolated Muscles   Middle deltoid: 4+     Right Shoulder   Normal muscle strength    Tests     Left Shoulder   Negative empty can, Shahid's and Hellen's                POC EXPIRES On:  4/16/21  PRECAUTIONS:  None  CO-MORBIDITES:  Lumbar DDD, PAC  PERSONAL FACTORS:  None      Manuals HEP 3/5                                                               Neuro Re-Ed     Seated B scap retraction  3/5 5           Doorway pec stretch 3/5 15" 3                                                                            Ther Ex                                                                                                            Ther Activity                              Gait Training                              Modalities    Laser L anterior prox arm  5'

## 2021-03-05 NOTE — TELEPHONE ENCOUNTER
----- Message from Pawan Gardner MD sent at 3/5/2021  1:36 PM EST -----  Regarding: colonoscopy  Please schedule him for a surveillance colonoscopy with me at [address] in approximately three months period he has personal history of polyps and a family history of colon cancer he already has a Suprep but will still need the instructions  I placed the orders

## 2021-03-05 NOTE — RESULT ENCOUNTER NOTE
I called him with his results  I suspect his burping and belching is due to the small hiatal hernia  Will schedule him for a routine surveillance colonoscopy in 3 months

## 2021-03-05 NOTE — TELEPHONE ENCOUNTER
I spoke with Roula De Paz  He requested to schedule procedure in July/Aug  Pt aware schedule not available   Recall Set

## 2021-03-08 DIAGNOSIS — E78.2 MIXED HYPERLIPIDEMIA: Primary | ICD-10-CM

## 2021-03-08 RX ORDER — ROSUVASTATIN CALCIUM 5 MG/1
5 TABLET, COATED ORAL DAILY
Qty: 90 TABLET | Refills: 3 | Status: SHIPPED | OUTPATIENT
Start: 2021-03-08 | End: 2021-12-15 | Stop reason: SDUPTHER

## 2021-03-08 NOTE — TELEPHONE ENCOUNTER
Patient called for refill of Rosuvastatin 5 mg daily  Also mentioned order for another lipid panel from what you dicussed via phone  Order was placed under labs    Order for refill is pending approval

## 2021-03-10 ENCOUNTER — EVALUATION (OUTPATIENT)
Dept: PHYSICAL THERAPY | Facility: CLINIC | Age: 66
End: 2021-03-10
Payer: COMMERCIAL

## 2021-03-10 DIAGNOSIS — M75.42 IMPINGEMENT SYNDROME OF LEFT SHOULDER: Primary | ICD-10-CM

## 2021-03-10 DIAGNOSIS — M75.42 SUBACROMIAL IMPINGEMENT OF LEFT SHOULDER: ICD-10-CM

## 2021-03-10 PROCEDURE — 97112 NEUROMUSCULAR REEDUCATION: CPT | Performed by: PHYSICAL THERAPIST

## 2021-03-10 PROCEDURE — 97140 MANUAL THERAPY 1/> REGIONS: CPT | Performed by: PHYSICAL THERAPIST

## 2021-03-10 NOTE — PROGRESS NOTES
Daily Note + D/C    Today's date: 3/10/2021  Patient name: Ellie Kwok  : 1955  MRN: [id number]  Referring provider: Dina Li MD  Dx:   Encounter Diagnosis     ICD-10-CM    1  Impingement syndrome of left shoulder  M75 42    2  Subacromial impingement of left shoulder  M75 42         Addendum 21:  Pt feeling good  Ready for D/C  Subjective:  Pt reports feeling better about his posture which is helping to lessen shoulder pain  Objective:  See treatment diary below      Assessment:  Pt presented to outpatient physical therapy at [place] [id number] with complaints of L shoulder and anterior arm pain  He presented with decreased postural and shoulder ER strength, limited flexibility, poor postural awareness, decreased tolerance to activity and decreased functional mobility due to Impingement syndrome of left shoulder (primary encounter diagnosis)  He will continue to benefit from skilled PT services in order to address these deficits and reach maximum level of function  Mod less tightness post graston today  He is much more aware of his posture and did well with all new exercises today  Plan:  PT 1x/wk x 2-3 weeks  Focus on postural strength          POC EXPIRES On:  21  PRECAUTIONS:  None  CO-MORBIDITES:  Lumbar DDD, PAC  PERSONAL FACTORS:  None      Manuals HEP 3/5 3/10          Graston L ant/lat shoulder   8'                                                 Neuro Re-Ed     Seated B scap retraction  3/5 5           Doorway pec stretch 3/5 15" 3 15" 3          TB rows B 3/10  Colon 10          Prone rows L 3/10  10          Prone traps 3 way 3/10  10 ea                                    Ther Ex                                                                                                            Ther Activity                              Gait Training                              Modalities    Laser L anterior prox arm  5' 5'

## 2021-03-24 ENCOUNTER — APPOINTMENT (OUTPATIENT)
Dept: PHYSICAL THERAPY | Facility: CLINIC | Age: 66
End: 2021-03-24
Payer: COMMERCIAL

## 2021-03-24 ENCOUNTER — APPOINTMENT (OUTPATIENT)
Dept: LAB | Facility: HOSPITAL | Age: 66
End: 2021-03-24
Payer: COMMERCIAL

## 2021-03-24 DIAGNOSIS — E78.2 MIXED HYPERLIPIDEMIA: ICD-10-CM

## 2021-03-24 LAB
CHOLEST SERPL-MCNC: 181 MG/DL (ref 50–200)
HDLC SERPL-MCNC: 84 MG/DL
LDLC SERPL CALC-MCNC: 79 MG/DL (ref 0–100)
NONHDLC SERPL-MCNC: 97 MG/DL
TRIGL SERPL-MCNC: 90 MG/DL

## 2021-03-24 PROCEDURE — 36415 COLL VENOUS BLD VENIPUNCTURE: CPT

## 2021-03-24 PROCEDURE — 80061 LIPID PANEL: CPT

## 2021-03-25 ENCOUNTER — TELEPHONE (OUTPATIENT)
Dept: FAMILY MEDICINE CLINIC | Facility: CLINIC | Age: 66
End: 2021-03-25

## 2021-03-25 NOTE — TELEPHONE ENCOUNTER
Patient informed of lipid panel results  Dr Ambar Tong is happy with them  Patient is happy with the results, too  His stress test also came back good  He said "thanks for the support"!

## 2021-04-14 DIAGNOSIS — F32.A DEPRESSION, UNSPECIFIED DEPRESSION TYPE: Primary | ICD-10-CM

## 2021-04-14 RX ORDER — ESCITALOPRAM OXALATE 10 MG/1
10 TABLET ORAL DAILY
Qty: 90 TABLET | Refills: 3 | Status: SHIPPED | OUTPATIENT
Start: 2021-04-14 | End: 2021-07-26

## 2021-04-14 RX ORDER — ESCITALOPRAM OXALATE 10 MG/1
10 TABLET ORAL DAILY
COMMUNITY
End: 2021-04-14 | Stop reason: SDUPTHER

## 2021-04-20 DIAGNOSIS — K30 FUNCTIONAL DYSPEPSIA: ICD-10-CM

## 2021-04-20 RX ORDER — OXAZEPAM 10 MG
10 CAPSULE ORAL 2 TIMES DAILY
Qty: 60 CAPSULE | Refills: 5 | Status: SHIPPED | OUTPATIENT
Start: 2021-04-20 | End: 2021-11-19

## 2021-05-04 ENCOUNTER — TELEPHONE (OUTPATIENT)
Dept: VASCULAR SURGERY | Facility: CLINIC | Age: 66
End: 2021-05-04

## 2021-05-05 NOTE — TELEPHONE ENCOUNTER
Colon scheduled on 9/2/21 with Dr Byers at [place]  I gave pt verbal instructions/mailed  Pt requested Sept due to work schedule   Prep-Suprep

## 2021-06-10 ENCOUNTER — TELEPHONE (OUTPATIENT)
Dept: FAMILY MEDICINE CLINIC | Facility: CLINIC | Age: 66
End: 2021-06-10

## 2021-06-10 DIAGNOSIS — Z85.46 PERSONAL HISTORY OF MALIGNANT NEOPLASM OF PROSTATE: Primary | ICD-10-CM

## 2021-06-10 DIAGNOSIS — E78.00 PURE HYPERCHOLESTEROLEMIA: ICD-10-CM

## 2021-06-10 DIAGNOSIS — R53.83 FATIGUE, UNSPECIFIED TYPE: ICD-10-CM

## 2021-06-10 DIAGNOSIS — E55.9 VITAMIN D DEFICIENCY: ICD-10-CM

## 2021-06-10 DIAGNOSIS — M25.50 ARTHRALGIA, UNSPECIFIED JOINT: ICD-10-CM

## 2021-06-16 ENCOUNTER — OFFICE VISIT (OUTPATIENT)
Dept: PHYSICAL THERAPY | Facility: CLINIC | Age: 66
End: 2021-06-16
Payer: COMMERCIAL

## 2021-06-16 DIAGNOSIS — S33.2XXD SI JOINT DISLOCATION, SUBSEQUENT ENCOUNTER: Primary | ICD-10-CM

## 2021-06-16 PROCEDURE — 97140 MANUAL THERAPY 1/> REGIONS: CPT | Performed by: PHYSICAL THERAPIST

## 2021-06-16 PROCEDURE — 97161 PT EVAL LOW COMPLEX 20 MIN: CPT | Performed by: PHYSICAL THERAPIST

## 2021-06-16 NOTE — PROGRESS NOTES
PT Evaluation + D/C    Today's date: 2021  Patient name: Simon Aceves  : 1955  MRN: [id number]  Referring provider: Linh Sahu, PT  Dx:   Encounter Diagnosis     ICD-10-CM    1  SI joint dislocation, subsequent encounter  S33  2XXD                   Assessment  Assessment details: Simon Aceves is a 77 y o  male presenting to outpatient physical therapy at [facility] with complaints of L LBP  He presents with decreased core strength, limited flexibility, poor postural awareness, decreased tolerance to activity and decreased functional mobility due to L SI jt dislocation  He would benefit from skilled PT services in order to address these deficits and reach maximum level of function  Impairments: activity intolerance, impaired physical strength, lacks appropriate home exercise program, pain with function and poor body mechanics  Barriers to therapy: None  Understanding of Dx/Px/POC: excellent  Goals  ST  Independent with HEP in 2 weeks  2  Good postural awareness and body mechanics in 2 weeks     LT  Achieve FOTO score of 81/100 in 4 weeks   2  Able to gold without L LBP in 4 weeks  3  Strength abdominals = 5/5 in 4 weeks  4    No tenderness or tightness L SI region in 4 weeks    Plan  Patient would benefit from: skilled PT and PT eval  Planned modality interventions: cryotherapy, TENS and thermotherapy: hydrocollator packs  Other planned modality interventions: laser  Planned therapy interventions: ADL retraining, body mechanics training, flexibility, functional ROM exercises, home exercise program, joint mobilization, manual therapy, neuromuscular re-education, postural training, strengthening, stretching, therapeutic activities and therapeutic exercise  Frequency: 2x week  Duration in weeks: 4  Plan of Care beginning date: 2021  Plan of Care expiration date: 2021  Treatment plan discussed with: patient        Subjective Evaluation    History of Present Illness  Mechanism of injury: Pt reports playing golf on 21 and felt L PSIS pain  Has been able to play golf since then, but with pain  Pain is bad with rolling over in bed, but better as the day goes on  Pain with getting out of the car and twisting to the L  No problems sleeping  Pt has PAC and lumbar degeneration  Working full time as a vascular surgeon  Has not been able to run, but can bike  Not a recurrent problem   Quality of life: excellent    Pain  Current pain ratin  At best pain ratin  At worst pain ratin  Quality: sharp  Aggravating factors: lifting and running  Progression: improved    Social Support  Lives with: spouse    Employment status: working  Hand dominance: right    Treatments  No previous or current treatments  Patient Goals  Patient goals for therapy: increased strength, return to sport/leisure activities and decreased pain          Objective     Concurrent Complaints  Negative for night pain, disturbed sleep, bladder dysfunction and bowel dysfunction    Postural Observations  Seated posture: fair  Standing posture: fair  Correction of posture: makes symptoms better        Palpation     Additional Palpation Details  Mod tightness L>R pec minor  Mod tightness and tenderness L SI jt     Neurological Testing     Sensation     Lumbar   Left   Intact: light touch    Right   Intact: light touch    Active Range of Motion     Lumbar   Normal active range of motion    Strength/Myotome Testing     Left Shoulder     Planes of Motion   Extension: 5   Left shoulder external rotation strength at 0 degrees: Pain  Lumbar   Left   Normal strength    Right   Normal strength    Additional Strength Details  Abdominals = 4/5  Tests     Additional Tests Details  More pain with L SI jt compression       Ambulation     Observational Gait   Gait: within functional limits              POC EXPIRES On:  21  PRECAUTIONS:  None  CO-MORBIDITES:  Lumbar DDD, PAC  PERSONAL FACTORS: None      Manuals HEP 6/16           L sacral out flare prone  5'           Lumbar rotational mobs L S/L  3'                                     Neuro Re-Ed     Supine PPT 6/16 5" 15                                                                                         Ther Ex    Supine cross leg stretch to R 6/16 10" 5                                                                                                      Ther Activity                              Gait Training                              Modalities

## 2021-07-20 ENCOUNTER — APPOINTMENT (OUTPATIENT)
Dept: LAB | Facility: HOSPITAL | Age: 66
End: 2021-07-20
Payer: COMMERCIAL

## 2021-07-20 DIAGNOSIS — E78.00 PURE HYPERCHOLESTEROLEMIA: ICD-10-CM

## 2021-07-20 DIAGNOSIS — M25.50 ARTHRALGIA, UNSPECIFIED JOINT: ICD-10-CM

## 2021-07-20 DIAGNOSIS — E55.9 VITAMIN D DEFICIENCY: ICD-10-CM

## 2021-07-20 DIAGNOSIS — Z85.46 PERSONAL HISTORY OF MALIGNANT NEOPLASM OF PROSTATE: ICD-10-CM

## 2021-07-20 DIAGNOSIS — R53.83 FATIGUE, UNSPECIFIED TYPE: ICD-10-CM

## 2021-07-20 LAB
25(OH)D3 SERPL-MCNC: 47.8 NG/ML (ref 30–100)
ALBUMIN SERPL BCP-MCNC: 3.6 G/DL (ref 3.5–5)
ALP SERPL-CCNC: 57 U/L (ref 46–116)
ALT SERPL W P-5'-P-CCNC: 23 U/L (ref 12–78)
ANION GAP SERPL CALCULATED.3IONS-SCNC: 7 MMOL/L (ref 4–13)
AST SERPL W P-5'-P-CCNC: 16 U/L (ref 5–45)
BASOPHILS # BLD AUTO: 0.02 THOUSANDS/ΜL (ref 0–0.1)
BASOPHILS NFR BLD AUTO: 0 % (ref 0–1)
BILIRUB SERPL-MCNC: 1.48 MG/DL (ref 0.2–1)
BILIRUB UR QL STRIP: NEGATIVE
BUN SERPL-MCNC: 20 MG/DL (ref 5–25)
CALCIUM SERPL-MCNC: 8.9 MG/DL (ref 8.3–10.1)
CHLORIDE SERPL-SCNC: 101 MMOL/L (ref 100–108)
CHOLEST SERPL-MCNC: 170 MG/DL (ref 50–200)
CLARITY UR: CLEAR
CO2 SERPL-SCNC: 26 MMOL/L (ref 21–32)
COLOR UR: YELLOW
CREAT SERPL-MCNC: 1.14 MG/DL (ref 0.6–1.3)
EOSINOPHIL # BLD AUTO: 0.09 THOUSAND/ΜL (ref 0–0.61)
EOSINOPHIL NFR BLD AUTO: 2 % (ref 0–6)
ERYTHROCYTE [DISTWIDTH] IN BLOOD BY AUTOMATED COUNT: 12.2 % (ref 11.6–15.1)
GFR SERPL CREATININE-BSD FRML MDRD: 66 ML/MIN/1.73SQ M
GLUCOSE P FAST SERPL-MCNC: 94 MG/DL (ref 65–99)
GLUCOSE UR STRIP-MCNC: NEGATIVE MG/DL
HCT VFR BLD AUTO: 44.2 % (ref 36.5–49.3)
HDLC SERPL-MCNC: 78 MG/DL
HGB BLD-MCNC: 14.1 G/DL (ref 12–17)
HGB UR QL STRIP.AUTO: NEGATIVE
IMM GRANULOCYTES # BLD AUTO: 0.02 THOUSAND/UL (ref 0–0.2)
IMM GRANULOCYTES NFR BLD AUTO: 0 % (ref 0–2)
KETONES UR STRIP-MCNC: NEGATIVE MG/DL
LDLC SERPL CALC-MCNC: 75 MG/DL (ref 0–100)
LEUKOCYTE ESTERASE UR QL STRIP: NEGATIVE
LYMPHOCYTES # BLD AUTO: 1.19 THOUSANDS/ΜL (ref 0.6–4.47)
LYMPHOCYTES NFR BLD AUTO: 24 % (ref 14–44)
MCH RBC QN AUTO: 29 PG (ref 26.8–34.3)
MCHC RBC AUTO-ENTMCNC: 31.9 G/DL (ref 31.4–37.4)
MCV RBC AUTO: 91 FL (ref 82–98)
MONOCYTES # BLD AUTO: 0.53 THOUSAND/ΜL (ref 0.17–1.22)
MONOCYTES NFR BLD AUTO: 11 % (ref 4–12)
NEUTROPHILS # BLD AUTO: 3.03 THOUSANDS/ΜL (ref 1.85–7.62)
NEUTS SEG NFR BLD AUTO: 63 % (ref 43–75)
NITRITE UR QL STRIP: NEGATIVE
NONHDLC SERPL-MCNC: 92 MG/DL
NRBC BLD AUTO-RTO: 0 /100 WBCS
PH UR STRIP.AUTO: 6.5 [PH]
PLATELET # BLD AUTO: 255 THOUSANDS/UL (ref 149–390)
PMV BLD AUTO: 9.4 FL (ref 8.9–12.7)
POTASSIUM SERPL-SCNC: 3.7 MMOL/L (ref 3.5–5.3)
PROT SERPL-MCNC: 7.2 G/DL (ref 6.4–8.2)
PROT UR STRIP-MCNC: NEGATIVE MG/DL
PSA SERPL-MCNC: <0.1 NG/ML (ref 0–4)
RBC # BLD AUTO: 4.86 MILLION/UL (ref 3.88–5.62)
RHEUMATOID FACT SER QL LA: NEGATIVE
SODIUM SERPL-SCNC: 134 MMOL/L (ref 136–145)
SP GR UR STRIP.AUTO: 1 (ref 1–1.03)
TRIGL SERPL-MCNC: 84 MG/DL
TSH SERPL DL<=0.05 MIU/L-ACNC: 1.45 UIU/ML (ref 0.36–3.74)
UROBILINOGEN UR QL STRIP.AUTO: 0.2 E.U./DL
WBC # BLD AUTO: 4.88 THOUSAND/UL (ref 4.31–10.16)

## 2021-07-20 PROCEDURE — 36415 COLL VENOUS BLD VENIPUNCTURE: CPT

## 2021-07-20 PROCEDURE — 80053 COMPREHEN METABOLIC PANEL: CPT

## 2021-07-20 PROCEDURE — 85025 COMPLETE CBC W/AUTO DIFF WBC: CPT

## 2021-07-20 PROCEDURE — 82306 VITAMIN D 25 HYDROXY: CPT

## 2021-07-20 PROCEDURE — 81003 URINALYSIS AUTO W/O SCOPE: CPT

## 2021-07-20 PROCEDURE — 84443 ASSAY THYROID STIM HORMONE: CPT

## 2021-07-20 PROCEDURE — G0103 PSA SCREENING: HCPCS

## 2021-07-20 PROCEDURE — 86430 RHEUMATOID FACTOR TEST QUAL: CPT

## 2021-07-20 PROCEDURE — 80061 LIPID PANEL: CPT

## 2021-07-26 ENCOUNTER — OFFICE VISIT (OUTPATIENT)
Dept: FAMILY MEDICINE CLINIC | Facility: CLINIC | Age: 66
End: 2021-07-26
Payer: COMMERCIAL

## 2021-07-26 VITALS
RESPIRATION RATE: 18 BRPM | OXYGEN SATURATION: 99 % | WEIGHT: 181 LBS | HEIGHT: 69 IN | SYSTOLIC BLOOD PRESSURE: 120 MMHG | DIASTOLIC BLOOD PRESSURE: 68 MMHG | HEART RATE: 56 BPM | BODY MASS INDEX: 26.81 KG/M2 | TEMPERATURE: 96.3 F

## 2021-07-26 DIAGNOSIS — E80.6 HYPERBILIRUBINEMIA: ICD-10-CM

## 2021-07-26 DIAGNOSIS — Z00.00 ANNUAL PHYSICAL EXAM: Primary | ICD-10-CM

## 2021-07-26 DIAGNOSIS — C61 MALIGNANT NEOPLASM OF PROSTATE (HCC): ICD-10-CM

## 2021-07-26 DIAGNOSIS — L30.9 DERMATITIS: ICD-10-CM

## 2021-07-26 PROCEDURE — 1101F PT FALLS ASSESS-DOCD LE1/YR: CPT | Performed by: FAMILY MEDICINE

## 2021-07-26 PROCEDURE — 99397 PER PM REEVAL EST PAT 65+ YR: CPT | Performed by: FAMILY MEDICINE

## 2021-07-26 PROCEDURE — 3725F SCREEN DEPRESSION PERFORMED: CPT | Performed by: FAMILY MEDICINE

## 2021-07-26 PROCEDURE — 1036F TOBACCO NON-USER: CPT | Performed by: FAMILY MEDICINE

## 2021-07-26 PROCEDURE — 1160F RVW MEDS BY RX/DR IN RCRD: CPT | Performed by: FAMILY MEDICINE

## 2021-07-26 PROCEDURE — 3288F FALL RISK ASSESSMENT DOCD: CPT | Performed by: FAMILY MEDICINE

## 2021-07-26 PROCEDURE — 3008F BODY MASS INDEX DOCD: CPT | Performed by: FAMILY MEDICINE

## 2021-07-26 RX ORDER — TRIAMCINOLONE ACETONIDE 1 MG/G
CREAM TOPICAL 2 TIMES DAILY
Qty: 30 G | Refills: 0 | Status: SHIPPED | OUTPATIENT
Start: 2021-07-26 | End: 2021-12-15

## 2021-07-26 NOTE — PROGRESS NOTES
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, MD, vascular surgeon, 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 - he feels great    10 yrs ago he went to [place], [place], for the full day executive PE - cardiac score then was 50, now 500  Worried  Parents - mother  in her 52's of Hodgkins, and father  lung cancer  (heavy smoker)  2 brothers also :   One of MI -, smoker -  and the other of MSA (multiple system atrophy)  He  after 9 yrs  Assessment/Plan: Annual PE         Problem List Items Addressed This Visit     None      Visit Diagnoses     Annual physical exam    -  Primary            Subjective:  Alert oriented in no acute distress 58-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, asymptomatic, in need of annual physical evaluation     Patient ID: Virgil Logan is a 77 y o  male  HPI--77year-old gentleman in no acute distress here for annual physical evaluation  He is new patient to me other than from virtual visits earlier in the year  He is being followed for chronic nausea, anxiety, history of prostate cancer  with very early , and is now high calcium score going from  in 10 years    He is well exercised rides bike about every week or so 30-40 miles and exercises daily    The following portions of the patient's history were reviewed and updated as appropriate:   Past Medical History:  He has a past medical history of GERD (gastroesophageal reflux disease)  ,  _______________________________________________________________________  Medical Problems:  does not have any pertinent problems on file ,  _______________________________________________________________________  Past Surgical History:   has a past surgical history that includes Prostate surgery; Knee arthroscopy (Bilateral); Knee surgery (Left); Colonoscopy; EGD AND COLONOSCOPY; EGD; pr lap,cholecystectomy (N/A, 10/9/2020); and Cholecystectomy  ,  _______________________________________________________________________  Family History:  family history includes Heart disease in his brother; Lung cancer in his father; Lymphoma in his mother; Other in his brother ,  _______________________________________________________________________  Social History:   reports that he has never smoked  He has never used smokeless tobacco  He reports current alcohol use of about 1 0 standard drinks of alcohol per week  He reports that he does not use drugs  ,  _______________________________________________________________________  Allergies:  has No Known Allergies     _______________________________________________________________________  Current Outpatient Medications   Medication Sig Dispense Refill    aspirin 81 mg chewable tablet Chew      Cholecalciferol (Dialyvite Vitamin D 5000) 125 MCG (5000 UT) capsule Take 5,000 Units by mouth daily      flecainide (TAMBOCOR) 50 mg tablet Take 1 tablet (50 mg total) by mouth 2 (two) times a day 180 tablet 3    oxazepam (SERAX) 10 mg capsule Take 1 capsule (10 mg total) by mouth 2 (two) times a day 60 capsule 5    rosuvastatin (Crestor) 5 mg tablet Take 1 tablet (5 mg total) by mouth daily 1 tab every day for chol 90 tablet 3    pantoprazole (PROTONIX) 40 mg tablet TAKE 1 TABLET BY MOUTH TWICE A DAY FOR 14 DAYS (Patient not taking: Reported on 7/26/2021) 90 tablet 1     No current facility-administered medications for this visit  _______________________________________________________________________  Review of Systems  see 2nd note under annual physical    Objective:  Vitals:    21 1456   BP: 120/68   Pulse: 56   Resp: 18   Temp: (!) 96 3 °F (35 7 °C)   SpO2: 99%   Weight: 82 1 kg (181 lb)   Height: 5' 9" (1 753 m)     Body mass index is 26 73 kg/m²  Physical Exam  see 2nd note under annual physical    Issues to date:      Calcium score went from 50  Ten yrs go in [place] and now 500 ! Saw Dr Sam Spivey - for anxiety - only saw him once  Saw Dr Saskia Gambino after 2 brothers  in same yr

## 2021-07-26 NOTE — PATIENT INSTRUCTIONS

## 2021-07-26 NOTE — PROGRESS NOTES
[facility] [place]    NAME: Peggy Yu  AGE: 77 y o  SEX: male  : 1955     DATE: 2021     Assessment and Plan:     Problem List Items Addressed This Visit     None          Immunizations and preventive care screenings were discussed with patient today  Appropriate education was printed on patient's after visit summary  Counseling:  Alcohol/drug use: discussed moderation in alcohol intake, the recommendations for healthy alcohol use, and avoidance of illicit drug use  Dental Health: discussed importance of regular tooth brushing, flossing, and dental visits  Injury prevention: discussed safety/seat belts, safety helmets, smoke detectors, carbon dioxide detectors, and smoking near bedding or upholstery  Sexual health: discussed sexually transmitted diseases, partner selection, use of condoms, avoidance of unintended pregnancy, and contraceptive alternatives  · Exercise: the importance of regular exercise/physical activity was discussed  Recommend exercise 3-5 times per week for at least 30 minutes  BMI Counseling: Body mass index is 26 73 kg/m²  The BMI is above normal  Nutrition recommendations include decreasing portion sizes, encouraging healthy choices of fruits and vegetables, decreasing fast food intake, consuming healthier snacks, limiting drinks that contain sugar, moderation in carbohydrate intake, increasing intake of lean protein, reducing intake of saturated and trans fat and reducing intake of cholesterol  Exercise recommendations include moderate physical activity 150 minutes/week and exercising 3-5 times per week  No pharmacotherapy was ordered  No follow-ups on file  Chief Complaint:     Chief Complaint   Patient presents with    Physical Exam     itchy spot on his back in the middle of his back, patient states it has been there for about a few months or so        History of Present Illness: Adult Annual Physical   Patient here for a comprehensive physical exam  The patient reports no problems  Diet and Physical Activity  · Diet/Nutrition: well balanced diet, low fat diet, low carb diet, consuming 3-5 servings of fruits/vegetables daily, adequate fiber intake and adequate whole grain intake  · Exercise: walking, vigorous cardiovascular exercise, 5-7 times a week on average and 30-60 minutes on average  Depression Screening  PHQ-9 Depression Screening    PHQ-9:   Frequency of the following problems over the past two weeks:      Little interest or pleasure in doing things: 0 - not at all  Feeling down, depressed, or hopeless: 0 - not at all  PHQ-2 Score: 0       General Health  · Sleep: sleeps well and gets 4-6 hours of sleep on average  · Hearing: normal - bilateral   · Vision: no vision problems, goes for regular eye exams, most recent eye exam <1 year ago and wears glasses  · Dental: regular dental visits, brushes teeth twice daily and flosses teeth occasionally   Health  · Symptoms include: erectile dysfunction     Review of Systems:     Review of Systems   Constitutional: Activity change: Biking q Sun with group 30-40 mi twice a moFrun  HENT: Negative  Eyes: Negative  Respiratory: Negative  Endocrine: Negative  Genitourinary: Negative  Prostate surg 2007 -- Dr Lewis Tobar found it on ARNEL, 2% of gland only, had robotic and doing well  Musculoskeletal: Negative  Allergic/Immunologic: Negative  Neurological: Negative  Hematological: Negative  Psychiatric/Behavioral: Negative         Past Medical History:     Past Medical History:   Diagnosis Date    GERD (gastroesophageal reflux disease)       Past Surgical History:     Past Surgical History:   Procedure Laterality Date    CHOLECYSTECTOMY      COLONOSCOPY      EGD      EGD AND COLONOSCOPY      KNEE ARTHROSCOPY Bilateral     KNEE SURGERY Left     benign tumor removed     [place] LAP,CHOLECYSTECTOMY N/A 10/9/2020    Procedure: LAPAROSCOPIC CHOLECYSTECTOMY, umbilical hernia repair;  Surgeon: Surjit Riggs MD;  [facility];  Service: General    PROSTATE SURGERY      2007      Family History:     Family History   Problem Relation Age of Onset    Lymphoma Mother     Lung cancer Father     Heart disease Brother     Other Brother         multisystem atrophy      Social History:     Social History     Socioeconomic History    Marital status: /Civil Union     Spouse name: None    Number of children: None    Years of education: None    Highest education level: None   Occupational History    None   Tobacco Use    Smoking status: Never Smoker    Smokeless tobacco: Never Used   Vaping Use    Vaping Use: Never used   Substance and Sexual Activity    Alcohol use: Yes     Alcohol/week: 1 0 standard drinks     Types: 1 Glasses of wine per week    Drug use: Never    Sexual activity: None   Other Topics Concern    None   Social History Narrative    Do you currently or have you served in the [employer]:   No      Occupation:   Vascular Surgeon      Advance directive:   Yes      Social Determinants of Health     Financial Resource Strain:     Difficulty of Paying Living Expenses:    Food Insecurity:     Worried About Running Out of Food in the Last Year:     [address] in the Last Year:    Transportation Needs:     Lack of Transportation (Medical):      Lack of Transportation (Non-Medical):    Physical Activity:     Days of Exercise per Week:     Minutes of Exercise per Session:    Stress:     Feeling of Stress :    Social Connections:     Frequency of Communication with Friends and Family:     Frequency of Social Gatherings with Friends and Family:     Attends [employer] Services:     Active Member of Clubs or Organizations:     Attends Club or Organization Meetings:     Marital Status:    Intimate Partner Violence:     Fear of Current or Ex-Partner:     Emotionally Abused:     Physically Abused:     Sexually Abused:       Current Medications:     Current Outpatient Medications   Medication Sig Dispense Refill    aspirin 81 mg chewable tablet Chew      Cholecalciferol (Dialyvite Vitamin D 5000) 125 MCG (5000 UT) capsule Take 5,000 Units by mouth daily      flecainide (TAMBOCOR) 50 mg tablet Take 1 tablet (50 mg total) by mouth 2 (two) times a day 180 tablet 3    oxazepam (SERAX) 10 mg capsule Take 1 capsule (10 mg total) by mouth 2 (two) times a day 60 capsule 5    rosuvastatin (Crestor) 5 mg tablet Take 1 tablet (5 mg total) by mouth daily 1 tab every day for chol 90 tablet 3    pantoprazole (PROTONIX) 40 mg tablet TAKE 1 TABLET BY MOUTH TWICE A DAY FOR 14 DAYS (Patient not taking: Reported on 7/26/2021) 90 tablet 1     No current facility-administered medications for this visit  Allergies:     No Known Allergies   Physical Exam:     /68   Pulse 56   Temp (!) 96 3 °F (35 7 °C)   Resp 18   Ht 5' 9" (1 753 m)   Wt 82 1 kg (181 lb)   SpO2 99%   BMI 26 73 kg/m²     Physical Exam  Constitutional:       General: He is not in acute distress  Appearance: Normal appearance  He is normal weight  He is not ill-appearing, toxic-appearing or diaphoretic  HENT:      Right Ear: Tympanic membrane normal       Left Ear: Tympanic membrane normal       Nose: Nose normal       Mouth/Throat:      Mouth: Mucous membranes are moist    Eyes:      General: No scleral icterus  Right eye: No discharge  Left eye: No discharge  Musculoskeletal:         General: No tenderness or signs of injury  Cervical back: Normal range of motion  Right lower leg: No edema  Left lower leg: No edema  Skin:     General: Skin is warm and dry  Neurological:      General: No focal deficit present  Mental Status: He is alert and oriented to person, place, and time  Sensory: No sensory deficit     Psychiatric:         Mood and Affect: Mood normal          Behavior: Behavior normal          Thought Content:  Thought content normal          Judgment: Judgment normal           Serena Fine  [facility] [place]

## 2021-08-12 ENCOUNTER — APPOINTMENT (OUTPATIENT)
Dept: LAB | Facility: HOSPITAL | Age: 66
End: 2021-08-12
Payer: COMMERCIAL

## 2021-08-12 DIAGNOSIS — E80.6 HYPERBILIRUBINEMIA: ICD-10-CM

## 2021-08-12 LAB
ALBUMIN SERPL BCP-MCNC: 3.9 G/DL (ref 3.5–5)
ALP SERPL-CCNC: 59 U/L (ref 46–116)
ALT SERPL W P-5'-P-CCNC: 29 U/L (ref 12–78)
ANION GAP SERPL CALCULATED.3IONS-SCNC: 4 MMOL/L (ref 4–13)
AST SERPL W P-5'-P-CCNC: 21 U/L (ref 5–45)
BILIRUB DIRECT SERPL-MCNC: 0.21 MG/DL (ref 0–0.2)
BILIRUB SERPL-MCNC: 1.13 MG/DL (ref 0.2–1)
BUN SERPL-MCNC: 25 MG/DL (ref 5–25)
CALCIUM SERPL-MCNC: 9.1 MG/DL (ref 8.3–10.1)
CHLORIDE SERPL-SCNC: 102 MMOL/L (ref 100–108)
CO2 SERPL-SCNC: 28 MMOL/L (ref 21–32)
CREAT SERPL-MCNC: 1.15 MG/DL (ref 0.6–1.3)
GFR SERPL CREATININE-BSD FRML MDRD: 65 ML/MIN/1.73SQ M
GLUCOSE P FAST SERPL-MCNC: 84 MG/DL (ref 65–99)
POTASSIUM SERPL-SCNC: 4.2 MMOL/L (ref 3.5–5.3)
PROT SERPL-MCNC: 7.1 G/DL (ref 6.4–8.2)
SODIUM SERPL-SCNC: 134 MMOL/L (ref 136–145)

## 2021-08-12 PROCEDURE — 36415 COLL VENOUS BLD VENIPUNCTURE: CPT

## 2021-08-12 PROCEDURE — 80053 COMPREHEN METABOLIC PANEL: CPT

## 2021-08-12 PROCEDURE — 82248 BILIRUBIN DIRECT: CPT

## 2021-08-17 DIAGNOSIS — E80.6 HYPERBILIRUBINEMIA: Primary | ICD-10-CM

## 2021-09-01 ENCOUNTER — TELEPHONE (OUTPATIENT)
Dept: GASTROENTEROLOGY | Facility: MEDICAL CENTER | Age: 66
End: 2021-09-01

## 2021-09-01 ENCOUNTER — APPOINTMENT (OUTPATIENT)
Dept: LAB | Facility: HOSPITAL | Age: 66
End: 2021-09-01
Payer: COMMERCIAL

## 2021-09-01 DIAGNOSIS — E80.6 HYPERBILIRUBINEMIA: Primary | ICD-10-CM

## 2021-09-01 LAB
BILIRUB DIRECT SERPL-MCNC: 0.16 MG/DL (ref 0–0.2)
BILIRUB SERPL-MCNC: 0.75 MG/DL (ref 0.2–1)

## 2021-09-01 PROCEDURE — 36415 COLL VENOUS BLD VENIPUNCTURE: CPT

## 2021-09-01 PROCEDURE — 82247 BILIRUBIN TOTAL: CPT

## 2021-09-01 PROCEDURE — 82248 BILIRUBIN DIRECT: CPT

## 2021-09-02 ENCOUNTER — HOSPITAL ENCOUNTER (OUTPATIENT)
Dept: GASTROENTEROLOGY | Facility: MEDICAL CENTER | Age: 66
Setting detail: OUTPATIENT SURGERY
Discharge: HOME | End: 2021-09-02
Attending: INTERNAL MEDICINE
Payer: COMMERCIAL

## 2021-09-02 ENCOUNTER — ANESTHESIA EVENT (OUTPATIENT)
Dept: GASTROENTEROLOGY | Facility: MEDICAL CENTER | Age: 66
End: 2021-09-02

## 2021-09-02 ENCOUNTER — ANESTHESIA (OUTPATIENT)
Dept: GASTROENTEROLOGY | Facility: MEDICAL CENTER | Age: 66
End: 2021-09-02

## 2021-09-02 VITALS
TEMPERATURE: 97.5 F | HEIGHT: 70 IN | WEIGHT: 171 LBS | RESPIRATION RATE: 17 BRPM | HEART RATE: 57 BPM | SYSTOLIC BLOOD PRESSURE: 112 MMHG | OXYGEN SATURATION: 100 % | DIASTOLIC BLOOD PRESSURE: 65 MMHG | BODY MASS INDEX: 24.48 KG/M2

## 2021-09-02 DIAGNOSIS — Z80.0 FAMILY HISTORY OF COLON CANCER: ICD-10-CM

## 2021-09-02 DIAGNOSIS — Z86.010 HISTORY OF COLON POLYPS: ICD-10-CM

## 2021-09-02 PROCEDURE — G0105 COLORECTAL SCRN; HI RISK IND: HCPCS | Performed by: INTERNAL MEDICINE

## 2021-09-02 RX ORDER — SODIUM CHLORIDE 9 MG/ML
125 INJECTION, SOLUTION INTRAVENOUS CONTINUOUS
Status: DISCONTINUED | OUTPATIENT
Start: 2021-09-02 | End: 2021-09-06 | Stop reason: HOSPADM

## 2021-09-02 RX ORDER — LIDOCAINE HYDROCHLORIDE 20 MG/ML
INJECTION, SOLUTION EPIDURAL; INFILTRATION; INTRACAUDAL; PERINEURAL AS NEEDED
Status: DISCONTINUED | OUTPATIENT
Start: 2021-09-02 | End: 2021-09-02

## 2021-09-02 RX ORDER — PROPOFOL 10 MG/ML
INJECTION, EMULSION INTRAVENOUS AS NEEDED
Status: DISCONTINUED | OUTPATIENT
Start: 2021-09-02 | End: 2021-09-02

## 2021-09-02 RX ADMIN — SODIUM CHLORIDE 125 ML/HR: 0.9 INJECTION, SOLUTION INTRAVENOUS at 07:26

## 2021-09-02 RX ADMIN — PROPOFOL 50 MG: 10 INJECTION, EMULSION INTRAVENOUS at 07:46

## 2021-09-02 RX ADMIN — PROPOFOL 50 MG: 10 INJECTION, EMULSION INTRAVENOUS at 07:42

## 2021-09-02 RX ADMIN — PROPOFOL 100 MG: 10 INJECTION, EMULSION INTRAVENOUS at 07:38

## 2021-09-02 RX ADMIN — PROPOFOL 50 MG: 10 INJECTION, EMULSION INTRAVENOUS at 07:50

## 2021-09-02 RX ADMIN — LIDOCAINE HYDROCHLORIDE 5 ML: 20 INJECTION, SOLUTION EPIDURAL; INFILTRATION; INTRACAUDAL; PERINEURAL at 07:38

## 2021-09-02 NOTE — ANESTHESIA PREPROCEDURE EVALUATION
Procedure:  COLONOSCOPY    Relevant Problems   CARDIO   (+) PAC (premature atrial contraction)   (+) Pure hypercholesterolemia      /RENAL   (+) Malignant neoplasm of prostate (HCC)      MUSCULOSKELETAL   (+) Pyriformis syndrome, unspecified laterality      NEURO/PSYCH   (+) History of colon polyps        Physical Exam    Airway    Mallampati score: II  TM Distance: >3 FB  Neck ROM: full     Dental       Cardiovascular  Rhythm: regular, Rate: normal, Cardiovascular exam normal    Pulmonary  Pulmonary exam normal Breath sounds clear to auscultation,     Other Findings        Anesthesia Plan  ASA Score- 2     Anesthesia Type- IV sedation with anesthesia with ASA Monitors  Additional Monitors:   Airway Plan:           Plan Factors-Exercise tolerance (METS): >4 METS  Chart reviewed  Existing labs reviewed  Patient is not a current smoker  Obstructive sleep apnea risk education given perioperatively  Induction- intravenous  Postoperative Plan-     Informed Consent- Anesthetic plan and risks discussed with patient

## 2021-09-02 NOTE — H&P
History and Physical - [facility]  Alvarado Laboy 77 y o  male MRN: [id number]                  HPI: Alvarado Laboy is a 77y o  year old male who presents for colon cancer screening  REVIEW OF SYSTEMS: Per the HPI, and otherwise unremarkable      Historical Information   Past Medical History:   Diagnosis Date    GERD (gastroesophageal reflux disease)      Past Surgical History:   Procedure Laterality Date    CHOLECYSTECTOMY      COLONOSCOPY      EGD      EGD AND COLONOSCOPY      KNEE ARTHROSCOPY Bilateral     KNEE SURGERY Left     benign tumor removed     [place] LAP,CHOLECYSTECTOMY N/A 10/9/2020    Procedure: LAPAROSCOPIC CHOLECYSTECTOMY, umbilical hernia repair;  Surgeon: Antonio Reid MD;  [facility];  Service: General    PROSTATE SURGERY      2007     Social History   Social History     Substance and Sexual Activity   Alcohol Use Yes    Alcohol/week: 1 0 standard drinks    Types: 1 Glasses of wine per week     Social History     Substance and Sexual Activity   Drug Use Never     Social History     Tobacco Use   Smoking Status Never Smoker   Smokeless Tobacco Never Used     Family History   Problem Relation Age of Onset    Lymphoma Mother     Lung cancer Father     Heart disease Brother     Other Brother         multisystem atrophy       Meds/Allergies       Current Outpatient Medications:     aspirin 81 mg chewable tablet    flecainide (TAMBOCOR) 50 mg tablet    Cholecalciferol (Dialyvite Vitamin D 5000) 125 MCG (5000 UT) capsule    oxazepam (SERAX) 10 mg capsule    pantoprazole (PROTONIX) 40 mg tablet    rosuvastatin (Crestor) 5 mg tablet    triamcinolone (KENALOG) 0 1 % cream    Current Facility-Administered Medications:     sodium chloride 0 9 % infusion, 125 mL/hr, Intravenous, Continuous, 125 mL/hr at 09/02/21 0726    No Known Allergies    Objective     /82   Pulse (!) 52   Temp 97 5 °F (36 4 °C) (Temporal)   Resp 18   Ht 5' 9 5" (1 765 m)   Wt 77 6 kg (171 lb)   SpO2 98%   BMI 24 89 kg/m²       PHYSICAL EXAM    Gen: NAD  Head: NCAT  CV: RRR  CHEST: Clear  ABD: soft, NT/ND  EXT: no edema      ASSESSMENT/PLAN:  This is a 77y o  year old male here for colonoscopy, and he is stable and optimized for his procedure

## 2021-09-25 ENCOUNTER — IMMUNIZATIONS (OUTPATIENT)
Dept: FAMILY MEDICINE CLINIC | Facility: HOSPITAL | Age: 66
End: 2021-09-25

## 2021-09-25 DIAGNOSIS — Z23 ENCOUNTER FOR IMMUNIZATION: Primary | ICD-10-CM

## 2021-09-25 PROCEDURE — 91300 SARS-COV-2 / COVID-19 MRNA VACCINE (PFIZER-BIONTECH) 30 MCG: CPT

## 2021-09-25 PROCEDURE — 0001A SARS-COV-2 / COVID-19 MRNA VACCINE (PFIZER-BIONTECH) 30 MCG: CPT

## 2021-10-12 ENCOUNTER — OFFICE VISIT (OUTPATIENT)
Dept: CARDIOLOGY CLINIC | Facility: CLINIC | Age: 66
End: 2021-10-12
Payer: COMMERCIAL

## 2021-10-12 ENCOUNTER — TELEPHONE (OUTPATIENT)
Dept: CARDIOLOGY CLINIC | Facility: CLINIC | Age: 66
End: 2021-10-12

## 2021-10-12 VITALS
WEIGHT: 180.6 LBS | HEART RATE: 56 BPM | SYSTOLIC BLOOD PRESSURE: 118 MMHG | BODY MASS INDEX: 25.86 KG/M2 | RESPIRATION RATE: 16 BRPM | HEIGHT: 70 IN | DIASTOLIC BLOOD PRESSURE: 76 MMHG

## 2021-10-12 DIAGNOSIS — E78.5 DYSLIPIDEMIA: ICD-10-CM

## 2021-10-12 DIAGNOSIS — I49.1 PAC (PREMATURE ATRIAL CONTRACTION): Primary | ICD-10-CM

## 2021-10-12 PROCEDURE — 1036F TOBACCO NON-USER: CPT | Performed by: INTERNAL MEDICINE

## 2021-10-12 PROCEDURE — 99244 OFF/OP CNSLTJ NEW/EST MOD 40: CPT | Performed by: INTERNAL MEDICINE

## 2021-10-12 PROCEDURE — 3008F BODY MASS INDEX DOCD: CPT | Performed by: INTERNAL MEDICINE

## 2021-10-12 PROCEDURE — 1160F RVW MEDS BY RX/DR IN RCRD: CPT | Performed by: INTERNAL MEDICINE

## 2021-10-12 PROCEDURE — 93000 ELECTROCARDIOGRAM COMPLETE: CPT | Performed by: INTERNAL MEDICINE

## 2021-12-08 ENCOUNTER — APPOINTMENT (OUTPATIENT)
Dept: LAB | Facility: HOSPITAL | Age: 66
End: 2021-12-08
Payer: COMMERCIAL

## 2021-12-08 DIAGNOSIS — E78.5 DYSLIPIDEMIA: ICD-10-CM

## 2021-12-08 LAB
CHOLEST SERPL-MCNC: 185 MG/DL
HDLC SERPL-MCNC: 90 MG/DL
LDLC SERPL CALC-MCNC: 75 MG/DL (ref 0–100)
TRIGL SERPL-MCNC: 102 MG/DL

## 2021-12-08 PROCEDURE — 36415 COLL VENOUS BLD VENIPUNCTURE: CPT

## 2021-12-08 PROCEDURE — 80061 LIPID PANEL: CPT

## 2021-12-15 ENCOUNTER — TELEMEDICINE (OUTPATIENT)
Dept: CARDIOLOGY CLINIC | Facility: CLINIC | Age: 66
End: 2021-12-15
Payer: COMMERCIAL

## 2021-12-15 VITALS — BODY MASS INDEX: 25.34 KG/M2 | WEIGHT: 177 LBS | HEIGHT: 70 IN

## 2021-12-15 DIAGNOSIS — E78.5 DYSLIPIDEMIA: Primary | ICD-10-CM

## 2021-12-15 DIAGNOSIS — E78.2 MIXED HYPERLIPIDEMIA: ICD-10-CM

## 2021-12-15 PROCEDURE — 99213 OFFICE O/P EST LOW 20 MIN: CPT | Performed by: INTERNAL MEDICINE

## 2021-12-15 PROCEDURE — 1160F RVW MEDS BY RX/DR IN RCRD: CPT | Performed by: INTERNAL MEDICINE

## 2021-12-15 PROCEDURE — 3008F BODY MASS INDEX DOCD: CPT | Performed by: INTERNAL MEDICINE

## 2021-12-15 PROCEDURE — 1036F TOBACCO NON-USER: CPT | Performed by: INTERNAL MEDICINE

## 2021-12-15 RX ORDER — ROSUVASTATIN CALCIUM 10 MG/1
10 TABLET, COATED ORAL DAILY
Qty: 90 TABLET | Refills: 3 | Status: SHIPPED | OUTPATIENT
Start: 2021-12-15

## 2021-12-27 ENCOUNTER — TELEPHONE (OUTPATIENT)
Dept: FAMILY MEDICINE CLINIC | Facility: CLINIC | Age: 66
End: 2021-12-27

## 2021-12-27 DIAGNOSIS — Z20.822 EXPOSURE TO COVID-19 VIRUS: Primary | ICD-10-CM

## 2021-12-27 PROCEDURE — U0005 INFEC AGEN DETEC AMPLI PROBE: HCPCS | Performed by: NURSE PRACTITIONER

## 2021-12-27 PROCEDURE — U0003 INFECTIOUS AGENT DETECTION BY NUCLEIC ACID (DNA OR RNA); SEVERE ACUTE RESPIRATORY SYNDROME CORONAVIRUS 2 (SARS-COV-2) (CORONAVIRUS DISEASE [COVID-19]), AMPLIFIED PROBE TECHNIQUE, MAKING USE OF HIGH THROUGHPUT TECHNOLOGIES AS DESCRIBED BY CMS-2020-01-R: HCPCS | Performed by: NURSE PRACTITIONER

## 2021-12-29 ENCOUNTER — OFFICE VISIT (OUTPATIENT)
Dept: PHYSICAL THERAPY | Facility: CLINIC | Age: 66
End: 2021-12-29
Payer: COMMERCIAL

## 2021-12-29 DIAGNOSIS — S76.819D: Primary | ICD-10-CM

## 2021-12-29 PROCEDURE — 97161 PT EVAL LOW COMPLEX 20 MIN: CPT | Performed by: PHYSICAL THERAPIST

## 2021-12-29 PROCEDURE — 97110 THERAPEUTIC EXERCISES: CPT | Performed by: PHYSICAL THERAPIST

## 2021-12-30 PROCEDURE — U0003 INFECTIOUS AGENT DETECTION BY NUCLEIC ACID (DNA OR RNA); SEVERE ACUTE RESPIRATORY SYNDROME CORONAVIRUS 2 (SARS-COV-2) (CORONAVIRUS DISEASE [COVID-19]), AMPLIFIED PROBE TECHNIQUE, MAKING USE OF HIGH THROUGHPUT TECHNOLOGIES AS DESCRIBED BY CMS-2020-01-R: HCPCS | Performed by: NURSE PRACTITIONER

## 2022-01-23 DIAGNOSIS — I49.1 PAC (PREMATURE ATRIAL CONTRACTION): ICD-10-CM

## 2022-01-24 RX ORDER — FLECAINIDE ACETATE 50 MG/1
50 TABLET ORAL 2 TIMES DAILY
Qty: 180 TABLET | Refills: 3 | Status: SHIPPED | OUTPATIENT
Start: 2022-01-24

## 2022-03-04 ENCOUNTER — OFFICE VISIT (OUTPATIENT)
Dept: PHYSICAL THERAPY | Facility: CLINIC | Age: 67
End: 2022-03-04
Payer: COMMERCIAL

## 2022-03-04 DIAGNOSIS — M51.36 DDD (DEGENERATIVE DISC DISEASE), LUMBAR: Primary | ICD-10-CM

## 2022-03-04 PROCEDURE — 97161 PT EVAL LOW COMPLEX 20 MIN: CPT | Performed by: PHYSICAL THERAPIST

## 2022-03-04 NOTE — PROGRESS NOTES
PT Evaluation + D/C    Today's date: 3/4/2022  Patient name: Moody Emery  : 1955  MRN: [id number]  Referring provider: Roetta Fabry, PT  Dx:   Encounter Diagnosis     ICD-10-CM    1  DDD (degenerative disc disease), lumbar  M51 36    Addendum 22: All sx's gone  Ready for D/C  Assessment  Assessment details: Moody Emery is a 74 y o  male presenting to outpatient physical therapy at [place] [address] with complaints of inferior L LBP  He presents with decreased STM and pelvic mobility with L up-slip  He had moderate pain relief with initial tx today and is expected to be able to return to all normal tasks including running within the next 4 weeks  He presented to me via direct access  He would benefit from skilled PT services in order to address these deficits and reach maximum level of function  Impairments: abnormal or restricted ROM, activity intolerance, impaired physical strength, lacks appropriate home exercise program and pain with function  Barriers to therapy: None  Understanding of Dx/Px/POC: excellent  Goals  ST  Independent with HEP in 2 weeks  2  Neutral pelvic alignment in 2 weeks    LT  Able to return to running without LBP in 4 weeks  2  No tenderness LB in 4 weeks    Plan  Patient would benefit from: skilled PT and PT eval  Planned therapy interventions: flexibility, functional ROM exercises, home exercise program, manual therapy, neuromuscular re-education, strengthening, stretching, therapeutic activities and therapeutic exercise  Frequency: 2x week  Duration in weeks: 4  Plan of Care beginning date: 3/4/2022  Plan of Care expiration date: 4/3/2022  Treatment plan discussed with: patient        Subjective Evaluation    History of Present Illness  Mechanism of injury: Pt reports having L LBP since about 2 weeks ago after sneezing  Pain is slightly less since that time and has been able to run, but has pain later    Has been running about 15 miles/week and rides his bike too  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[address] [employer]  Has some hx of lumbar pain  Recurrent probem    Quality of life: excellent    Pain  Current pain ratin  At best pain ratin  At worst pain ratin  Quality: dull ache, sharp and tight  Progression: no change    Social Support  Steps to enter house: yes  Stairs in house: yes   Lives in: multiple-level home  Lives with: spouse    Employment status: working    Diagnostic Tests  No diagnostic tests performed  Treatments  Previous treatment: physical therapy  Patient Goals  Patient goals for therapy: decreased pain, return to sport/leisure activities, independence with ADLs/IADLs and increased motion          Objective     Concurrent Complaints  Negative for night pain, disturbed sleep, bladder dysfunction and bowel dysfunction    Postural Observations  Seated posture: fair  Standing posture: good        Observations     Right Knee   Negative for effusion  Palpation   Left   Tenderness of the erector spinae  Right   No palpable tenderness to the erector spinae  Hypertonic in the rectus femoris  Cervical Spine Comments  Left erector spinae: L5      Additional Palpation Details  Slight up-slip L pelvis       Neurological Testing     Sensation     Lumbar   Left   Intact: light touch    Right   Intact: light touch    Knee   Left Knee   Intact: light touch    Right Knee   Intact: light touch     Active Range of Motion     Lumbar   Normal active range of motion    Strength/Myotome Testing     Lumbar   Left   Normal strength    Right   Normal strength    Additional Strength Details  Abdominals = 4/5    Ambulation     Ambulation: Stairs   Ascend stairs: independent  Pattern: reciprocal  Railings: without rails  Descend stairs: independent  Pattern: reciprocal  Railings: without rails  Curbs: independent    Observational Gait   Gait: within functional limits         POC EXPIRES On:  4/3/22  PRECAUTIONS: None  CO-MORBIDITES:  None  PERSONAL FACTORS:  None      Manuals HEP 3/4           Long leg traction L  3'                                                  Neuro Re-Ed                                                                                                Ther Ex    Drop L pelvis on 8" step 3/4 10" 3                                                                                                      Ther Activity                              Gait Training                              Modalities                                 '

## 2022-03-08 ENCOUNTER — OFFICE VISIT (OUTPATIENT)
Dept: PHYSICAL THERAPY | Facility: CLINIC | Age: 67
End: 2022-03-08
Payer: COMMERCIAL

## 2022-03-08 DIAGNOSIS — M51.36 DDD (DEGENERATIVE DISC DISEASE), LUMBAR: Primary | ICD-10-CM

## 2022-03-08 PROCEDURE — 97140 MANUAL THERAPY 1/> REGIONS: CPT | Performed by: PHYSICAL THERAPIST

## 2022-03-08 NOTE — PROGRESS NOTES
Daily Note     Today's date: 3/8/2022  Patient name: Shruti Gutierrez  : 1955  MRN: [id number]  Referring provider: Karen Breaux PT  Dx:   Encounter Diagnosis     ICD-10-CM    1  DDD (degenerative disc disease), lumbar  M51 36                   Subjective:  Pt reports feeling about the same  Some pain when lifting legs for tasks such as putting his pants on  No pain with running  Tried elliptical without any discomfort  Objective:  See treatment diary below      Assessment:  Pt presented to outpatient physical therapy at [place] [address] with complaints of inferior L LBP  He presents with decreased STM and pelvic mobility with L up-slip  He had moderate pain relief with initial tx, but is expected to be able to return to all normal tasks including running within the next 4 weeks  He presented to me via direct access  He will continue to benefit from skilled PT services in order to address these deficits and reach maximum level of function  Pelvic mobility was slightly better today with most restriction in R pelvic inward rotation  Mod less tightness post S/L lumbar gapping  Plan:  Continue with focus on manual tx x 1-2 weeks to increase pelvic mobility  Increase difficulty of core strengthening for HEP         POC EXPIRES On:  4/3/22  PRECAUTIONS:  None  CO-MORBIDITES:  None  PERSONAL FACTORS:  None      Manuals HEP 3/4 3/8          Long leg traction L  3' 3'          Lumbar R S/L gapping L pelvis    10'          Pelvic rotational mobs prone   5'          Sacral rocking   5'          Neuro Re-Ed                                                                                                Ther Ex    Drop L pelvis on 8" step 3/4 10" 3           Hip flexor stretching supine 3/8                                                                                          Ther Activity                              Gait Training                              Modalities                                 '

## 2022-03-16 ENCOUNTER — OFFICE VISIT (OUTPATIENT)
Dept: PHYSICAL THERAPY | Facility: CLINIC | Age: 67
End: 2022-03-16
Payer: COMMERCIAL

## 2022-03-16 DIAGNOSIS — M51.36 DDD (DEGENERATIVE DISC DISEASE), LUMBAR: Primary | ICD-10-CM

## 2022-03-16 PROCEDURE — 97140 MANUAL THERAPY 1/> REGIONS: CPT | Performed by: PHYSICAL THERAPIST

## 2022-03-16 NOTE — PROGRESS NOTES
Daily Note + D/C    Today's date: 3/16/2022  Patient name: Kit Edwards  : 1955  MRN: [id number]  Referring provider: Magalis Nayak, PT  Dx:   Encounter Diagnosis     ICD-10-CM    1  DDD (degenerative disc disease), lumbar  M51 36      Addendum 22:  Pt doing well with HEP  D/C for now  Subjective:  Pt reports feeling a little better, but still some L SI jt pain after running  No pain with running  Objective:  See treatment diary below      Assessment:  Pt presented to outpatient physical therapy at [facility] with complaints of inferior L LBP  He presents with decreased STM and pelvic mobility with L up-slip  He had complete pain relief with initial tx, but is expected to be able to return to all normal tasks including running soon with a gradual return  He presented to me via direct access  He will continue to benefit from skilled PT services in order to address these deficits and reach maximum level of function  Much less tightness post S/L lumbar gapping  Plan:  PT prn  Increase difficulty of core strengthening for HEP         POC EXPIRES On:  4/3/22  PRECAUTIONS:  None  CO-MORBIDITES:  None  PERSONAL FACTORS:  None      Manuals HEP 3/ 3/8 3/16         Long leg traction L  3' 3' 3'         Lumbar R S/L gapping L pelvis    10' 10'         Pelvic rotational mobs prone   5' 5'         Sacral rocking   5' 5'         Neuro Re-Ed     Supine marching with PPT L/R 3/16   10 ea                                                                                       Ther Ex    Drop L pelvis on 8" step 3/4 10" 3           Hip flexor stretching supine 3/8            Cross leg stretch to R 3/16   10" 5                                                                          Ther Activity                              Gait Training                              Modalities                                 '

## 2022-03-18 ENCOUNTER — CONSULT (OUTPATIENT)
Dept: NEUROSURGERY | Facility: CLINIC | Age: 67
End: 2022-03-18
Payer: COMMERCIAL

## 2022-03-18 VITALS
BODY MASS INDEX: 25.48 KG/M2 | WEIGHT: 178 LBS | TEMPERATURE: 98.1 F | HEIGHT: 70 IN | DIASTOLIC BLOOD PRESSURE: 78 MMHG | SYSTOLIC BLOOD PRESSURE: 132 MMHG | OXYGEN SATURATION: 97 % | HEART RATE: 65 BPM

## 2022-03-18 DIAGNOSIS — M54.50 CHRONIC RIGHT-SIDED LOW BACK PAIN WITHOUT SCIATICA: Primary | ICD-10-CM

## 2022-03-18 DIAGNOSIS — G89.29 CHRONIC RIGHT-SIDED LOW BACK PAIN WITHOUT SCIATICA: Primary | ICD-10-CM

## 2022-03-18 PROCEDURE — 99212 OFFICE O/P EST SF 10 MIN: CPT | Performed by: NEUROLOGICAL SURGERY

## 2022-03-18 RX ORDER — METHYLPREDNISOLONE 4 MG/1
TABLET ORAL
Qty: 1 EACH | Refills: 0 | Status: SHIPPED | OUTPATIENT
Start: 2022-03-18

## 2022-03-18 NOTE — PROGRESS NOTES
Office Note - Neurosurgery   Shruti Gutierrez 74 y o  male MRN: [id number]      Assessment:    Patient is stable  67-year-old with acute exacerbation of more chronic left lower back pain  No signs or symptoms of radiculopathy  This appears to be musculoskeletal sprain/strain  Encouraged him to continue with physical therapy  Provided him with a Medrol Dosepak and a prescription for Voltaren gel to be applied to the left lower back as needed  Typical interactions and precautions were reviewed  Reviewed the signs and symptoms of progressive lumbar radiculopathy and asked him to contact this office should any concerns arise  Otherwise, he will follow up on a p r n  basis  History, physical examination and diagnostic tests were reviewed and questions answered  Diagnosis, care plan and treatment options were discussed  The patient understand instructions and will follow up as directed  Plan:    Follow-up: prn    Problem List Items Addressed This Visit     None      Visit Diagnoses     Chronic right-sided low back pain without sciatica    -  Primary    Relevant Medications    methylPREDNISolone 4 MG tablet therapy pack    Diclofenac Sodium (VOLTAREN) 1 %          Subjective/Objective     Chief Complaint    Low back pain  HPI    67-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 which occurred after sneezing  This was responding well for physical therapy  However he recently sneezed again and had exacerbation of his pain  He is able to run and be active without much pain but flexing forward in particular produces some pain just above the left iliac crest   He denies any pain, weakness or numbness in his legs or difficulties with bowel bladder function or changing perineal sensation  At the was somewhat helpful with the pain  ROS  SUSAN personally reviewed and updated  Review of Systems   Constitutional: Positive for activity change   Appetite change: bending/reaching; is still able to run/exercise  HENT: Negative  Eyes: Negative  Respiratory: Negative  Cardiovascular: Negative  Gastrointestinal: Negative  Endocrine: Negative  Genitourinary: Negative  Musculoskeletal: Positive for back pain (left low back x3-4 weeks after a sneeze, was improving with PT until he sneezed again this am; does not radiate into legs ) and myalgias (tightness in left lower back, sometimes has trouble reaching to tie shoes; getting up in the am is difficult )  Skin: Negative  Allergic/Immunologic: Negative  Neurological: Negative  Hematological: Negative  Psychiatric/Behavioral: Negative  All other systems reviewed and are negative  Family History    Family History   Problem Relation Age of Onset    Lymphoma Mother     Lung cancer Father     Heart disease Brother     Other Brother         multisystem atrophy       Social History    Social History     Socioeconomic History    Marital status: /Civil Union     Spouse name: Not on file    Number of children: Not on file    Years of education: Not on file    Highest education level: Not on file   Occupational History    Not on file   Tobacco Use    Smoking status: Never Smoker    Smokeless tobacco: Never Used   Vaping Use    Vaping Use: Never used   Substance and Sexual Activity    Alcohol use:  Yes     Alcohol/week: 1 0 standard drink     Types: 1 Glasses of wine per week    Drug use: Never    Sexual activity: Not on file   Other Topics Concern    Not on file   Social History Narrative    Do you currently or have you served in [employer]:   No      Occupation:   Vascular Surgeon      Advance directive:   Yes      Social Determinants of Health     Financial Resource Strain: Not on file   Food Insecurity: Not on file   Transportation Needs: Not on file   Physical Activity: Not on file   Stress: Not on file   Social Connections: Not on file   Intimate Partner Violence: Not on file Housing Stability: Not on file       Past Medical History    Past Medical History:   Diagnosis Date    Colon polyp     GERD (gastroesophageal reflux disease)     Irregular heart beat     PAC       Surgical History    Past Surgical History:   Procedure Laterality Date    CHOLECYSTECTOMY      COLONOSCOPY      EGD      EGD AND COLONOSCOPY      KNEE ARTHROSCOPY Bilateral     KNEE SURGERY Left     benign tumor removed     [place] LAP,CHOLECYSTECTOMY N/A 10/9/2020    Procedure: LAPAROSCOPIC CHOLECYSTECTOMY, umbilical hernia repair;  Surgeon: Arturo Finley MD;  [facility];  Service: General    PROSTATE SURGERY      2007       Medications      Current Outpatient Medications:     aspirin 81 mg chewable tablet, Chew, Disp: , Rfl:     Cholecalciferol (Dialyvite Vitamin D 5000) 125 MCG (5000 UT) capsule, Take 5,000 Units by mouth daily  , Disp: , Rfl:     flecainide (TAMBOCOR) 50 mg tablet, Take 1 tablet (50 mg total) by mouth 2 (two) times a day, Disp: 180 tablet, Rfl: 3    oxazepam (SERAX) 10 mg capsule, TAKE 1 CAPSULE (10 MG TOTAL) BY MOUTH 2 (TWO) TIMES A DAY, Disp: 60 capsule, Rfl: 5    rosuvastatin (Crestor) 10 MG tablet, Take 1 tablet (10 mg total) by mouth daily, Disp: 90 tablet, Rfl: 3    Diclofenac Sodium (VOLTAREN) 1 %, Apply 2 g topically 4 (four) times a day, Disp: 2 g, Rfl: 0    methylPREDNISolone 4 MG tablet therapy pack, Use as directed on package, Disp: 1 each, Rfl: 0    Allergies    No Known Allergies    The following portions of the patient's history were reviewed and updated as appropriate: allergies, current medications, past family history, past medical history, past social history, past surgical history and problem list     Physical Exam    Vitals:  Blood pressure 132/78, pulse 65, temperature 98 1 °F (36 7 °C), temperature source Temporal, height 5' 9 5" (1 765 m), weight 80 7 kg (178 lb), SpO2 97 %  ,Body mass index is 25 91 kg/m²  Physical Exam  Vitals reviewed  Constitutional:       General: He is not in acute distress  Eyes:      Extraocular Movements: Extraocular movements intact  Pulmonary:      Effort: Pulmonary effort is normal  No respiratory distress  Musculoskeletal:      Comments: Nearly full range of motion on flexion-extension lumbar spine though flexion produces some pain over right PSIS  No bruising or deformity across the lower back  Skin:     General: Skin is warm and dry  Neurological:      Mental Status: He is alert and oriented to person, place, and time  Sensory: No sensory deficit  Motor: No weakness  Gait: Gait normal    Psychiatric:         Mood and Affect: Mood normal          Behavior: Behavior normal        Neurologic Exam     Mental Status   Oriented to person, place, and time

## 2022-03-19 ENCOUNTER — APPOINTMENT (EMERGENCY)
Dept: CT IMAGING | Facility: HOSPITAL | Age: 67
End: 2022-03-19
Payer: COMMERCIAL

## 2022-03-19 ENCOUNTER — HOSPITAL ENCOUNTER (EMERGENCY)
Facility: HOSPITAL | Age: 67
Discharge: HOME | End: 2022-03-19
Attending: EMERGENCY MEDICINE | Admitting: EMERGENCY MEDICINE
Payer: COMMERCIAL

## 2022-03-19 VITALS
SYSTOLIC BLOOD PRESSURE: 132 MMHG | WEIGHT: 178 LBS | OXYGEN SATURATION: 98 % | HEART RATE: 68 BPM | DIASTOLIC BLOOD PRESSURE: 84 MMHG | BODY MASS INDEX: 26.36 KG/M2 | RESPIRATION RATE: 16 BRPM | HEIGHT: 69 IN | TEMPERATURE: 97.3 F

## 2022-03-19 DIAGNOSIS — M54.9 BACK PAIN: Primary | ICD-10-CM

## 2022-03-19 PROCEDURE — 72128 CT CHEST SPINE W/O DYE: CPT

## 2022-03-19 PROCEDURE — 72131 CT LUMBAR SPINE W/O DYE: CPT

## 2022-03-19 PROCEDURE — 99284 EMERGENCY DEPT VISIT MOD MDM: CPT | Performed by: EMERGENCY MEDICINE

## 2022-03-19 PROCEDURE — 99283 EMERGENCY DEPT VISIT LOW MDM: CPT

## 2022-03-19 RX ORDER — CYCLOBENZAPRINE HCL 10 MG
10 TABLET ORAL 2 TIMES DAILY PRN
Qty: 20 TABLET | Refills: 0 | Status: SHIPPED | OUTPATIENT
Start: 2022-03-19

## 2022-03-19 NOTE — ED PROVIDER NOTES
History  Chief Complaint   Patient presents with    Hip Pain     pt reports left muscle spasm  pt states he is being treated for same with no relief  Dr Leah Stephens 24-year-old male patient came into the ED with chief complaint of left-sided hip pain for 1 day  He reported that he sneezed and the left lower back/left hip pain started yesterday morning  It was electric like sharp pain 9/10 in intensity  The pain did not radiate to the left lower leg  The pain worse with movement  However, he reported that he did elliptical exercise at the gym yesterday  No limited range of motion  Middle of the back and right side are normal, no pain  Patient is the vascular surgeon at [employer]  He is an active runner/jogger  He used to run 15 miles per week  Now patient is doing exercise at the gym regularly  He had past history of low back pain 3 years ago  He had received 1 dose of steroid injection into the back and the pain had resolved at the time  MRI lumbar spine in 2020 showed DJD with canal stenosis  The low back pain reappeared 3-4 weeks ago  He had received the physical therapy once a week for 3 weeks  He saw the neurosurgeon Dr Julianne Sellers yesterday  He was prescribed a Medrol pack and Voltaren gel for left-sided low back pain  Patient felt better, the pain decreased to 6/10 in intensity this morning  Patient had history of prostate cancer and prostatectomy was done 15 years ago  Patient is concern for metastatic disease  Denied fever, chills, nausea, vomiting, an intentional weight loss, night sweats  Prior to Admission Medications   Prescriptions Last Dose Informant Patient Reported? Taking?    Cholecalciferol (Dialyvite Vitamin D 5000) 125 MCG (5000 UT) capsule  Self Yes No   Sig: Take 5,000 Units by mouth daily     Diclofenac Sodium (VOLTAREN) 1 %   No No   Sig: Apply 2 g topically 4 (four) times a day   aspirin 81 mg chewable tablet  Self Yes No   Sig: Chew   flecainide (TAMBOCOR) 50 mg tablet  Self No No   Sig: Take 1 tablet (50 mg total) by mouth 2 (two) times a day   methylPREDNISolone 4 MG tablet therapy pack   No No   Sig: Use as directed on package   oxazepam (SERAX) 10 mg capsule  Self No No   Sig: TAKE 1 CAPSULE (10 MG TOTAL) BY MOUTH 2 (TWO) TIMES A DAY   rosuvastatin (Crestor) 10 MG tablet  Self No No   Sig: Take 1 tablet (10 mg total) by mouth daily      Facility-Administered Medications: None       Past Medical History:   Diagnosis Date    Colon polyp     GERD (gastroesophageal reflux disease)     Irregular heart beat     PAC       Past Surgical History:   Procedure Laterality Date    CHOLECYSTECTOMY      COLONOSCOPY      EGD      EGD AND COLONOSCOPY      KNEE ARTHROSCOPY Bilateral     KNEE SURGERY Left     benign tumor removed     [place] LAP,CHOLECYSTECTOMY N/A 10/9/2020    Procedure: LAPAROSCOPIC CHOLECYSTECTOMY, umbilical hernia repair;  Surgeon: Boris Valera MD;  [facility];  Service: General    PROSTATE SURGERY      2007       Family History   Problem Relation Age of Onset    Lymphoma Mother     Lung cancer Father     Heart disease Brother     Other Brother         multisystem atrophy    Colon cancer Maternal Grandmother      I have reviewed and agree with the history as documented  E-Cigarette/Vaping    E-Cigarette Use Never User      E-Cigarette/Vaping Substances     Social History     Tobacco Use    Smoking status: Never Smoker    Smokeless tobacco: Never Used   Vaping Use    Vaping Use: Never used   Substance Use Topics    Alcohol use: Yes     Alcohol/week: 1 0 standard drink     Types: 1 Glasses of wine per week    Drug use: Never        Review of Systems   Constitutional: Negative for chills, fatigue and fever  HENT: Negative for ear pain and sore throat  Eyes: Negative for pain and visual disturbance  Respiratory: Negative for cough and shortness of breath  Cardiovascular: Negative for chest pain and palpitations  Gastrointestinal: Negative for abdominal pain, nausea and vomiting  Genitourinary: Negative for dysuria and hematuria  Musculoskeletal: Positive for back pain  Negative for arthralgias  Left-sided low back pain   Skin: Negative for color change, rash and wound  Neurological: Negative for seizures, syncope and weakness  Hematological: Negative  All other systems reviewed and are negative  Physical Exam  ED Triage Vitals [03/19/22 0941]   Temperature Pulse Respirations Blood Pressure SpO2   (!) 97 3 °F (36 3 °C) 68 16 (!) 180/92 98 %      Temp Source Heart Rate Source Patient Position - Orthostatic VS BP Location FiO2 (%)   Oral Monitor Sitting Left arm --      Pain Score       --             Orthostatic Vital Signs  Vitals:    03/19/22 0941 03/19/22 1006   BP: (!) 180/92 132/84   Pulse: 68    Patient Position - Orthostatic VS: Sitting Sitting       Physical Exam  Vitals and nursing note reviewed  Constitutional:       General: He is not in acute distress  Appearance: He is well-developed  He is not toxic-appearing  HENT:      Head: Normocephalic and atraumatic  Eyes:      Extraocular Movements: Extraocular movements intact  Conjunctiva/sclera: Conjunctivae normal       Pupils: Pupils are equal, round, and reactive to light  Cardiovascular:      Rate and Rhythm: Normal rate and regular rhythm  Heart sounds: No murmur heard  Pulmonary:      Effort: Pulmonary effort is normal  No respiratory distress  Breath sounds: Normal breath sounds  Abdominal:      Palpations: Abdomen is soft  Tenderness: There is no abdominal tenderness  Musculoskeletal:         General: No swelling or tenderness  Cervical back: Neck supple  Right lower leg: No edema  Left lower leg: No edema  Comments: No swelling no lesion at the left lower back  No localized tenderness  Normal range of motion  Skin:     General: Skin is warm and dry        Coloration: Skin is not jaundiced  Findings: No bruising or lesion  Neurological:      Mental Status: He is alert and oriented to person, place, and time  ED Medications  Medications - No data to display    Diagnostic Studies  Results Reviewed     None                 CT spine thoracic & lumbar wo contrast   Final Result by Jared Stringer DO (03/19 1300)   1  Degenerative changes of the thoracolumbar spine  Stable degenerative changes in the lumbar spine when compared to the prior studies  No evidence for metastatic disease  2   Incompletely visualized degenerative changes of the left sacroiliac joint  If symptoms warrant, consider follow-up neurology consultation and/or MRI of the thoracolumbar spine with STIR weighted sequencing, to exclude microtrabecular bone injury/stress fracture  Workstation performed: [id number]               Procedures  Procedures      ED Course  ED Course as of 03/19/22 1326   Sat Mar 19, 2022   1052  CT spine thoracic and lumbar without contrast is ordered  26 Pending official CT spine reading                                       MDM  Number of Diagnoses or Management Options  Back pain  Diagnosis management comments: Musculoskeletal pain  Degenerative joint disease  Radiculopathy due to canal stenosis  Metastatic disease to the bone    Patient is not in acute distress  Vital signs are stable  CT spine thoracic and lumbar without contrast showed DJD of Lt SI joint  Pt was recommended follow up with his neurosurgeon for further management of LBP         Amount and/or Complexity of Data Reviewed  Clinical lab tests: ordered and reviewed  Tests in the radiology section of CPT®: ordered and reviewed  Tests in the medicine section of CPT®: ordered and reviewed  Review and summarize past medical records: yes    Risk of Complications, Morbidity, and/or Mortality  Presenting problems: moderate  Diagnostic procedures: moderate  Management options: moderate    Patient Progress  Patient progress: stable      Disposition  Final diagnoses:   Back pain - acute low left sided     Time reflects when diagnosis was documented in both MDM as applicable and the Disposition within this note     Time User Action Codes Description Comment    3/19/2022 12:44 PM Deliah Nely Add [M25 552] Left hip pain     3/19/2022 12:44 PM Deliah Nely Add [M54 9] Back pain     3/19/2022 12:44 PM Sissy WATSON Modify [M54 9] Back pain     3/19/2022 12:44 PM Deliah Nely Remove [M25 552] Left hip pain     3/19/2022 12:44 PM Deliah Nely Modify [M54 9] Back pain acute low left sided      ED Disposition     ED Disposition Condition Date/Time Comment    Discharge Stable Sat Mar 19, 2022 12:44 PM Governor Duggan discharge to home/self care  Follow-up Information    None         Discharge Medication List as of 3/19/2022 12:45 PM      START taking these medications    Details   cyclobenzaprine (FLEXERIL) 10 mg tablet Take 1 tablet (10 mg total) by mouth 2 (two) times a day as needed for muscle spasms, Starting Sat 3/19/2022, Normal         CONTINUE these medications which have NOT CHANGED    Details   aspirin 81 mg chewable tablet Chew, Historical Med      Cholecalciferol (Dialyvite Vitamin D 5000) 125 MCG (5000 UT) capsule Take 5,000 Units by mouth daily  , Historical Med      Diclofenac Sodium (VOLTAREN) 1 % Apply 2 g topically 4 (four) times a day, Starting Fri 3/18/2022, Normal      flecainide (TAMBOCOR) 50 mg tablet Take 1 tablet (50 mg total) by mouth 2 (two) times a day, Starting Mon 1/24/2022, Normal      methylPREDNISolone 4 MG tablet therapy pack Use as directed on package, Normal      oxazepam (SERAX) 10 mg capsule TAKE 1 CAPSULE (10 MG TOTAL) BY MOUTH 2 (TWO) TIMES A DAY, Starting Sun 11/21/2021, Normal      rosuvastatin (Crestor) 10 MG tablet Take 1 tablet (10 mg total) by mouth daily, Starting Wed 12/15/2021, Normal           No discharge procedures on file      PDMP Review       Value Time User    PDMP Reviewed  Yes 1/28/2021  3:13 PM Jay Luis MD           ED Provider  Attending physically available and evaluated Isra Perez  JOSÉ LUIS managed the patient along with the ED Attending      Electronically Signed by         Christian Styles MD  03/19/22 2868

## 2022-03-19 NOTE — ED ATTENDING ATTESTATION
3/19/2022  I, Rey Gomez MD, saw and evaluated the patient  I have discussed the patient with the resident/non-physician practitioner and agree with the resident's/non-physician practitioner's findings, Plan of Care, and MDM as documented in the resident's/non-physician practitioner's note, except where noted  All available labs and Radiology studies were reviewed  I was present for key portions of any procedure(s) performed by the resident/non-physician practitioner and I was immediately available to provide assistance  At this point I agree with the current assessment done in the Emergency Department  I have conducted an independent evaluation of this patient a history and physical is as follows:      S:  Chief Complaint   Patient presents with    Hip Pain     pt reports left muscle spasm  pt states he is being treated for same with no relief  Danielle Castellanos is a 74 y o  male who presents with chief complaint of pain in his left lower back - near but not directly over the SI joint  He reports that a 1 5 months ago he sneezed and had an immediate pain in this area  He though it might be sacroiliitis and got an appointment with physical therapy  He underwent physical therapy and had some improvement in symptoms, however he had a relapse of the pain after another sneeze  He was seen by Dr Eber Perez with neurosurgery yesterday and the conclusion was that this was likely musculoskeletal (pulled muscle or similar) rather than ddd related  However the patient does have a remote history of prostate CA with prostatectomy  Last night the pain was quite disruptive despite using ice, taking prescribed steroids and nsaids and using a topical therapy  Danielle Castellanos is very active with aerobic exercise and is generally in good health      O:  ED Triage Vitals [03/19/22 0941]   Temperature Pulse Respirations Blood Pressure SpO2   (!) 97 3 °F (36 3 °C) 68 16 (!) 180/92 98 %      Temp Source Heart Rate Source Patient Position - Orthostatic VS BP Location FiO2 (%)   Oral Monitor Sitting Left arm --      Pain Score       --         Physical Exam  Constitutional:       General: He is in acute distress (mild)  Appearance: Normal appearance  HENT:      Head: Normocephalic and atraumatic  Eyes:      Extraocular Movements: Extraocular movements intact  Pupils: Pupils are equal, round, and reactive to light  Cardiovascular:      Rate and Rhythm: Normal rate  Pulmonary:      Effort: Pulmonary effort is normal  No respiratory distress  Musculoskeletal:      Cervical back: Normal range of motion  Skin:     General: Skin is dry  Findings: No erythema or rash  Neurological:      General: No focal deficit present  Mental Status: He is alert and oriented to person, place, and time  Psychiatric:         Mood and Affect: Mood normal        A/P:  Background: 74 y o  male presents with low left sided back pain    Differential DX includes but is not limited to: muscle pull, sacroiliitis, less likely ddd, less likely metastatic disease    Plan: ct thoracolumbar spine, likely add muscle relaxant to regimen and recommend possible SI joint injection  ED Course     CT spine thoracic & lumbar wo contrast   Final Result   1  Degenerative changes of the thoracolumbar spine  Stable degenerative changes in the lumbar spine when compared to the prior studies  No evidence for metastatic disease  2   Incompletely visualized degenerative changes of the left sacroiliac joint  If symptoms warrant, consider follow-up neurology consultation and/or MRI of the thoracolumbar spine with STIR weighted sequencing, to exclude microtrabecular bone injury/stress fracture        Workstation performed: [id number]               Critical Care Time  Procedures    Time reflects when diagnosis was documented in both MDM as applicable and the Disposition within this note     Time User Action Codes Description Comment 3/19/2022 12:44 PM Estuardo Badder B Add [M25 552] Left hip pain     3/19/2022 12:44 PM Estuardo Badder B Add [M54 9] Back pain     3/19/2022 12:44 PM Estuardo Badder B Modify [M54 9] Back pain     3/19/2022 12:44 PM Kayleentoro Salazar Remove [M25 552] Left hip pain     3/19/2022 12:44 PM Kayleen Salazar Modify [M54 9] Back pain acute low left sided      ED Disposition     ED Disposition Condition Date/Time Comment    Discharge Stable Sat Mar 19, 2022 12:44 PM Dewayne Li discharge to home/self care              Follow-up Information    None

## 2022-03-22 ENCOUNTER — TRANSCRIBE ORDERS (OUTPATIENT)
Dept: NEUROSURGERY | Facility: CLINIC | Age: 67
End: 2022-03-22

## 2022-03-22 ENCOUNTER — APPOINTMENT (OUTPATIENT)
Dept: LAB | Facility: HOSPITAL | Age: 67
End: 2022-03-22
Attending: INTERNAL MEDICINE
Payer: COMMERCIAL

## 2022-03-22 DIAGNOSIS — M51.26 DISPLACEMENT OF LUMBAR INTERVERTEBRAL DISC WITHOUT MYELOPATHY: Primary | ICD-10-CM

## 2022-03-22 DIAGNOSIS — E78.5 DYSLIPIDEMIA: ICD-10-CM

## 2022-03-22 LAB
CHOLEST SERPL-MCNC: 176 MG/DL
HDLC SERPL-MCNC: 105 MG/DL
LDLC SERPL CALC-MCNC: 53 MG/DL (ref 0–100)
TRIGL SERPL-MCNC: 92 MG/DL

## 2022-03-22 PROCEDURE — 36415 COLL VENOUS BLD VENIPUNCTURE: CPT

## 2022-03-22 PROCEDURE — 80061 LIPID PANEL: CPT

## 2022-03-23 ENCOUNTER — TELEMEDICINE (OUTPATIENT)
Dept: CARDIOLOGY CLINIC | Facility: CLINIC | Age: 67
End: 2022-03-23
Payer: COMMERCIAL

## 2022-03-23 VITALS
SYSTOLIC BLOOD PRESSURE: 137 MMHG | HEART RATE: 54 BPM | DIASTOLIC BLOOD PRESSURE: 80 MMHG | BODY MASS INDEX: 26.36 KG/M2 | WEIGHT: 178 LBS | HEIGHT: 69 IN

## 2022-03-23 DIAGNOSIS — E78.5 DYSLIPIDEMIA: Primary | ICD-10-CM

## 2022-03-23 PROCEDURE — 1160F RVW MEDS BY RX/DR IN RCRD: CPT | Performed by: INTERNAL MEDICINE

## 2022-03-23 PROCEDURE — 3008F BODY MASS INDEX DOCD: CPT | Performed by: INTERNAL MEDICINE

## 2022-03-23 PROCEDURE — 99213 OFFICE O/P EST LOW 20 MIN: CPT | Performed by: INTERNAL MEDICINE

## 2022-03-23 PROCEDURE — 1036F TOBACCO NON-USER: CPT | Performed by: INTERNAL MEDICINE

## 2022-03-23 NOTE — PROGRESS NOTES
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 of premature CAD in his brother who had coronary bypass surgery and passed away in his 46s  His maternal grandfather passed away in his 62s with presumed myocardial infarction  I would consider him to be at atleast moderate to high risk for cardiovascular disease, and have recommended aggressive lifestyle modification and optimizing statin use  With recent increase in rosuvastatin, his LDL cholesterol has significantly improved, now 53 mg/dL  His HDL is increased, likely from his regular and rigors exercise regimen for I recommended continuation of his rosuvastatin and continuation of his exercise regimen  He did have a recent spastic back injury after sneezing which she states is improving  I do not think he needs an ischemic evaluation as he is asymptomatic with an excellent exercise capacity       Symptomatic PACs:  Continue following up with Dr eDlfina Sanderson  Patient has had no symptoms on Flecainide           Follow-up to lipid clinic p r n                Interval History:      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 with no prior cardiac history  He had a calcium scoring about 10 years ago which was in the 46s  Around 2015 he started on low-dose rosuvastatin  He has been taking 5 mg 3 times weekly  Subsequently, on 03/02/2021 repeat coronary artery calcium score was 516, with most of the calcium in the right coronary artery and right marginal branches  Subsequent stress echocardiogram 03/02/2021 was within normal limits    He had a very good functional capacity, walking 11 minutes on Adalberto protocol achieving 111% of maximal predicted heart rate with no chest pain or ischemic ECG/echocardiographic changes      Prior lipid panel 07/20/2021 demonstrated LDL cholesterol 75 mg/dL, HDL 78 mg/dL, triglycerides 84 mg/dL  His highest LDL level on our record was in 2017 at 115 mg/dL      Subsequently, his rosuvastatin was increased to 10 mg daily  He presents today for a telephone virtual visit  From a symptomatic standpoint he feels well without exertional chest pain, shortness of breath  He has been exercising regularly and feels well with exercise  He had a repeat lipid panel on high-dose rosuvastatin yesterday, revealing reduction in LDL cholesterol from 75-53 mg/dL, increase in HDL from  mg/dL, slight reduction triglycerides from 102-92 mg/dL  He has no adverse drug reactions of myalgias or arthralgias  Problem List Items Addressed This Visit     None          Recent Visits  No visits were found meeting these conditions  Showing recent visits within past 7 days and meeting all other requirements  Today's Visits  Date Type Provider Dept   03/23/22 Telemedicine Sabrina Chris DO [facility] [place]   Showing today's visits and meeting all other requirements  Future Appointments  No visits were found meeting these conditions    Showing future appointments within next 150 days and meeting all other requirements        I spent 5 minutes directly with the patient during this visit

## 2022-03-30 ENCOUNTER — HOSPITAL ENCOUNTER (OUTPATIENT)
Dept: MRI IMAGING | Facility: HOSPITAL | Age: 67
Discharge: HOME | End: 2022-03-30
Attending: NEUROLOGICAL SURGERY
Payer: COMMERCIAL

## 2022-03-30 DIAGNOSIS — M51.26 DISPLACEMENT OF LUMBAR INTERVERTEBRAL DISC WITHOUT MYELOPATHY: ICD-10-CM

## 2022-03-30 PROCEDURE — G1004 CDSM NDSC: HCPCS

## 2022-03-30 PROCEDURE — 72148 MRI LUMBAR SPINE W/O DYE: CPT

## 2022-07-11 ENCOUNTER — OFFICE VISIT (OUTPATIENT)
Dept: OBGYN CLINIC | Facility: OTHER | Age: 67
End: 2022-07-11
Payer: COMMERCIAL

## 2022-07-11 ENCOUNTER — APPOINTMENT (OUTPATIENT)
Dept: RADIOLOGY | Facility: OTHER | Age: 67
End: 2022-07-11
Payer: COMMERCIAL

## 2022-07-11 VITALS
HEIGHT: 69 IN | SYSTOLIC BLOOD PRESSURE: 135 MMHG | DIASTOLIC BLOOD PRESSURE: 75 MMHG | HEART RATE: 86 BPM | BODY MASS INDEX: 26.45 KG/M2 | WEIGHT: 178.6 LBS

## 2022-07-11 DIAGNOSIS — M25.561 RIGHT KNEE PAIN, UNSPECIFIED CHRONICITY: ICD-10-CM

## 2022-07-11 DIAGNOSIS — M17.11 PRIMARY OSTEOARTHRITIS OF RIGHT KNEE: Primary | ICD-10-CM

## 2022-07-11 DIAGNOSIS — R52 PAIN: ICD-10-CM

## 2022-07-11 DIAGNOSIS — Z01.89 ENCOUNTER FOR LOWER EXTREMITY COMPARISON IMAGING STUDY: ICD-10-CM

## 2022-07-11 PROCEDURE — 1160F RVW MEDS BY RX/DR IN RCRD: CPT | Performed by: ORTHOPAEDIC SURGERY

## 2022-07-11 PROCEDURE — 73562 X-RAY EXAM OF KNEE 3: CPT

## 2022-07-11 PROCEDURE — 20610 DRAIN/INJ JOINT/BURSA W/O US: CPT | Performed by: ORTHOPAEDIC SURGERY

## 2022-07-11 PROCEDURE — 73564 X-RAY EXAM KNEE 4 OR MORE: CPT

## 2022-07-11 PROCEDURE — 99214 OFFICE O/P EST MOD 30 MIN: CPT | Performed by: ORTHOPAEDIC SURGERY

## 2022-07-11 RX ORDER — BETAMETHASONE SODIUM PHOSPHATE AND BETAMETHASONE ACETATE 3; 3 MG/ML; MG/ML
6 INJECTION, SUSPENSION INTRA-ARTICULAR; INTRALESIONAL; INTRAMUSCULAR; SOFT TISSUE
Status: COMPLETED | OUTPATIENT
Start: 2022-07-11 | End: 2022-07-11

## 2022-07-11 RX ORDER — BUPIVACAINE HYDROCHLORIDE 2.5 MG/ML
2 INJECTION, SOLUTION INFILTRATION; PERINEURAL
Status: COMPLETED | OUTPATIENT
Start: 2022-07-11 | End: 2022-07-11

## 2022-07-11 RX ADMIN — BETAMETHASONE SODIUM PHOSPHATE AND BETAMETHASONE ACETATE 6 MG: 3; 3 INJECTION, SUSPENSION INTRA-ARTICULAR; INTRALESIONAL; INTRAMUSCULAR; SOFT TISSUE at 14:22

## 2022-07-11 RX ADMIN — BUPIVACAINE HYDROCHLORIDE 2 ML: 2.5 INJECTION, SOLUTION INFILTRATION; PERINEURAL at 14:22

## 2022-07-11 NOTE — PROGRESS NOTES
Assessment  Diagnoses and all orders for this visit:    Primary osteoarthritis of right knee - mild  -     Ambulatory Referral to Physical Therapy; Future      Discussion and Plan:    I discussed with Kalpana Richey that his signs and symptoms are consistent with mild right knee osteoarthritis likely secondary to his previous partial medial meniscectomy  X-rays were reviewed in the office today  Treatment options were discussed in the form of formal therapy and a steroid injection  Patient was agreeable to this  A referral was provided to formal therapy to work on quadriceps strengthening  He consented and underwent a right knee steroid injection in the office today without any complications  Post injection instructions were provided  Patient may return to running based off of symptoms  He was advised to avoid running for the day 3-4 days secondary to the steroid injection  Patient verbalized understanding of this  We did discuss a possible MRI in the future if he does not respond well to the above stated plan  Patient may follow up with me as needed  Large joint arthrocentesis: R knee  Universal Protocol:  Consent: Verbal consent obtained  Consent given by: patient  Patient identity confirmed: verbally with patient    Supporting Documentation  Indications: pain   Procedure Details  Location: knee - R knee  Preparation: Patient was prepped and draped in the usual sterile fashion  Needle size: 22 G  Ultrasound guidance: no  Medications administered: 2 mL bupivacaine 0 25 %; 6 mg betamethasone acetate-betamethasone sodium phosphate 6 (3-3) mg/mL    Patient tolerance: patient tolerated the procedure well with no immediate complications  Dressing:  Sterile dressing applied        Subjective:   Patient ID: Governor Urban is a 74 y o  male      HPI  58-year-old male who presents to the office today for evaluation of right knee pain  He states this has been ongoing for a few months  He denies any known injury or trauma   He notes pain globally about the knee which is increased with prolonged standing, knee flexion and ambulation  Patient states he is a avid runner and has been running for years  He has discontinued running over the past 3 weeks  He does also cycle  He denies any locking or catching  He has been using Voltaren Gel OTC for pain  He has also been using Advil as needed  Patient states he does have a history of medial mensicus repair performed appx 20-25 years ago with [facility]  The following portions of the patient's history were reviewed and updated as appropriate: allergies, current medications, past family history, past medical history, past social history, past surgical history and problem list     Review of Systems   Constitutional: Negative for chills and fever  HENT: Negative for drooling and sneezing  Eyes: Negative for redness  Respiratory: Negative for cough and wheezing  Gastrointestinal: Negative for nausea and vomiting  Musculoskeletal: Positive for arthralgias  Negative for joint swelling and myalgias  Neurological: Negative for weakness and numbness  Psychiatric/Behavioral: Negative for behavioral problems  The patient is not nervous/anxious  Objective:  /75   Pulse 86   Ht 5' 9" (1 753 m)   Wt 81 kg (178 lb 9 6 oz)   BMI 26 37 kg/m²       Right Knee Exam     Tenderness   The patient is experiencing no tenderness  Range of Motion   Extension: 0   Flexion: 120     Tests   Varus: negative Valgus: negative    Other   Erythema: absent  Sensation: normal  Pulse: present  Swelling: none  Effusion: no effusion present            Physical Exam  Constitutional:       Appearance: He is well-developed  HENT:      Head: Normocephalic and atraumatic  Eyes:      General:         Right eye: No discharge  Left eye: No discharge  Conjunctiva/sclera: Conjunctivae normal    Cardiovascular:      Rate and Rhythm: Normal rate     Pulmonary:      Effort: Pulmonary effort is normal  No respiratory distress  Musculoskeletal:      Cervical back: Normal range of motion and neck supple  Right knee: No effusion  Comments: As noted in HPI   Skin:     General: Skin is warm and dry  Neurological:      Mental Status: He is alert and oriented to person, place, and time  Psychiatric:         Behavior: Behavior normal          Thought Content: Thought content normal          Judgment: Judgment normal            I have personally reviewed pertinent films in PACS and my interpretation is as follows  X-rays right knee performed in the office today demonstrate very mild right knee osteoarthritis as manifested by mild joint space narrowing of the medial compartment      Scribe Attestation    I,:  Shira Dubois MA am acting as a scribe while in the presence of the attending physician :       I,:  Gold Reza MD personally performed the services described in this documentation    as scribed in my presence :

## 2022-07-11 NOTE — PATIENT INSTRUCTIONS

## 2022-07-20 ENCOUNTER — EVALUATION (OUTPATIENT)
Dept: PHYSICAL THERAPY | Facility: CLINIC | Age: 67
End: 2022-07-20
Payer: COMMERCIAL

## 2022-07-20 DIAGNOSIS — M17.11 PRIMARY OSTEOARTHRITIS OF RIGHT KNEE: ICD-10-CM

## 2022-07-20 PROCEDURE — 97140 MANUAL THERAPY 1/> REGIONS: CPT | Performed by: PHYSICAL THERAPIST

## 2022-07-20 PROCEDURE — 97161 PT EVAL LOW COMPLEX 20 MIN: CPT | Performed by: PHYSICAL THERAPIST

## 2022-07-20 NOTE — PROGRESS NOTES
PT Evaluation + D/C    Today's date: 2022  Patient name: Shruti Gutierrez  : 1955  MRN: [id number]  Referring provider: Chace Jose  Dx:   Encounter Diagnosis     ICD-10-CM    1  Primary osteoarthritis of right knee  M17 11 Ambulatory Referral to Physical Therapy                  Assessment  Assessment details: Shruti Gutierrez is a 74 y o  male presenting to outpatient physical therapy at [facility] with complaints of R knee pain  He presents with decreased quadriceps strength, limited R rectus femoris flexibility, decreased tolerance to activity and decreased functional mobility due to Primary osteoarthritis of right knee  He has much less pain since his R knee injection last week  He would benefit from skilled PT services in order to address these deficits and reach maximum level of function  Thank you for the referral!  Impairments: abnormal or restricted ROM, activity intolerance, impaired physical strength, lacks appropriate home exercise program and pain with function  Barriers to therapy: None  Understanding of Dx/Px/POC: excellent  Goals  ST  Independent with HEP in 2 weeks  2  Increase R rectus femoris flexibility to WNL in 2 weeks     LT  Achieve FOTO score of 65/100 in 4 weeks   2  Able to run x 3 miles without R knee pain in 4 weeks  3    Strength R quadriceps = 5/5 in 4 weeks      Plan  Patient would benefit from: skilled PT and PT eval  Planned modality interventions: cryotherapy  Other planned modality interventions: Laser  Planned therapy interventions: abdominal trunk stabilization, ADL retraining, balance/weight bearing training, flexibility, functional ROM exercises, home exercise program, joint mobilization, manual therapy, neuromuscular re-education, strengthening, stretching, therapeutic activities and therapeutic exercise  Frequency: 2x week  Duration in weeks: 4  Plan of Care beginning date: 2022  Plan of Care expiration date: 2022  Treatment plan discussed with: patient        Subjective Evaluation    History of Present Illness  Mechanism of injury: Pt reports having R knee pain since about 3 months ago  He denies any known injury or trauma  He notes pain globally about the knee which is increased with prolonged standing, knee flexion and ambulation  He is an avid runner  He has discontinued running over the past 3 weeks and after having a cortisone injection last week the pain has been almost entirely eliminated  He also cycles  He denies any locking or catching  He has been using Voltaren Gel OTC and Advil as needed  Patient reports a history of medial mensicus repair performed appx 20-25 years ago with [facility]  Not a recurrent problem   Quality of life: good    Pain  Current pain ratin  At best pain ratin  At worst pain ratin  Quality: tight  Relieving factors: rest  Aggravating factors: running  Progression: improved    Social Support  Steps to enter house: yes  Stairs in house: yes   Lives in: multiple-level home  Lives with: spouse    Employment status: working  Treatments  Previous treatment: injection treatment and medication  Current treatment: medication  Patient Goals  Patient goals for therapy: increased strength, return to sport/leisure activities, increased motion and decreased pain          Objective     Observations     Right Knee   Negative for effusion  Palpation     Additional Palpation Details  Mod tightness R H/S, severe R rectus femoris  Tenderness     Right Knee   Tenderness in the ITB  No tenderness in the lateral joint line, medial joint line and patellar tendon       Neurological Testing     Sensation     Knee   Left Knee   Intact: light touch    Right Knee   Intact: light touch     Active Range of Motion   Left Knee   Normal active range of motion    Right Knee   Normal active range of motion    Strength/Myotome Testing     Left Knee   Flexion: 5  Extension: 5  Quadriceps contraction: good    Right Knee   Flexion: 5  Extension: 4+  Quadriceps contraction: good    Ambulation     Ambulation: Stairs   Ascend stairs: independent  Pattern: reciprocal  Railings: without rails  Descend stairs: independent  Pattern: reciprocal  Railings: without rails  Curbs: independent        POC EXPIRES On:  8/19/22  PRECAUTIONS:  None  CO-MORBIDITES:  None  PERSONAL FACTORS:  None      Manuals HEP 7/20           TFM R patellar tendon  8'                                                  Neuro Re-Ed                                                                                                Ther Ex    Strap R rectus femoris stretch prone 7/20 20" 5           Dynamic leg swings flex/ext 7/20 Warm up 20                                                                                         Ther Activity                              Gait Training                              Modalities

## 2022-10-23 DIAGNOSIS — E78.00 PURE HYPERCHOLESTEROLEMIA: ICD-10-CM

## 2022-10-23 DIAGNOSIS — M25.50 ARTHRALGIA, UNSPECIFIED JOINT: ICD-10-CM

## 2022-10-23 DIAGNOSIS — Z79.899 ENCOUNTER FOR LONG-TERM (CURRENT) USE OF MEDICATIONS: ICD-10-CM

## 2022-10-23 DIAGNOSIS — E80.6 HYPERBILIRUBINEMIA: ICD-10-CM

## 2022-10-23 DIAGNOSIS — C61 MALIGNANT NEOPLASM OF PROSTATE (HCC): ICD-10-CM

## 2022-10-23 DIAGNOSIS — E55.9 VITAMIN D INSUFFICIENCY: ICD-10-CM

## 2022-10-23 DIAGNOSIS — Z11.59 NEED FOR HEPATITIS C SCREENING TEST: Primary | ICD-10-CM

## 2022-11-01 ENCOUNTER — APPOINTMENT (OUTPATIENT)
Dept: LAB | Facility: CLINIC | Age: 67
End: 2022-11-01

## 2022-11-01 DIAGNOSIS — E78.00 PURE HYPERCHOLESTEROLEMIA: ICD-10-CM

## 2022-11-01 DIAGNOSIS — M25.50 ARTHRALGIA, UNSPECIFIED JOINT: ICD-10-CM

## 2022-11-01 DIAGNOSIS — Z11.59 NEED FOR HEPATITIS C SCREENING TEST: ICD-10-CM

## 2022-11-01 DIAGNOSIS — Z79.899 ENCOUNTER FOR LONG-TERM (CURRENT) USE OF MEDICATIONS: ICD-10-CM

## 2022-11-01 DIAGNOSIS — E55.9 VITAMIN D INSUFFICIENCY: ICD-10-CM

## 2022-11-01 DIAGNOSIS — C61 MALIGNANT NEOPLASM OF PROSTATE (HCC): ICD-10-CM

## 2022-11-01 LAB
25(OH)D3 SERPL-MCNC: 34.9 NG/ML (ref 30–100)
ALBUMIN SERPL BCP-MCNC: 3.6 G/DL (ref 3.5–5)
ALP SERPL-CCNC: 59 U/L (ref 46–116)
ALT SERPL W P-5'-P-CCNC: 25 U/L (ref 12–78)
ANION GAP SERPL CALCULATED.3IONS-SCNC: 5 MMOL/L (ref 4–13)
AST SERPL W P-5'-P-CCNC: 16 U/L (ref 5–45)
BILIRUB SERPL-MCNC: 0.85 MG/DL (ref 0.2–1)
BILIRUB UR QL STRIP: NEGATIVE
BUN SERPL-MCNC: 18 MG/DL (ref 5–25)
CALCIUM SERPL-MCNC: 9.2 MG/DL (ref 8.3–10.1)
CHLORIDE SERPL-SCNC: 103 MMOL/L (ref 96–108)
CHOLEST SERPL-MCNC: 182 MG/DL
CLARITY UR: CLEAR
CO2 SERPL-SCNC: 28 MMOL/L (ref 21–32)
COLOR UR: COLORLESS
CREAT SERPL-MCNC: 1.34 MG/DL (ref 0.6–1.3)
CRP SERPL QL: 4.7 MG/L
ERYTHROCYTE [DISTWIDTH] IN BLOOD BY AUTOMATED COUNT: 12 % (ref 11.6–15.1)
ERYTHROCYTE [SEDIMENTATION RATE] IN BLOOD: 6 MM/HOUR (ref 0–19)
GFR SERPL CREATININE-BSD FRML MDRD: 53 ML/MIN/1.73SQ M
GLUCOSE P FAST SERPL-MCNC: 107 MG/DL (ref 65–99)
GLUCOSE UR STRIP-MCNC: NEGATIVE MG/DL
HCT VFR BLD AUTO: 43.9 % (ref 36.5–49.3)
HCV AB SER QL: NORMAL
HDLC SERPL-MCNC: 82 MG/DL
HGB BLD-MCNC: 14.7 G/DL (ref 12–17)
HGB UR QL STRIP.AUTO: NEGATIVE
KETONES UR STRIP-MCNC: NEGATIVE MG/DL
LDLC SERPL CALC-MCNC: 81 MG/DL (ref 0–100)
LEUKOCYTE ESTERASE UR QL STRIP: NEGATIVE
MCH RBC QN AUTO: 29.8 PG (ref 26.8–34.3)
MCHC RBC AUTO-ENTMCNC: 33.5 G/DL (ref 31.4–37.4)
MCV RBC AUTO: 89 FL (ref 82–98)
NITRITE UR QL STRIP: NEGATIVE
NONHDLC SERPL-MCNC: 100 MG/DL
PH UR STRIP.AUTO: 7 [PH]
PLATELET # BLD AUTO: 253 THOUSANDS/UL (ref 149–390)
PMV BLD AUTO: 9.1 FL (ref 8.9–12.7)
POTASSIUM SERPL-SCNC: 3.8 MMOL/L (ref 3.5–5.3)
PROT SERPL-MCNC: 7.4 G/DL (ref 6.4–8.4)
PROT UR STRIP-MCNC: NEGATIVE MG/DL
PSA SERPL-MCNC: <0.1 NG/ML (ref 0–4)
RBC # BLD AUTO: 4.94 MILLION/UL (ref 3.88–5.62)
SODIUM SERPL-SCNC: 136 MMOL/L (ref 135–147)
SP GR UR STRIP.AUTO: 1.01 (ref 1–1.03)
TRIGL SERPL-MCNC: 96 MG/DL
TSH SERPL DL<=0.05 MIU/L-ACNC: 1.95 UIU/ML (ref 0.45–4.5)
UROBILINOGEN UR STRIP-ACNC: <2 MG/DL
WBC # BLD AUTO: 5.73 THOUSAND/UL (ref 4.31–10.16)

## 2022-11-03 ENCOUNTER — OFFICE VISIT (OUTPATIENT)
Dept: FAMILY MEDICINE CLINIC | Facility: CLINIC | Age: 67
End: 2022-11-03

## 2022-11-03 VITALS
HEART RATE: 60 BPM | WEIGHT: 184.8 LBS | OXYGEN SATURATION: 98 % | HEIGHT: 69 IN | RESPIRATION RATE: 18 BRPM | DIASTOLIC BLOOD PRESSURE: 80 MMHG | BODY MASS INDEX: 27.37 KG/M2 | TEMPERATURE: 97.1 F | SYSTOLIC BLOOD PRESSURE: 114 MMHG

## 2022-11-03 DIAGNOSIS — Z23 ENCOUNTER FOR IMMUNIZATION: ICD-10-CM

## 2022-11-03 DIAGNOSIS — Z00.00 ANNUAL PHYSICAL EXAM: ICD-10-CM

## 2022-11-03 DIAGNOSIS — Z85.46 PERSONAL HISTORY OF MALIGNANT NEOPLASM OF PROSTATE: ICD-10-CM

## 2022-11-03 DIAGNOSIS — Z79.899 ENCOUNTER FOR LONG-TERM (CURRENT) USE OF MEDICATIONS: ICD-10-CM

## 2022-11-03 DIAGNOSIS — E78.00 PURE HYPERCHOLESTEROLEMIA: Primary | ICD-10-CM

## 2022-11-03 DIAGNOSIS — Z00.00 WELLNESS EXAMINATION: ICD-10-CM

## 2022-11-03 DIAGNOSIS — K30 FUNCTIONAL DYSPEPSIA: ICD-10-CM

## 2022-11-03 NOTE — PROGRESS NOTES
Assessment/Plan:  69 y o male, in NAD, had dental work few days ago, and bit lip  Will get better  Runs and bikes frequently --in good shape   On oxazepam 10 mg BID for functional dyspepsia --works well! Recent bout of pruritis ani - saw colo-rectal and all ok  Consider Lotrisone cr if recurs  -- 1 or 2 application ONLY  Cardiac calcium score: Up from 50 to 500, and he "wonders" value of that? Having no cardiac issues  Immunizations all reviewed  Need Prevnar-20, and possibly Zoster vac  (had one shot)    Annual Physical Exam -- done  BMI Counseling: Body mass index is 27 29 kg/m²  The BMI is above normal  Nutrition recommendations include decreasing portion sizes, encouraging healthy choices of fruits and vegetables, decreasing fast food intake, consuming healthier snacks, limiting drinks that contain sugar, moderation in carbohydrate intake, increasing intake of lean protein and reducing intake of saturated and trans fat  Exercise recommendations include moderate physical activity 150 minutes/week and exercising 3-5 times per week  No pharmacotherapy was ordered  Rationale for BMI follow-up plan is due to patient being overweight or obese  Depression Screening and Follow-up Plan: Patient was screened for depression during today's encounter  They screened negative with a PHQ-2 score of 0          1  Pure hypercholesterolemia  Comments:  Doing well on Crestor TIW a0 mg -- reviewed LP in detail    2  Encounter for long-term (current) use of medications  Comments: All meds reviewed  3  Functional dyspepsia  Comments: The Serax BID is VERY effective  4  Encounter for immunization  Comments:  Needs Prevnar-20 -->will come in in about 2 wks  May need Shringix (had only one dose??? Checking?)    5  Annual physical exam  Comments:  done today    6  Personal history of malignant neoplasm of prostate  Comments:  about 15 yrs ago, and PSA remains <0 01 for yrs       7  Wellness examination Subjective:      Patient ID: Rodney Muñoz is a 69 y o  male  HPI    The following portions of the patient's history were reviewed and updated as appropriate: He  has a past medical history of Colon polyp, GERD (gastroesophageal reflux disease), and Irregular heart beat  He   Patient Active Problem List    Diagnosis Date Noted   • Primary osteoarthritis of right knee 07/11/2022   • Malignant neoplasm of prostate ([address] ) 07/26/2021   • Subacromial impingement of left shoulder 03/01/2021   • Pure hypercholesterolemia 01/27/2021   • Family history of coronary artery disease 01/27/2021   • Nausea 11/23/2020   • Burping 11/23/2020   • Family history of colon cancer 11/23/2020   • History of colon polyps 11/23/2020   • Displacement of lumbar intervertebral disc without myelopathy    • Pyriformis syndrome, unspecified laterality 01/31/2020   • Lumbar radiculitis 01/31/2020   • PAC (premature atrial contraction) 11/22/2019     He  has a past surgical history that includes Prostate surgery; Knee arthroscopy (Bilateral); Knee surgery (Left); Colonoscopy; EGD AND COLONOSCOPY; EGD; pr lap,cholecystectomy (N/A, 10/9/2020); and Cholecystectomy  His family history includes Colon cancer in his maternal grandmother; Heart disease in his brother; Lung cancer in his father; Lymphoma in his mother; Other in his brother  He  reports that he has never smoked  He has never used smokeless tobacco  He reports current alcohol use of about 1 0 standard drink of alcohol per week  He reports that he does not use drugs    Current Outpatient Medications   Medication Sig Dispense Refill   • aspirin 81 mg chewable tablet Chew     • Cholecalciferol (Dialyvite Vitamin D 5000) 125 MCG (5000 UT) capsule Take 5,000 Units by mouth daily       • flecainide (TAMBOCOR) 50 mg tablet Take 1 tablet (50 mg total) by mouth 2 (two) times a day 180 tablet 3   • oxazepam (SERAX) 10 mg capsule TAKE 1 CAPSULE (10 MG TOTAL) BY MOUTH 2 (TWO) TIMES A DAY 60 capsule 5   • rosuvastatin (Crestor) 10 MG tablet Take 1 tablet (10 mg total) by mouth daily (Patient taking differently: Take 10 mg by mouth daily Monday , wednesday and Friday) 90 tablet 3     No current facility-administered medications for this visit  Current Outpatient Medications on File Prior to Visit   Medication Sig   • aspirin 81 mg chewable tablet Chew   • Cholecalciferol (Dialyvite Vitamin D 5000) 125 MCG (5000 UT) capsule Take 5,000 Units by mouth daily     • flecainide (TAMBOCOR) 50 mg tablet Take 1 tablet (50 mg total) by mouth 2 (two) times a day   • oxazepam (SERAX) 10 mg capsule TAKE 1 CAPSULE (10 MG TOTAL) BY MOUTH 2 (TWO) TIMES A DAY   • rosuvastatin (Crestor) 10 MG tablet Take 1 tablet (10 mg total) by mouth daily (Patient taking differently: Take 10 mg by mouth daily Monday , wednesday and Friday)     No current facility-administered medications on file prior to visit  He has No Known Allergies       Review of Systems   Constitutional: Negative for activity change, appetite change, chills, diaphoresis, fatigue, fever and unexpected weight change  HENT: Negative for congestion, dental problem, drooling, ear discharge, ear pain, facial swelling, mouth sores, nosebleeds, postnasal drip, rhinorrhea, trouble swallowing and voice change  Eyes: Negative for photophobia, pain, discharge, redness, itching and visual disturbance  Respiratory: Negative for apnea, cough, choking, chest tightness and shortness of breath  Cardiovascular: Negative for chest pain and leg swelling  Gastrointestinal: Negative for abdominal distention, abdominal pain, constipation, diarrhea and nausea  Endocrine: Negative for polydipsia, polyphagia and polyuria  Genitourinary: Negative for decreased urine volume, difficulty urinating, dysuria, enuresis and hematuria  Musculoskeletal: Negative for arthralgias, back pain, gait problem and joint swelling     Skin: Negative for color change, pallor, rash and wound  Allergic/Immunologic: Negative for immunocompromised state  Neurological: Negative for dizziness, seizures, syncope, facial asymmetry, speech difficulty, light-headedness and headaches  Hematological: Negative for adenopathy  Psychiatric/Behavioral: Negative for agitation, behavioral problems, confusion and decreased concentration  Objective:      /80 (BP Location: Right arm, Patient Position: Sitting, Cuff Size: Standard)   Pulse 60   Temp (!) 97 1 °F (36 2 °C) (Temporal)   Resp 18   Ht 5' 9" (1 753 m)   Wt 83 8 kg (184 lb 12 8 oz)   SpO2 98%   BMI 27 29 kg/m²          Physical Exam  Constitutional:       General: He is not in acute distress  Appearance: Normal appearance  He is normal weight  He is not ill-appearing, toxic-appearing or diaphoretic  HENT:      Right Ear: There is no impacted cerumen  Left Ear: There is no impacted cerumen  Nose: No congestion or rhinorrhea  Mouth/Throat:      Mouth: Mucous membranes are moist       Pharynx: No posterior oropharyngeal erythema  Comments: Bit lower L lip few times, after lip numb from dental cap work  Eyes:      General:         Right eye: No discharge  Left eye: No discharge  Extraocular Movements: Extraocular movements intact  Pupils: Pupils are equal, round, and reactive to light  Cardiovascular:      Rate and Rhythm: Normal rate and regular rhythm  Pulses: Normal pulses  Heart sounds: Normal heart sounds  No murmur heard  No friction rub  Pulmonary:      Effort: Pulmonary effort is normal  No respiratory distress  Breath sounds: Normal breath sounds  No rhonchi or rales  Chest:      Chest wall: No tenderness  Abdominal:      General: Abdomen is flat  There is no distension  Palpations: Abdomen is soft  There is no mass  Tenderness: There is no abdominal tenderness  There is no right CVA tenderness, left CVA tenderness, guarding or rebound  Hernia: No hernia is present  Musculoskeletal:         General: No tenderness  Right lower leg: No edema  Left lower leg: No edema  Skin:     Coloration: Skin is not pale  Findings: No erythema, lesion or rash  Neurological:      General: No focal deficit present  Mental Status: He is alert and oriented to person, place, and time  Mental status is at baseline  Sensory: No sensory deficit  Coordination: Coordination normal       Gait: Gait normal       Deep Tendon Reflexes: Reflexes normal    Psychiatric:         Mood and Affect: Mood normal          Behavior: Behavior normal          Thought Content: Thought content normal          Judgment: Judgment normal          Pt seen from 5:05pm to 6:00 pm and all the issues above thoroughly addressed and veted  Pt doing very well Very exercised  This time was spent reviewing previous records, reviewing previous laboratory and other tests, taking history from patient, examination of patient, discussion of prognosis and treatment, ordering laboratory tests, ordering medications, and completion of the medical record

## 2022-12-02 DIAGNOSIS — K30 FUNCTIONAL DYSPEPSIA: ICD-10-CM

## 2022-12-02 RX ORDER — OXAZEPAM 10 MG
10 CAPSULE ORAL 2 TIMES DAILY
Qty: 180 CAPSULE | Refills: 3 | Status: SHIPPED | OUTPATIENT
Start: 2022-12-02

## 2022-12-07 RX ORDER — OXAZEPAM 10 MG
10 CAPSULE ORAL 2 TIMES DAILY
Qty: 60 CAPSULE | Refills: 0 | OUTPATIENT
Start: 2022-12-07

## 2023-01-05 DIAGNOSIS — E78.2 MIXED HYPERLIPIDEMIA: ICD-10-CM

## 2023-01-05 RX ORDER — ROSUVASTATIN CALCIUM 10 MG/1
TABLET, COATED ORAL
Qty: 90 TABLET | Refills: 3 | Status: SHIPPED | OUTPATIENT
Start: 2023-01-05

## 2023-04-03 DIAGNOSIS — I49.1 PAC (PREMATURE ATRIAL CONTRACTION): ICD-10-CM

## 2023-04-04 RX ORDER — FLECAINIDE ACETATE 50 MG/1
TABLET ORAL
Qty: 180 TABLET | Refills: 3 | Status: SHIPPED | OUTPATIENT
Start: 2023-04-04

## 2023-05-31 DIAGNOSIS — K30 FUNCTIONAL DYSPEPSIA: ICD-10-CM

## 2023-05-31 RX ORDER — OXAZEPAM 10 MG
10 CAPSULE ORAL 2 TIMES DAILY
Qty: 180 CAPSULE | Refills: 3 | Status: SHIPPED | OUTPATIENT
Start: 2023-05-31

## 2023-06-02 ENCOUNTER — OFFICE VISIT (OUTPATIENT)
Dept: PHYSICAL THERAPY | Facility: CLINIC | Age: 68
End: 2023-06-02

## 2023-06-02 DIAGNOSIS — S46.211D BICEPS STRAIN, RIGHT, SUBSEQUENT ENCOUNTER: ICD-10-CM

## 2023-06-02 DIAGNOSIS — S56.912D FOREARM STRAIN, LEFT, SUBSEQUENT ENCOUNTER: Primary | ICD-10-CM

## 2023-06-02 NOTE — PROGRESS NOTES
PT Evaluation     Today's date: 2023  Patient name: Quita Patel  : 1955  MRN: [id number]  Referring provider: Merna Mccabe PT  Dx:   Encounter Diagnosis     ICD-10-CM    1  Forearm strain, left, subsequent encounter  S56 912D       2  Biceps strain, right, subsequent encounter  S46 211D           Start Time: 1345  Stop Time: 1430  Total time in clinic (min): 45 minutes    Assessment  Assessment details: Quita Patel is a 69 y o  male presenting to outpatient physical therapy at [place] [address] with complaints of R arm and forearm pain  He presents with decreased range of motion, decreased strength, limited flexibility, decreased tolerance to activity and decreased functional mobility due to R brachialis and pronator teres strains  He would benefit from skilled PT services in order to address these deficits and reach maximum level of function  He presented to me via direct access  Impairments: abnormal or restricted ROM, activity intolerance, impaired physical strength, lacks appropriate home exercise program and pain with function  Barriers to therapy: None  Understanding of Dx/Px/POC: excellent  Goals  ST  Independent with HEP in 2 weeks  2  Increase R UE AROM to WNL all motions in 3 weeks     LT  Achieve FOTO score of 71/100 in 4 weeks   2  Able to  and lift for all tasks without R UE pain in 4 weeks  3  Strength R UE = 5/5 all motions in 4 weeks  4    No R UE tightness in 4 weeks    Plan  Patient would benefit from: skilled PT and PT eval  Planned modality interventions: cryotherapy and thermotherapy: hydrocollator packs  Other planned modality interventions: laser  Planned therapy interventions: flexibility, functional ROM exercises, home exercise program, manual therapy, strengthening, stretching, therapeutic activities and therapeutic exercise  Frequency: 2x week  Duration in weeks: 4  Plan of Care beginning date: 2023  Plan of Care expiration date: 2023  Treatment plan discussed with: patient        Subjective Evaluation    History of Present Illness  Mechanism of injury: Pt reports having R arm and forearm pain for about 2 months after golfing  Has some issues with performing surgery due to pain  Pt is working as a vascular surgeon for [employer]  Not a recurrent problem   Quality of life: excellent    Pain  Current pain ratin  At best pain ratin  At worst pain ratin  Quality: tight and dull ache  Relieving factors: rest  Aggravating factors: lifting  Progression: worsening    Social Support  Lives with: spouse    Employment status: working  Hand dominance: right      Diagnostic Tests  No diagnostic tests performed  Treatments  No previous or current treatments  Patient Goals  Patient goals for therapy: increased strength, return to sport/leisure activities, independence with ADLs/IADLs, decreased pain and increased motion          Objective     Observations     Right Elbow   Negative for effusion  Palpation     Right   No palpable tenderness to the biceps, brachioradialis, supinator, triceps and wrist extensors  Hypertonic in the brachialis, pronator teres and wrist flexors  Tenderness     Right Elbow   No tenderness in the lateral epicondyle and medial epicondyle  Right Wrist/Hand   No tenderness in the lateral epicondyle and medial epicondyle       Neurological Testing     Sensation     Elbow   Left Elbow   Intact: light touch    Right Elbow   Intact: light touch    Active Range of Motion     Left Elbow   Normal active range of motion    Right Elbow   Normal active range of motion    Left Wrist   Normal active range of motion    Right Wrist   Normal active range of motion    Strength/Myotome Testing     Left Elbow   Flexion: 5  Extension: 5  Forearm supination: 5  Forearm pronation: 5    Right Elbow   Flexion: 4+  Extension: 5  Forearm supination: 5  Forearm pronation: 4+    Left Wrist/Hand   Normal wrist strength    Right Wrist/Hand "  Normal wrist strength        POC EXPIRES On:  7/2/23  PRECAUTIONS:  None  CO-MORBIDITES:  None  PERSONAL FACTORS:  None      Manuals HEP 6/2           Graston R brachialis + pronator teres  10'           STM R bracialis and pronator teres  10'                                     Neuro Re-Ed                                                                                                Ther Ex    Stretch of R wrist flexors 6/2 15\" 3                                                                                                      Ther Activity                              Gait Training                              Modalities                                        "

## 2023-06-19 ENCOUNTER — OFFICE VISIT (OUTPATIENT)
Dept: URGENT CARE | Facility: CLINIC | Age: 68
End: 2023-06-19
Payer: COMMERCIAL

## 2023-06-19 VITALS
DIASTOLIC BLOOD PRESSURE: 79 MMHG | OXYGEN SATURATION: 98 % | HEIGHT: 69 IN | TEMPERATURE: 98.2 F | HEART RATE: 62 BPM | SYSTOLIC BLOOD PRESSURE: 150 MMHG | WEIGHT: 184 LBS | BODY MASS INDEX: 27.25 KG/M2

## 2023-06-19 DIAGNOSIS — R05.1 ACUTE COUGH: ICD-10-CM

## 2023-06-19 DIAGNOSIS — J02.9 ACUTE PHARYNGITIS, UNSPECIFIED ETIOLOGY: Primary | ICD-10-CM

## 2023-06-19 PROCEDURE — 99213 OFFICE O/P EST LOW 20 MIN: CPT

## 2023-06-19 PROCEDURE — 87880 STREP A ASSAY W/OPTIC: CPT

## 2023-06-19 NOTE — PATIENT INSTRUCTIONS
Your rapid strep test was negative  No antibiotics are needed at this time  You can take plain Robitussin at night and in the morning  For sore throat you can use Cepacol lozenges, do warm salt water gargles, drink warm water with lemon or herbal teas, or use an over-the-counter throat spray (Chloraseptic)  Follow up with your PCP in 3-5 days if symptoms persist     Go to the ER if symptoms significantly worsen

## 2023-06-19 NOTE — PROGRESS NOTES
[facility]        NAME: Honey Hurst is a 69 y o  male  : 1955    MRN: [id number]  DATE: 2023  TIME: 9:03 PM    Assessment and Plan   Acute pharyngitis, unspecified etiology [J02 9]  1  Acute pharyngitis, unspecified etiology  POCT rapid strepA      2  Acute cough              Patient Instructions     Your rapid strep test was negative  No antibiotics are needed at this time  You can take plain Robitussin at night and in the morning  For sore throat you can use Cepacol lozenges, do warm salt water gargles, drink warm water with lemon or herbal teas, or use an over-the-counter throat spray (Chloraseptic)  Follow up with your PCP in 3-5 days if symptoms persist     Go to the ER if symptoms significantly worsen  Chief Complaint     Chief Complaint   Patient presents with   • Sore Throat     Pt has had a sore throat for the last 2 weeks, along with coughing up sputum for about 2 weeks  History of Present Illness       This is a 69yo male who presents for evaluation of sore throat and cough x1 5-2 weeks  Patient reports cough is worse in the mornings when he wakes up and is occasionally productive of tan sputum  Has recently developed a hoarse, scratchy voice  He denies associated SOB, chest pain, or chest tightness  Endorses nasal congestion with no significant rhinorrhea or PND  Patient denies known fevers/chills, HA, abdominal pain, dysuria, and N/V/D  Review of Systems   Review of Systems   Constitutional: Negative for chills, fatigue and fever  HENT: Positive for congestion, sore throat and voice change  Negative for ear pain, rhinorrhea and sinus pressure  Eyes: Negative for discharge and redness  Respiratory: Positive for cough  Negative for chest tightness, shortness of breath and wheezing  Cardiovascular: Negative for chest pain and palpitations  Gastrointestinal: Negative for abdominal pain, diarrhea, nausea and vomiting     Genitourinary: Negative for dysuria  Musculoskeletal: Negative for arthralgias and myalgias  Skin: Negative for rash  Neurological: Negative for dizziness, light-headedness and headaches  Current Medications       Current Outpatient Medications:   •  aspirin 81 mg chewable tablet, Chew, Disp: , Rfl:   •  Cholecalciferol (Dialyvite Vitamin D 5000) 125 MCG (5000 UT) capsule, Take 5,000 Units by mouth daily  , Disp: , Rfl:   •  flecainide (TAMBOCOR) 50 mg tablet, TAKE 1 TABLET BY MOUTH TWICE A DAY, Disp: 180 tablet, Rfl: 3  •  oxazepam (SERAX) 10 mg capsule, Take 1 capsule (10 mg total) by mouth 2 (two) times a day, Disp: 180 capsule, Rfl: 3  •  rosuvastatin (CRESTOR) 10 MG tablet, TAKE 1 TABLET BY MOUTH EVERY DAY, Disp: 90 tablet, Rfl: 3    Current Allergies     Allergies as of 06/19/2023   • (No Known Allergies)            The following portions of the patient's history were reviewed and updated as appropriate: allergies, current medications, past family history, past medical history, past social history, past surgical history and problem list      Past Medical History:   Diagnosis Date   • Colon polyp    • GERD (gastroesophageal reflux disease)    • Irregular heart beat     PAC       Past Surgical History:   Procedure Laterality Date   • CHOLECYSTECTOMY     • COLONOSCOPY     • EGD     • EGD AND COLONOSCOPY     • KNEE ARTHROSCOPY Bilateral    • KNEE SURGERY Left     benign tumor removed    • IN LAPAROSCOPY SURG CHOLECYSTECTOMY N/A 10/9/2020    Procedure: LAPAROSCOPIC CHOLECYSTECTOMY, umbilical hernia repair;  Surgeon: Cherelle Hernandez MD;  Location: [facility];  Service: General   • PROSTATE SURGERY      2007       Family History   Problem Relation Age of Onset   • Lymphoma Mother    • Lung cancer Father    • Heart disease Brother    • Other Brother         multisystem atrophy   • Colon cancer Maternal Grandmother          Medications have been verified          Objective   /79   Pulse 62   Temp 98 2 °F (36 8 °C) "Ht 5' 9\" (1 753 m)   Wt 83 5 kg (184 lb)   SpO2 98%   BMI 27 17 kg/m²        Physical Exam     Physical Exam  Vitals and nursing note reviewed  Constitutional:       General: He is not in acute distress  Appearance: He is not ill-appearing, toxic-appearing or diaphoretic  HENT:      Head: Normocephalic  Comments: Mild tenderness to maxillary sinus     Right Ear: Tympanic membrane, ear canal and external ear normal       Left Ear: Tympanic membrane, ear canal and external ear normal       Nose: Congestion present  Mouth/Throat:      Mouth: Mucous membranes are moist       Pharynx: Oropharynx is clear  No oropharyngeal exudate or posterior oropharyngeal erythema  Comments: Hoarse voice present  Eyes:      Conjunctiva/sclera: Conjunctivae normal    Cardiovascular:      Rate and Rhythm: Normal rate and regular rhythm  Pulses: Normal pulses  Heart sounds: Normal heart sounds  Pulmonary:      Effort: Pulmonary effort is normal       Breath sounds: Normal breath sounds  Musculoskeletal:         General: Normal range of motion  Cervical back: Normal range of motion and neck supple  Lymphadenopathy:      Cervical: No cervical adenopathy  Skin:     General: Skin is warm and dry  Capillary Refill: Capillary refill takes less than 2 seconds  Neurological:      Mental Status: He is alert and oriented to person, place, and time                     "

## 2023-06-21 LAB — S PYO AG THROAT QL: NEGATIVE

## 2023-06-23 ENCOUNTER — OFFICE VISIT (OUTPATIENT)
Dept: FAMILY MEDICINE CLINIC | Facility: CLINIC | Age: 68
End: 2023-06-23
Payer: COMMERCIAL

## 2023-06-23 VITALS
OXYGEN SATURATION: 98 % | BODY MASS INDEX: 27.13 KG/M2 | HEART RATE: 62 BPM | WEIGHT: 183.2 LBS | HEIGHT: 69 IN | RESPIRATION RATE: 18 BRPM | DIASTOLIC BLOOD PRESSURE: 84 MMHG | TEMPERATURE: 97.2 F | SYSTOLIC BLOOD PRESSURE: 122 MMHG

## 2023-06-23 DIAGNOSIS — Z79.899 ENCOUNTER FOR LONG-TERM (CURRENT) USE OF MEDICATIONS: ICD-10-CM

## 2023-06-23 DIAGNOSIS — J40 BRONCHITIS: Primary | ICD-10-CM

## 2023-06-23 DIAGNOSIS — E78.00 PURE HYPERCHOLESTEROLEMIA: ICD-10-CM

## 2023-06-23 DIAGNOSIS — E55.9 VITAMIN D INSUFFICIENCY: ICD-10-CM

## 2023-06-23 DIAGNOSIS — Z85.46 PERSONAL HISTORY OF MALIGNANT NEOPLASM OF PROSTATE: ICD-10-CM

## 2023-06-23 DIAGNOSIS — H10.32 ACUTE BACTERIAL CONJUNCTIVITIS OF LEFT EYE: ICD-10-CM

## 2023-06-23 DIAGNOSIS — C61 MALIGNANT NEOPLASM OF PROSTATE (HCC): ICD-10-CM

## 2023-06-23 PROCEDURE — 99214 OFFICE O/P EST MOD 30 MIN: CPT | Performed by: FAMILY MEDICINE

## 2023-06-23 RX ORDER — DEXTROMETHORPHAN HYDROBROMIDE AND PROMETHAZINE HYDROCHLORIDE 15; 6.25 MG/5ML; MG/5ML
5 SOLUTION ORAL 4 TIMES DAILY PRN
Qty: 180 ML | Refills: 1 | Status: SHIPPED | OUTPATIENT
Start: 2023-06-23 | End: 2023-06-28

## 2023-06-23 RX ORDER — TOBRAMYCIN AND DEXAMETHASONE 3; 1 MG/ML; MG/ML
1 SUSPENSION/ DROPS OPHTHALMIC
Qty: 5 ML | Refills: 0 | Status: SHIPPED | OUTPATIENT
Start: 2023-06-23

## 2023-06-23 RX ORDER — AZITHROMYCIN 250 MG/1
TABLET, FILM COATED ORAL
Qty: 6 TABLET | Refills: 0 | Status: SHIPPED | OUTPATIENT
Start: 2023-06-23 | End: 2023-06-28

## 2023-06-23 NOTE — PROGRESS NOTES
Name: Evelyn Mc      : 1955      MRN: [id number]  Encounter Provider: Deanna Madison DO  Encounter Date: 2023   Encounter department: [address] [place] [address]  Bronchitis  Comments:  6-7 weeks now and not resolving  Now that the sputum is yellow-tan and lengthy duration, add antibiotic  Orders:  -     azithromycin (Zithromax) 250 mg tablet; Take 2 tablets (500 mg total) by mouth daily for 1 day, THEN 1 tablet (250 mg total) daily for 4 days   -     Promethazine-DM (PHENERGAN-DM) 6 25-15 mg/5 mL oral syrup; Take 5 mL by mouth 4 (four) times a day as needed for cough for up to 5 days    2  Acute bacterial conjunctivitis of left eye  Comments:  1 day ago with left eye itching and slight crusting  Lateral conjunctiva injected whereas right perfect  Impression--early conjunctivitis  Orders:  -     tobramycin-dexamethasone (TOBRADEX) ophthalmic suspension; Administer 1 drop into the left eye every 4 (four) hours while awake    3  Malignant neoplasm of prostate ([address] )    4  Pure hypercholesterolemia  Comments:  Taking Crestor 10 mg once daily without difficulty    5  Encounter for long-term (current) use of medications  Comments: All medications reviewed for safety efficacy tolerance and compliance  No issues    6  Personal history of malignant neoplasm of prostate  Comments:  Very remote and will continue checking PSA yearly        Subjective      69 y o male, OS tearing and itching, now hoarse  Voice and coughing up yellow-tan sputum  No fever  Onset 2 wks  did go to care now but no antibiotic given  Symptoms have worsened and sputum now yellow-brown, time to place on Zithromax    Review of Systems   Constitutional: Positive for fatigue  Negative for fever  HENT: Positive for congestion, postnasal drip and rhinorrhea  Respiratory: Positive for cough  Negative for wheezing  Cardiovascular: Negative  Gastrointestinal: Negative  Endocrine: Negative  "  Genitourinary: Negative  Neurological: Positive for headaches  Psychiatric/Behavioral: Negative  Current Outpatient Medications on File Prior to Visit   Medication Sig   • aspirin 81 mg chewable tablet Chew   • Cholecalciferol (Dialyvite Vitamin D 5000) 125 MCG (5000 UT) capsule Take 5,000 Units by mouth daily     • flecainide (TAMBOCOR) 50 mg tablet TAKE 1 TABLET BY MOUTH TWICE A DAY   • oxazepam (SERAX) 10 mg capsule Take 1 capsule (10 mg total) by mouth 2 (two) times a day   • rosuvastatin (CRESTOR) 10 MG tablet TAKE 1 TABLET BY MOUTH EVERY DAY       Objective     /84 (BP Location: Left arm, Patient Position: Sitting, Cuff Size: Adult)   Pulse 62   Temp (!) 97 2 °F (36 2 °C) (Temporal)   Resp 18   Ht 5' 9\" (1 753 m)   Wt 83 1 kg (183 lb 3 2 oz)   SpO2 98%   BMI 27 05 kg/m²     Physical Exam  Constitutional:       General: He is not in acute distress  Appearance: Normal appearance  He is not ill-appearing, toxic-appearing or diaphoretic  HENT:      Head: Normocephalic and atraumatic  Right Ear: Tympanic membrane normal       Left Ear: Tympanic membrane normal       Nose: Congestion and rhinorrhea present  Mouth/Throat:      Mouth: Mucous membranes are moist    Eyes:      Extraocular Movements: Extraocular movements intact  Pupils: Pupils are equal, round, and reactive to light  Cardiovascular:      Rate and Rhythm: Normal rate and regular rhythm  Pulmonary:      Breath sounds: No rhonchi  Musculoskeletal:         General: No tenderness  Cervical back: Normal range of motion and neck supple  Right lower leg: No edema  Left lower leg: No edema  Skin:     Coloration: Skin is not pale  Findings: No erythema  Neurological:      General: No focal deficit present  Mental Status: He is alert and oriented to person, place, and time  Mental status is at baseline     Psychiatric:         Mood and Affect: Mood normal          Behavior: Behavior " normal          Thought Content: Thought content normal          Judgment: Judgment normal          BMI Counseling: Body mass index is 27 05 kg/m²  The BMI is above normal  Nutrition recommendations include decreasing portion sizes, encouraging healthy choices of fruits and vegetables, decreasing fast food intake, consuming healthier snacks, limiting drinks that contain sugar, moderation in carbohydrate intake, increasing intake of lean protein and reducing intake of saturated and trans fat  Exercise recommendations include moderate physical activity 150 minutes/week and exercising 3-5 times per week  No pharmacotherapy was ordered  Rationale for BMI follow-up plan is due to patient being overweight or obese  Depression Screening and Follow-up Plan: Patient was screened for depression during today's encounter  They screened negative with a PHQ-2 score of 0  I personally reviewed the recent (and prior)  lab results, the image studies, pathology, other specialty/physicians consult notes and recommendations, and outside medical records from other institutions, as appropriate  I had a lengthy discussion with the patient and shared the work-up findings  We discussed the diagnosis and management plan  I spent  30  minutes reviewing the records (labs, clinician notes, outside records, medical history, ordering medicine/tests/procedures, interpreting the imaging/labs previously done) and coordination of care as well as direct time with the patient today, of which greater than 50% of the time was spent in counseling and coordination of care with the patient/family    Pt will return in Aug for his annual physical      Blair Treviño DO

## 2023-08-11 ENCOUNTER — OFFICE VISIT (OUTPATIENT)
Dept: PHYSICAL THERAPY | Facility: CLINIC | Age: 68
End: 2023-08-11
Payer: COMMERCIAL

## 2023-08-11 DIAGNOSIS — S39.012D LUMBAR STRAIN, SUBSEQUENT ENCOUNTER: Primary | ICD-10-CM

## 2023-08-11 PROCEDURE — 97110 THERAPEUTIC EXERCISES: CPT | Performed by: PHYSICAL THERAPIST

## 2023-08-11 PROCEDURE — 97161 PT EVAL LOW COMPLEX 20 MIN: CPT | Performed by: PHYSICAL THERAPIST

## 2023-08-11 NOTE — PROGRESS NOTES
PT Evaluation     Today's date: 2023  Patient name: Storm Castro  : 1955  MRN: [id number]  Referring provider: Emily Henderson, PT  Dx:   Encounter Diagnosis     ICD-10-CM    1. Lumbar strain, subsequent encounter  S39.012D       Addendum 22: All sx's gone. Ready for D/C. Assessment  Assessment details: Storm Castro is a 69 y.o. male presenting to outpatient physical therapy at [facility] with complaints of L LBP. He presents with decreased STM and decreased lumbar AROM due to pain. Pain started after performing yardwork on 23. He presented to me via direct access. He would benefit from skilled PT services in order to address these deficits and reach maximum level of function. Impairments: abnormal or restricted ROM, activity intolerance, impaired physical strength, lacks appropriate home exercise program and pain with function  Barriers to therapy: None  Understanding of Dx/Px/POC: excellent  Goals  ST. Independent with HEP in 2 weeks  2. No L LB tightness in 2 weeks    LT. Able to return to running without LBP in 4 weeks  2. No tenderness LB in 4 weeks    Plan  Patient would benefit from: skilled PT and PT eval  Planned therapy interventions: flexibility, functional ROM exercises, home exercise program, manual therapy, neuromuscular re-education, strengthening, stretching, therapeutic activities and therapeutic exercise  Frequency: 2x week  Duration in weeks: 4  Plan of Care beginning date: 2023  Plan of Care expiration date: 9/10/2023  Treatment plan discussed with: patient        Subjective Evaluation    History of Present Illness  Mechanism of injury: Pt reports having L LBP since 23 after mulching in his yard. Having the most pain when getting up from sitting and after sleeping a full night and getting OOB. Had been running about 15 miles/week and rides his bike too. 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   Has some hx of lumbar pain.  Tried massage without pain relief. Recurrent probem    Quality of life: excellent    Patient Goals  Patient goals for therapy: decreased pain, return to sport/leisure activities, independence with ADLs/IADLs and increased motion    Pain  Current pain ratin  At best pain ratin  At worst pain ratin  Quality: dull ache, sharp and tight  Progression: no change    Social Support  Steps to enter house: yes  Stairs in house: yes   Lives in: multiple-level home  Lives with: spouse    Employment status: working    Diagnostic Tests  No diagnostic tests performed  Treatments  Previous treatment: physical therapy and massage        Objective     Concurrent Complaints  Negative for night pain, disturbed sleep, bladder dysfunction and bowel dysfunction    Postural Observations  Seated posture: fair  Standing posture: good        Observations     Right Knee   Negative for effusion. Palpation   Left   Tenderness of the erector spinae. Right   No palpable tenderness to the erector spinae. Hypertonic in the rectus femoris.      Cervical Spine Comments  Left erector spinae: L5.     Neurological Testing     Sensation     Lumbar   Left   Intact: light touch    Right   Intact: light touch    Knee   Left Knee   Intact: light touch    Right Knee   Intact: light touch     Active Range of Motion     Lumbar   Normal active range of motion    Strength/Myotome Testing     Lumbar   Left   Normal strength    Right   Normal strength    Additional Strength Details  Abdominals = 4/5    Ambulation     Ambulation: Stairs   Ascend stairs: independent  Pattern: reciprocal  Railings: without rails  Descend stairs: independent  Pattern: reciprocal  Railings: without rails  Curbs: independent    Observational Gait   Gait: within functional limits         POC EXPIRES On:  9/10/23  PRECAUTIONS:  None  CO-MORBIDITES:  None  PERSONAL FACTORS:  None      Manuals HEP  Neuro Re-Ed     PPT supine 8/11 5" 10                                                                                         Ther Ex    Cross leg stretch to R in supine 8/11 10" 5           SKTC L 8/11 10" 5                                                                                         Ther Activity                              Gait Training                              Modalities                                 '

## 2023-08-30 ENCOUNTER — TELEPHONE (OUTPATIENT)
Age: 68
End: 2023-08-30

## 2023-08-30 NOTE — TELEPHONE ENCOUNTER
Caller: Dr. Dionicio Reyes    Doctor: Fela Harmon     Reason for call: Lt Hand Middle Finger Trigger Finger, Scheduled for November but was asking if we could call with anything sooner Dr. Fela Harmon may have     Call back#: [phone]

## 2023-09-13 ENCOUNTER — OFFICE VISIT (OUTPATIENT)
Dept: OBGYN CLINIC | Facility: HOSPITAL | Age: 68
End: 2023-09-13
Payer: COMMERCIAL

## 2023-09-13 VITALS
WEIGHT: 183 LBS | SYSTOLIC BLOOD PRESSURE: 138 MMHG | HEART RATE: 66 BPM | BODY MASS INDEX: 27.11 KG/M2 | DIASTOLIC BLOOD PRESSURE: 80 MMHG | HEIGHT: 69 IN

## 2023-09-13 DIAGNOSIS — M65.332 TRIGGER MIDDLE FINGER OF LEFT HAND: Primary | ICD-10-CM

## 2023-09-13 PROCEDURE — 99204 OFFICE O/P NEW MOD 45 MIN: CPT | Performed by: ORTHOPAEDIC SURGERY

## 2023-09-13 PROCEDURE — 20550 NJX 1 TENDON SHEATH/LIGAMENT: CPT | Performed by: ORTHOPAEDIC SURGERY

## 2023-09-13 RX ORDER — BETAMETHASONE SODIUM PHOSPHATE AND BETAMETHASONE ACETATE 3; 3 MG/ML; MG/ML
6 INJECTION, SUSPENSION INTRA-ARTICULAR; INTRALESIONAL; INTRAMUSCULAR; SOFT TISSUE
Status: COMPLETED | OUTPATIENT
Start: 2023-09-13 | End: 2023-09-13

## 2023-09-13 RX ORDER — BUPIVACAINE HYDROCHLORIDE 2.5 MG/ML
1 INJECTION, SOLUTION INFILTRATION; PERINEURAL
Status: COMPLETED | OUTPATIENT
Start: 2023-09-13 | End: 2023-09-13

## 2023-09-13 RX ADMIN — BETAMETHASONE SODIUM PHOSPHATE AND BETAMETHASONE ACETATE 6 MG: 3; 3 INJECTION, SUSPENSION INTRA-ARTICULAR; INTRALESIONAL; INTRAMUSCULAR; SOFT TISSUE at 09:15

## 2023-09-13 RX ADMIN — BUPIVACAINE HYDROCHLORIDE 1 ML: 2.5 INJECTION, SOLUTION INFILTRATION; PERINEURAL at 09:15

## 2023-09-13 NOTE — PROGRESS NOTES
ASSESSMENT/PLAN:    Assessment:   Left long trigger finger    Plan:   Discussed treatment options including continued observation, activity modification, cortisone injection versus surgical intervention. Patient wishes to proceed with cortisone injection to the A1 pulley of the left long finger today in the office. He may continue activities as tolerated. Follow Up:  6  week(s)    To Do Next Visit:  Re-evaluation    General Discussions:  Trigger FInger: The anatomy and physiology of trigger finger was discussed with the patient today in the office. Edema and increased contact pressure within the flexor tendons at the A1 pulley can cause pain, crepitation, and limitation of function. Treatment options include resting MP blocking splints to decrease edema, oral anti-inflammatory medications, home or formal therapy exercises, up to 2 steroid injections within the tendon sheath, or surgical release. While majority of patients do respond to conservative treatment, up to 20% may require surgical release.     _____________________________________________________  CHIEF COMPLAINT:  Chief Complaint   Patient presents with   • Left Middle Finger - Triggering         SUBJECTIVE:  Jalyn Andrews is a 69 y.o. male who presents with left long finger catching. He notes this started initially 6 months ago for which he started bracing which relieved his symptoms, but over the past couple of weeks he has had increased pain and swelling to the left long finger. He notes catching and clicking with motion.     PAST MEDICAL HISTORY:  Past Medical History:   Diagnosis Date   • Colon polyp    • GERD (gastroesophageal reflux disease)    • Irregular heart beat     PAC       PAST SURGICAL HISTORY:  Past Surgical History:   Procedure Laterality Date   • CHOLECYSTECTOMY     • COLONOSCOPY     • EGD     • EGD AND COLONOSCOPY     • KNEE ARTHROSCOPY Bilateral    • KNEE SURGERY Left     benign tumor removed    • MI LAPAROSCOPY SURG CHOLECYSTECTOMY N/A 10/9/2020    Procedure: LAPAROSCOPIC CHOLECYSTECTOMY, umbilical hernia repair;  Surgeon: Selin Davison MD;  Location: [facility];  Service: General   • PROSTATE SURGERY      2007       FAMILY HISTORY:  Family History   Problem Relation Age of Onset   • Lymphoma Mother    • Lung cancer Father    • Heart disease Brother    • Other Brother         multisystem atrophy   • Colon cancer Maternal Grandmother        SOCIAL HISTORY:  Social History     Tobacco Use   • Smoking status: Never   • Smokeless tobacco: Never   Vaping Use   • Vaping Use: Never used   Substance Use Topics   • Alcohol use: Yes     Alcohol/week: 1.0 standard drink of alcohol     Types: 1 Glasses of wine per week   • Drug use: Never       MEDICATIONS:    Current Outpatient Medications:   •  aspirin 81 mg chewable tablet, Chew, Disp: , Rfl:   •  Cholecalciferol (Dialyvite Vitamin D 5000) 125 MCG (5000 UT) capsule, Take 5,000 Units by mouth daily  , Disp: , Rfl:   •  flecainide (TAMBOCOR) 50 mg tablet, TAKE 1 TABLET BY MOUTH TWICE A DAY, Disp: 180 tablet, Rfl: 3  •  oxazepam (SERAX) 10 mg capsule, Take 1 capsule (10 mg total) by mouth 2 (two) times a day, Disp: 180 capsule, Rfl: 3  •  rosuvastatin (CRESTOR) 10 MG tablet, TAKE 1 TABLET BY MOUTH EVERY DAY, Disp: 90 tablet, Rfl: 3  •  tobramycin-dexamethasone (TOBRADEX) ophthalmic suspension, Administer 1 drop into the left eye every 4 (four) hours while awake, Disp: 5 mL, Rfl: 0    ALLERGIES:  No Known Allergies    REVIEW OF SYSTEMS:  Pertinent items are noted in HPI. A comprehensive review of systems was negative.     LABS:  HgA1c:   Lab Results   Component Value Date    HGBA1C 5.6 08/01/2019     BMP:   Lab Results   Component Value Date    GLUCOSE 107 06/11/2015    CALCIUM 9.2 11/01/2022     06/11/2015    K 3.8 11/01/2022    CO2 28 11/01/2022     11/01/2022    BUN 18 11/01/2022    CREATININE 1.34 (H) 11/01/2022 _____________________________________________________  PHYSICAL EXAMINATION:  Vital signs: /80   Pulse 66   Ht 5' 9" (1.753 m)   Wt 83 kg (183 lb)   BMI 27.02 kg/m²   General: well developed and well nourished, alert, oriented times 3 and appears comfortable  Psychiatric: Normal  HEENT: Trachea Midline, No torticollis  Cardiovascular: No discernable arrhythmia  Pulmonary: No wheezing or stridor  Abdomen: No rebound or guarding  Extremities: No peripheral edema  Skin: No masses, erythema, lacerations, fluctation, ulcerations  Neurovascular: Sensation Intact to the Median, Ulnar, Radial Nerve, Motor Intact to the Median, Ulnar, Radial Nerve and Pulses Intact    MUSCULOSKELETAL EXAMINATION:  Left long finger: swelling noted to the PIP joint, non-tender nodule A1 pulley, positive crepitation, full composite fist    _____________________________________________________  STUDIES REVIEWED:  No Studies to review      PROCEDURES PERFORMED:  Hand/upper extremity injection: L long A1  Universal Protocol:  Consent: Verbal consent obtained. Risks and benefits: risks, benefits and alternatives were discussed  Consent given by: patient  Time out: Immediately prior to procedure a "time out" was called to verify the correct patient, procedure, equipment, support staff and site/side marked as required.   Timeout called at: 9/13/2023 9:21 AM.  Patient understanding: patient states understanding of the procedure being performed  Site marked: the operative site was marked  Patient identity confirmed: verbally with patient    Supporting Documentation  Indications: pain   Procedure Details  Condition:trigger finger Location: long finger - L long A1   Preparation: Patient was prepped and draped in the usual sterile fashion  Needle size: 25 G  Ultrasound guidance: no  Medications administered: 1 mL bupivacaine 0.25 %; 6 mg betamethasone acetate-betamethasone sodium phosphate 6 (3-3) mg/mL    Patient tolerance: patient tolerated the procedure well with no immediate complications  Dressing:  Sterile dressing applied           Scribe Attestation    I,:  Deangelo Everett am acting as a scribe while in the presence of the attending physician.:       I,:  Van Alvarez MD personally performed the services described in this documentation    as scribed in my presence.:

## 2023-10-16 ENCOUNTER — APPOINTMENT (OUTPATIENT)
Dept: LAB | Facility: CLINIC | Age: 68
End: 2023-10-16
Payer: COMMERCIAL

## 2023-10-16 DIAGNOSIS — Z79.899 ENCOUNTER FOR LONG-TERM (CURRENT) USE OF MEDICATIONS: ICD-10-CM

## 2023-10-16 DIAGNOSIS — E55.9 VITAMIN D INSUFFICIENCY: ICD-10-CM

## 2023-10-16 DIAGNOSIS — C61 MALIGNANT NEOPLASM OF PROSTATE (HCC): ICD-10-CM

## 2023-10-16 DIAGNOSIS — E78.00 PURE HYPERCHOLESTEROLEMIA: ICD-10-CM

## 2023-10-16 LAB
25(OH)D3 SERPL-MCNC: 44.2 NG/ML (ref 30–100)
ALBUMIN SERPL BCP-MCNC: 4.6 G/DL (ref 3.5–5)
ALP SERPL-CCNC: 52 U/L (ref 34–104)
ALT SERPL W P-5'-P-CCNC: 15 U/L (ref 7–52)
ANION GAP SERPL CALCULATED.3IONS-SCNC: 6 MMOL/L
AST SERPL W P-5'-P-CCNC: 16 U/L (ref 13–39)
BILIRUB SERPL-MCNC: 0.72 MG/DL (ref 0.2–1)
BILIRUB UR QL STRIP: NEGATIVE
BUN SERPL-MCNC: 17 MG/DL (ref 5–25)
CALCIUM SERPL-MCNC: 9.5 MG/DL (ref 8.4–10.2)
CHLORIDE SERPL-SCNC: 102 MMOL/L (ref 96–108)
CHOLEST SERPL-MCNC: 226 MG/DL
CLARITY UR: CLEAR
CO2 SERPL-SCNC: 31 MMOL/L (ref 21–32)
COLOR UR: COLORLESS
CREAT SERPL-MCNC: 1.14 MG/DL (ref 0.6–1.3)
CREAT UR-MCNC: 32.9 MG/DL
ERYTHROCYTE [DISTWIDTH] IN BLOOD BY AUTOMATED COUNT: 12.2 % (ref 11.6–15.1)
GFR SERPL CREATININE-BSD FRML MDRD: 64 ML/MIN/1.73SQ M
GLUCOSE P FAST SERPL-MCNC: 101 MG/DL (ref 65–99)
GLUCOSE UR STRIP-MCNC: NEGATIVE MG/DL
HCT VFR BLD AUTO: 44.8 % (ref 36.5–49.3)
HDLC SERPL-MCNC: 93 MG/DL
HGB BLD-MCNC: 14.9 G/DL (ref 12–17)
HGB UR QL STRIP.AUTO: NEGATIVE
KETONES UR STRIP-MCNC: NEGATIVE MG/DL
LDLC SERPL CALC-MCNC: 106 MG/DL (ref 0–100)
LEUKOCYTE ESTERASE UR QL STRIP: NEGATIVE
MCH RBC QN AUTO: 30.2 PG (ref 26.8–34.3)
MCHC RBC AUTO-ENTMCNC: 33.3 G/DL (ref 31.4–37.4)
MCV RBC AUTO: 91 FL (ref 82–98)
MICROALBUMIN UR-MCNC: <7 MG/L
MICROALBUMIN/CREAT 24H UR: <21 MG/G CREATININE (ref 0–30)
NITRITE UR QL STRIP: NEGATIVE
NONHDLC SERPL-MCNC: 133 MG/DL
PH UR STRIP.AUTO: 6.5 [PH]
PLATELET # BLD AUTO: 281 THOUSANDS/UL (ref 149–390)
PMV BLD AUTO: 8.9 FL (ref 8.9–12.7)
POTASSIUM SERPL-SCNC: 3.8 MMOL/L (ref 3.5–5.3)
PROT SERPL-MCNC: 7.4 G/DL (ref 6.4–8.4)
PROT UR STRIP-MCNC: NEGATIVE MG/DL
PSA SERPL-MCNC: <0.01 NG/ML (ref 0–4)
RBC # BLD AUTO: 4.94 MILLION/UL (ref 3.88–5.62)
SODIUM SERPL-SCNC: 139 MMOL/L (ref 135–147)
SP GR UR STRIP.AUTO: 1.01 (ref 1–1.03)
TRIGL SERPL-MCNC: 133 MG/DL
TSH SERPL DL<=0.05 MIU/L-ACNC: 1.81 UIU/ML (ref 0.45–4.5)
UROBILINOGEN UR STRIP-ACNC: <2 MG/DL
WBC # BLD AUTO: 6.08 THOUSAND/UL (ref 4.31–10.16)

## 2023-10-16 PROCEDURE — 80053 COMPREHEN METABOLIC PANEL: CPT

## 2023-10-16 PROCEDURE — 85027 COMPLETE CBC AUTOMATED: CPT

## 2023-10-16 PROCEDURE — 82306 VITAMIN D 25 HYDROXY: CPT

## 2023-10-16 PROCEDURE — G0103 PSA SCREENING: HCPCS

## 2023-10-16 PROCEDURE — 84443 ASSAY THYROID STIM HORMONE: CPT

## 2023-10-16 PROCEDURE — 36415 COLL VENOUS BLD VENIPUNCTURE: CPT

## 2023-10-16 PROCEDURE — 80061 LIPID PANEL: CPT

## 2023-10-30 ENCOUNTER — RA CDI HCC (OUTPATIENT)
Dept: OTHER | Facility: HOSPITAL | Age: 68
End: 2023-10-30

## 2023-10-30 ENCOUNTER — OFFICE VISIT (OUTPATIENT)
Dept: OBGYN CLINIC | Facility: OTHER | Age: 68
End: 2023-10-30
Payer: COMMERCIAL

## 2023-10-30 VITALS — BODY MASS INDEX: 27.47 KG/M2 | HEIGHT: 69 IN | WEIGHT: 185.5 LBS

## 2023-10-30 DIAGNOSIS — M17.11 PRIMARY OSTEOARTHRITIS OF RIGHT KNEE: Primary | ICD-10-CM

## 2023-10-30 PROCEDURE — 99214 OFFICE O/P EST MOD 30 MIN: CPT | Performed by: ORTHOPAEDIC SURGERY

## 2023-10-30 PROCEDURE — 20610 DRAIN/INJ JOINT/BURSA W/O US: CPT | Performed by: ORTHOPAEDIC SURGERY

## 2023-10-30 PROCEDURE — 1159F MED LIST DOCD IN RCRD: CPT | Performed by: ORTHOPAEDIC SURGERY

## 2023-10-30 PROCEDURE — 1160F RVW MEDS BY RX/DR IN RCRD: CPT | Performed by: ORTHOPAEDIC SURGERY

## 2023-10-30 RX ORDER — BUPIVACAINE HYDROCHLORIDE 2.5 MG/ML
2 INJECTION, SOLUTION INFILTRATION; PERINEURAL
Status: COMPLETED | OUTPATIENT
Start: 2023-10-30 | End: 2023-10-30

## 2023-10-30 RX ORDER — BETAMETHASONE SODIUM PHOSPHATE AND BETAMETHASONE ACETATE 3; 3 MG/ML; MG/ML
6 INJECTION, SUSPENSION INTRA-ARTICULAR; INTRALESIONAL; INTRAMUSCULAR; SOFT TISSUE
Status: COMPLETED | OUTPATIENT
Start: 2023-10-30 | End: 2023-10-30

## 2023-10-30 RX ADMIN — BETAMETHASONE SODIUM PHOSPHATE AND BETAMETHASONE ACETATE 6 MG: 3; 3 INJECTION, SUSPENSION INTRA-ARTICULAR; INTRALESIONAL; INTRAMUSCULAR; SOFT TISSUE at 09:00

## 2023-10-30 RX ADMIN — BUPIVACAINE HYDROCHLORIDE 2 ML: 2.5 INJECTION, SOLUTION INFILTRATION; PERINEURAL at 09:00

## 2023-10-30 NOTE — PROGRESS NOTES
[address] [facility] coding opportunities       Chart reviewed, no opportunity found: CHART REVIEWED, NO OPPORTUNITY FOUND        Patients Insurance        Commercial Insurance: Yusef Mcgrath

## 2023-10-30 NOTE — PROGRESS NOTES
Assessment  Diagnoses and all orders for this visit:    Primary osteoarthritis of right knee    Discussion and Plan:    Explained to the patient that his signs and symptoms continue to be consistent with mild tricompartmental osteoarthritis likely secondary to his previous partial medial meniscectomy. He was provided with a repeat cortisone injection into his right knee today in the office as it gave him benefit on 7/11/22. This is documented appropriately below. Continue with HEP as tolerated. If his symptoms return and/or persist then an MRI scan of the right knee would be considered to further evaluate the severity of osteoarthritis and/or meniscal tear. He understood and all questions were answered. Follow up on an as needed basis    Subjective:   Patient ID: Nikki Cohen is a 77 y.o. male who presents to the office today for a follow up evaluation of his right knee pain. He states this has been ongoing for a few months. He denies any known injury or trauma. He notes pain globally about the knee which is increased with prolonged standing, knee flexion and ambulation. Patient states he is a avid runner and has been running for years. He has discontinued running over the past 3 weeks. He does also cycle. He denies any locking or catching. He has been using Voltaren Gel OTC for pain. He has also been using Advil as needed. Patient states he does have a history of medial mensicus repair performed appx 20-25 years ago with [facility]. The following portions of the patient's history were reviewed and updated as appropriate: allergies, current medications, past family history, past medical history, past social history, past surgical history and problem list.    Objective:  Ht 5' 9" (1.753 m)   Wt 84.1 kg (185 lb 8 oz)   BMI 27.39 kg/m²       Right Knee Exam     Tenderness   The patient is experiencing tenderness in the medial joint line and lateral joint line.     Range of Motion   Extension:  0 Flexion:  120     Tests   Varus: negative Valgus: negative    Other   Erythema: absent  Sensation: normal  Pulse: present  Effusion: no effusion present            Physical Exam  Constitutional:       General: He is not in acute distress. Appearance: He is well-developed. Eyes:      Conjunctiva/sclera: Conjunctivae normal.      Pupils: Pupils are equal, round, and reactive to light. Cardiovascular:      Rate and Rhythm: Normal rate and regular rhythm. Pulmonary:      Effort: Pulmonary effort is normal.      Breath sounds: Normal breath sounds. Abdominal:      General: Bowel sounds are normal.      Palpations: Abdomen is soft. Musculoskeletal:      Cervical back: Normal range of motion and neck supple. Right knee: No effusion. Skin:     General: Skin is warm and dry. Findings: No erythema or rash. Neurological:      Mental Status: He is alert and oriented to person, place, and time. Deep Tendon Reflexes: Reflexes are normal and symmetric. Psychiatric:         Behavior: Behavior normal.       Large joint arthrocentesis: R knee  Universal Protocol:  Consent: Verbal consent obtained. Risks and benefits: risks, benefits and alternatives were discussed  Consent given by: patient  Time out: Immediately prior to procedure a "time out" was called to verify the correct patient, procedure, equipment, support staff and site/side marked as required. Site marked: the operative site was marked  Radiology Images displayed and confirmed.  If images not available, report reviewed: imaging studies available  Patient identity confirmed: verbally with patient  Supporting Documentation  Indications: pain and diagnostic evaluation   Procedure Details  Location: knee - R knee  Preparation: Patient was prepped and draped in the usual sterile fashion  Needle size: 22 G  Ultrasound guidance: no  Approach: lateral  Medications administered: 2 mL bupivacaine 0.25 %; 6 mg betamethasone acetate-betamethasone sodium phosphate 6 (3-3) mg/mL    Patient tolerance: patient tolerated the procedure well with no immediate complications  Dressing:  Sterile dressing applied        I have personally reviewed pertinent films in PACS and my interpretation is as follows. X-rays right knee performed on 7/11/22 demonstrate very mild right knee osteoarthritis as manifested by mild joint space narrowing of the medial compartment.      Scribe Attestation      I,:  Bobby Barrera am acting as a scribe while in the presence of the attending physician.:       I,:  Roscoe Kauffman MD personally performed the services described in this documentation    as scribed in my presence.:

## 2023-10-30 NOTE — PATIENT INSTRUCTIONS
CORTICOSTEROID INJECTION  What is a corticosteroid? Injuries or disease such as arthritis, bursitis or tendonitis result in inflammation. In turn, this inflammation can cause swelling and pain. A local injection of a corticosteroid is provided to diminish inflammation. By doing so, it will also decrease pain and swelling which is making you uncomfortable. Is this the same thing as a Cortisone Injection? Cortisone® is a brand name of a corticosteroid used commonly in the past.  Today I commonly use a more water-soluble corticosteroid named Celestone®. Will the injection hurt? As with any injection, you may feel pain at the time of the injection. Typically, I use a local anesthetic (Estephanie Grapes) in addition to the corticosteroid to determine if the injection has been placed in the appropriate location. Hence it is important to monitor your symptoms 4-6 hours after the injection, as the area will be anesthetized (numb) while the local anesthetic is working. Once the local anesthetic wears off, the intensity of pain can be the same as it was prior to the injection, or even worse. This does not mean that the injection is not working. The corticosteroid may take 24-72 hours to begin having a positive effect. If you do experience an increase in pain, the use of an ice pack on the area for 20 minutes at a time should help. It is also helpful to take an oral anti-inflammatory such as Tylenol® or Motrin® if you are able to medically do so. For this reason it is best to avoid activities that put stress on the area the first 24 hours after the injection. How long will pain relief last?  This will vary according to the type and severity of the symptoms being treated and the severity of the condition. Symptom relief may last weeks to months. I typically couple injections with physical therapy so that the underlying problem causing the inflammation may be treated as the pain diminishes.   If the combination is not successful, you may be a surgical candidate. I have read bad things about steroids. Will these things happen to me? Corticosteroids, when utilized properly, are safe and effective drugs. When used in a low dose, potential adverse reactions are very rare. Some patients may experience a sensation of flushing for several days. Very rarely, there can be a local reaction which may include increased discomfort for a period of time in the areas that has been injected. A steroid should not be used over and over again. Multiple injections in the same area can produce adverse effects such as tissue atrophy and degeneration of tendon or cartilage. A small percentage of patients (< 0.1%) may develop an infection in the joint after injection. This is a treatable problem, but if neglected, may result in permanent disability. Signs of infection include redness, swelling, discharge, fevers, increasing pain and drainage from the injection site. This represents an emergency and you should contact our office immediately or seek treatment in the ER if after hours. If I have diabetes, will this injection affect me? If you are diabetic, an injection of a corticosteroid can raise your blood sugar level, requiring more insulin for a brief period of time. This may necessitate careful blood sugar maintenance. If the elevated sugars are not able to be controlled, contact your diabetic doctor for guidance.

## 2023-11-06 ENCOUNTER — OFFICE VISIT (OUTPATIENT)
Dept: FAMILY MEDICINE CLINIC | Facility: CLINIC | Age: 68
End: 2023-11-06
Payer: COMMERCIAL

## 2023-11-06 VITALS
SYSTOLIC BLOOD PRESSURE: 130 MMHG | HEIGHT: 69 IN | BODY MASS INDEX: 26.81 KG/M2 | WEIGHT: 181 LBS | OXYGEN SATURATION: 99 % | DIASTOLIC BLOOD PRESSURE: 74 MMHG | HEART RATE: 67 BPM | RESPIRATION RATE: 16 BRPM | TEMPERATURE: 97.5 F

## 2023-11-06 DIAGNOSIS — E55.9 VITAMIN D INSUFFICIENCY: ICD-10-CM

## 2023-11-06 DIAGNOSIS — Z79.899 ENCOUNTER FOR LONG-TERM (CURRENT) USE OF MEDICATIONS: ICD-10-CM

## 2023-11-06 DIAGNOSIS — R09.82 PND (POST-NASAL DRIP): ICD-10-CM

## 2023-11-06 DIAGNOSIS — E78.00 PURE HYPERCHOLESTEROLEMIA: Primary | ICD-10-CM
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
## 2023-11-06 DIAGNOSIS — Z23 ENCOUNTER FOR IMMUNIZATION: ICD-10-CM

## 2023-11-06 DIAGNOSIS — K30 FUNCTIONAL DYSPEPSIA: ICD-10-CM

## 2023-11-06 PROCEDURE — 99397 PER PM REEVAL EST PAT 65+ YR: CPT | Performed by: FAMILY MEDICINE

## 2023-11-06 NOTE — ASSESSMENT & PLAN NOTE
Patient is taking [id number] mg now once daily. He reduced it to a half dose, but his cholesterol went up now in the 220s mg/dL, will bring it down. Saving factor is high HDL is of 93 mg/dL.

## 2023-11-06 NOTE — ASSESSMENT & PLAN NOTE
All immunizations reviewed and he needs Prevnar 20, Tdap, and we discussed RSV, but I am advising against that at this time. He completed two doses of Shingrix but has not entered the second dose yet.

## 2023-11-06 NOTE — PROGRESS NOTES
Assessment and Plan:     1. Pure hypercholesterolemia  Assessment & Plan:  Patient is taking [id number] mg now once daily. He reduced it to a half dose, but his cholesterol went up now in the 220s mg/dL, will bring it down. Saving factor is high HDL is of 93 mg/dL. Orders:  -     Stress test only, exercise; Future; Expected date: 11/20/2023    2. Encounter for long-term (current) use of medications  Assessment & Plan:  All immunizations reviewed and he needs Prevnar 20, Tdap, and we discussed RSV, but I am advising against that at this time. He completed two doses of Shingrix but has not entered the second dose yet. 3. Vitamin D insufficiency  Assessment & Plan:  All medication reviewed for safety, efficacy, intolerance, vitamin D insufficiency. Patient taking, he believes vitamin D3 1000 international units daily. Continue current meds. Recheck level. 4. Functional dyspepsia  Assessment & Plan: This was diagnosed by GI and patient takes Pepcid complete as needed with good results. Orders:  -     Stress test only, exercise; Future; Expected date: 11/20/2023    5. Encounter for immunization  Assessment & Plan:  All immunizations reviewed and he needs Prevnar 20, Tdap, and we discussed RSV, but I am advising against that at this time. He completed two doses of Shingrix but has not entered the second dose yet. 6. PND (post-nasal drip)  Assessment & Plan:  Patient complains of hoarseness and gravel like speech on occasion, mild, not severe, advised to use Flonase nasal spray and discussed the "nose to toes to eye" method. 7. Exercise involving running  -     Stress test only, exercise; Future; Expected date: 11/20/2023      Immunizations and preventive care screenings were discussed with patient today. Appropriate education was printed on patient's after visit summary. Ordered stress test.     Counseling: Watch for carbohydrates and sweets intake. Increase fluid intake. History of Present Illness:     Becka Jimenez is a 70-year-old male and a well-known to me for many years who presents here for a comprehensive physical exam.     He inquires if he could do a routine stress test since his last stress test was 2 years ago, although he does not experience any chest pain. Patient has myositis, leg cramps. He also experiences cramps in his calf muscle. He is not on Medicare yet. He is aware that his cholesterol level has increased, he desired to have a repeat lipid panel check 3 months after. Social history. He exercises and goes biking. Current medication  He takes aspirin 81 mg, vitamin D 3000 international units. He is also taking flecainide 50 mg once a day. He is on Serax twice daily, although he is trying to taper down its dose, since he observes that he is doing well with Pepcid complete for his dyspepsia. He takes Mag 64 twice a day for his leg cramps. Immunization   He received influenza vaccine today and he is due for pneumococcal vaccine. He completed 2 doses of shingles vaccine however only 1 dose was recorded. His recent blood work was on 10/16/2023. His cholesterol level is 226 mg/dL. HDL is 93 mg/dL. CMP is normal. Sodium and potassium are normal. BUN, creatinine is normal. Liver test is normal, eGFR is 64 mL/min/1.73 m2.  Hemoglobin and white cell count are normal. PSA is normal.         Patient Care Team:  Caroline Vargas DO as PCP - General (Family Medicine)   Problem List:     Patient Active Problem List   Diagnosis   • PAC (premature atrial contraction)   • Pyriformis syndrome, unspecified laterality   • Lumbar radiculitis   • Displacement of lumbar intervertebral disc without myelopathy   • Nausea   • Burping   • Family history of colon cancer   • History of colon polyps   • Pure hypercholesterolemia   • Family history of coronary artery disease   • Subacromial impingement of left shoulder   • Malignant neoplasm of prostate [facility])   • Primary osteoarthritis of right knee   • Encounter for immunization   • Functional dyspepsia   • Vitamin D insufficiency   • PND (post-nasal drip)      Past Medical and Surgical History:     Past Medical History:   Diagnosis Date   • Colon polyp    • GERD (gastroesophageal reflux disease)    • Irregular heart beat     PAC     Past Surgical History:   Procedure Laterality Date   • CHOLECYSTECTOMY     • COLONOSCOPY     • EGD     • EGD AND COLONOSCOPY     • KNEE ARTHROSCOPY Bilateral    • KNEE SURGERY Left     benign tumor removed    • [place] LAPAROSCOPY SURG CHOLECYSTECTOMY N/A 10/9/2020    Procedure: LAPAROSCOPIC CHOLECYSTECTOMY, umbilical hernia repair;  Surgeon: Romy Oden MD;  Location: [facility];  Service: General   • PROSTATE SURGERY      2007      Family History:     Family History   Problem Relation Age of Onset   • Lymphoma Mother    • Lung cancer Father    • Heart disease Brother    • Other Brother         multisystem atrophy   • Colon cancer Maternal Grandmother       Social History:     Social History     Socioeconomic History   • Marital status: /Civil Union     Spouse name: None   • Number of children: None   • Years of education: None   • Highest education level: None   Occupational History   • None   Tobacco Use   • Smoking status: Never   • Smokeless tobacco: Never   Vaping Use   • Vaping Use: Never used   Substance and Sexual Activity   • Alcohol use:  Yes     Alcohol/week: 1.0 standard drink of alcohol     Types: 1 Glasses of wine per week   • Drug use: Never   • Sexual activity: None   Other Topics Concern   • None   Social History Narrative    Do you currently or have you served in the [address] [place] [address] [employer]:   No      Occupation:   Vascular Surgeon      Advance directive:   Yes      Social Determinants of Health     Financial Resource Strain: Not on file   Food Insecurity: Not on file   Transportation Needs: Not on file   Physical Activity: Not on file   Stress: Not on file   Social Connections: Not on file   Intimate Partner Violence: Not on file   Housing Stability: Not on file      Medications and Allergies:     Current Outpatient Medications   Medication Sig Dispense Refill   • aspirin 81 mg chewable tablet Chew     • Cholecalciferol (Dialyvite Vitamin D 5000) 125 MCG (5000 UT) capsule Take 5,000 Units by mouth daily       • flecainide (TAMBOCOR) 50 mg tablet TAKE 1 TABLET BY MOUTH TWICE A  tablet 3   • oxazepam (SERAX) 10 mg capsule Take 1 capsule (10 mg total) by mouth 2 (two) times a day 180 capsule 3   • rosuvastatin (CRESTOR) 10 MG tablet TAKE 1 TABLET BY MOUTH EVERY DAY 90 tablet 3   • tobramycin-dexamethasone (TOBRADEX) ophthalmic suspension Administer 1 drop into the left eye every 4 (four) hours while awake 5 mL 0     No current facility-administered medications for this visit. No Known Allergies   Immunizations:     Immunization History   Administered Date(s) Administered   • COVID-19 PFIZER VACCINE 0.3 ML IM 12/18/2020, 01/07/2021, 09/25/2021   • INFLUENZA 10/23/2017, 10/04/2020, 11/01/2021   • Influenza, high dose seasonal 0.7 mL 10/15/2020   • Zoster Vaccine Recombinant 01/25/2021      Health Maintenance:         Topic Date Due   • Colorectal Cancer Screening  09/01/2026   • Hepatitis C Screening  Completed         Topic Date Due   • DTaP,Tdap,and Td Vaccines (1 - Tdap) Never done   • Pneumococcal Vaccine: 65+ Years (1 - PCV) Never done   • COVID-19 Vaccine (4 - Pfizer series) 11/20/2021   • Influenza Vaccine (1) 09/01/2023        Physical Exam:     /74 (BP Location: Left arm, Patient Position: Sitting, Cuff Size: Standard)   Pulse 67   Temp 97.5 °F (36.4 °C) (Temporal)   Resp 16   Ht 5' 9" (1.753 m)   Wt 82.1 kg (181 lb)   SpO2 99%   BMI 26.73 kg/m²     Vitals and nursing note reviewed. Constitutional:       General: He is not in acute distress. Appearance: Normal appearance. He is well-developed. HENT:      Head: Normocephalic and atraumatic.    Eyes:      General: Right eye: No discharge. Left eye: No discharge. Neck:      Thyroid: No thyromegaly. Cardiovascular:      Rate and Rhythm: Normal rate and regular rhythm. Pulses: Normal pulses. Heart sounds: Normal heart sounds. No murmur heard. Pulmonary:      Effort: Pulmonary effort is normal.      Breath sounds: Normal breath sounds. No wheezing or rhonchi. Musculoskeletal:      Cervical back: Neck supple. Right lower leg: No edema. Left lower leg: No edema. Lymphadenopathy:      Cervical: No cervical adenopathy. Skin:     General: Skin is warm. Capillary Refill: Capillary refill takes less than 2 seconds. Neurological:      General: No focal deficit present. Mental Status: He is alert and oriented to person, place, and time. Psychiatric:         Mood and Affect: Mood normal.         Behavior: Behavior normal.         Thought Content: Thought content normal.   Medicare Screening Tests and Risk Assessments:     Annual Wellness Visit  No results found. I personally reviewed the recent (and prior)  lab results, the image studies, pathology, other specialty/physicians consult notes and recommendations, and outside medical records from other institutions, as appropriate. I had a lengthy discussion with the patient and shared the work-up findings. We discussed the diagnosis and management plan. I spent  45  minutes reviewing the records (labs, clinician notes, outside records, medical history, ordering medicine/tests/procedures, interpreting the imaging/labs previously done) and coordination of care as well as direct time with the patient today, of which greater than 50% of the time was spent in counseling and coordination of care with the patient/family. Iman June, DO    Transcribed for Iman June, DO, by Marley Rodrigues on 11/06/23 at 10:51 PM. Powered by [employer].

## 2023-11-06 NOTE — ASSESSMENT & PLAN NOTE
Patient complains of hoarseness and gravel like speech on occasion, mild, not severe, advised to use Flonase nasal spray and discussed the "nose to toes to eye" method.

## 2023-11-06 NOTE — ASSESSMENT & PLAN NOTE
All medication reviewed for safety, efficacy, intolerance, vitamin D insufficiency. Patient taking, he believes vitamin D3 1000 international units daily. Continue current meds. Recheck level.

## 2023-11-15 ENCOUNTER — DOCUMENTATION (OUTPATIENT)
Dept: VASCULAR SURGERY | Facility: CLINIC | Age: 68
End: 2023-11-15

## 2023-11-21 ENCOUNTER — HOSPITAL ENCOUNTER (OUTPATIENT)
Dept: NON INVASIVE DIAGNOSTICS | Facility: CLINIC | Age: 68
Discharge: HOME | End: 2023-11-21
Payer: COMMERCIAL

## 2023-11-21 VITALS
DIASTOLIC BLOOD PRESSURE: 78 MMHG | HEIGHT: 69 IN | SYSTOLIC BLOOD PRESSURE: 128 MMHG | WEIGHT: 181 LBS | BODY MASS INDEX: 26.81 KG/M2 | HEART RATE: 78 BPM | OXYGEN SATURATION: 97 %
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
## 2023-11-21 DIAGNOSIS — K30 FUNCTIONAL DYSPEPSIA: ICD-10-CM

## 2023-11-21 DIAGNOSIS — E78.00 PURE HYPERCHOLESTEROLEMIA: ICD-10-CM

## 2023-11-21 LAB
MAX HR PERCENT: 100 %
MAX HR PERCENT: 110 %
MAX HR: 166 BPM
MAX HR: 166 BPM
RATE PRESSURE PRODUCT: NORMAL
SL CV STRESS RECOVERY BP: NORMAL MMHG
SL CV STRESS RECOVERY HR: 82 BPM
SL CV STRESS RECOVERY O2 SAT: 100 %
SL CV STRESS STAGE REACHED: 4
STRESS ANGINA INDEX: 0
STRESS BASELINE BP: NORMAL MMHG
STRESS BASELINE BP: NORMAL MMHG
STRESS BASELINE HR: 64 BPM
STRESS BASELINE HR: 78 BPM
STRESS O2 SAT REST: 97 %
STRESS PEAK HR: 166 BPM
STRESS POST ESTIMATED WORKLOAD: 13.4 METS
STRESS POST ESTIMATED WORKLOAD: 13.4 METS
STRESS POST EXERCISE DUR MIN: 11 MIN
STRESS POST EXERCISE DUR SEC: 0 SEC
STRESS POST O2 SAT PEAK: 100 %
STRESS POST PEAK BP: 188 MMHG
STRESS POST PEAK HR: 166 BPM
STRESS POST PEAK SYSTOLIC BP: 188 MMHG

## 2023-11-21 PROCEDURE — 93018 CV STRESS TEST I&R ONLY: CPT | Performed by: INTERNAL MEDICINE

## 2023-11-21 PROCEDURE — 93016 CV STRESS TEST SUPVJ ONLY: CPT | Performed by: INTERNAL MEDICINE

## 2023-11-21 PROCEDURE — 93017 CV STRESS TEST TRACING ONLY: CPT

## 2023-11-28 DIAGNOSIS — N52.1 ERECTILE DYSFUNCTION DUE TO DISEASES CLASSIFIED ELSEWHERE: Primary | ICD-10-CM

## 2023-11-28 RX ORDER — SILDENAFIL 100 MG/1
100 TABLET, FILM COATED ORAL DAILY PRN
Qty: 20 TABLET | Refills: 3 | Status: SHIPPED | OUTPATIENT
Start: 2023-11-28

## 2023-11-29 NOTE — PROGRESS NOTES
Patient called and requested to try Viagra and we discussed this as he did with his cardiologist.  Will start taking half tablet 1 hour prior to need and as directed thereafter

## 2023-12-20 ENCOUNTER — OFFICE VISIT (OUTPATIENT)
Dept: OBGYN CLINIC | Facility: HOSPITAL | Age: 68
End: 2023-12-20
Payer: COMMERCIAL

## 2023-12-20 VITALS
DIASTOLIC BLOOD PRESSURE: 85 MMHG | HEIGHT: 69 IN | WEIGHT: 189 LBS | SYSTOLIC BLOOD PRESSURE: 152 MMHG | BODY MASS INDEX: 27.99 KG/M2 | HEART RATE: 64 BPM

## 2023-12-20 DIAGNOSIS — M65.332 TRIGGER MIDDLE FINGER OF LEFT HAND: Primary | ICD-10-CM

## 2023-12-20 PROCEDURE — 99214 OFFICE O/P EST MOD 30 MIN: CPT | Performed by: ORTHOPAEDIC SURGERY

## 2023-12-20 PROCEDURE — 20550 NJX 1 TENDON SHEATH/LIGAMENT: CPT | Performed by: ORTHOPAEDIC SURGERY

## 2023-12-20 RX ORDER — BETAMETHASONE SODIUM PHOSPHATE AND BETAMETHASONE ACETATE 3; 3 MG/ML; MG/ML
6 INJECTION, SUSPENSION INTRA-ARTICULAR; INTRALESIONAL; INTRAMUSCULAR; SOFT TISSUE
Status: COMPLETED | OUTPATIENT
Start: 2023-12-20 | End: 2023-12-20

## 2023-12-20 RX ORDER — LIDOCAINE HYDROCHLORIDE 10 MG/ML
1 INJECTION, SOLUTION INFILTRATION; PERINEURAL
Status: COMPLETED | OUTPATIENT
Start: 2023-12-20 | End: 2023-12-20

## 2023-12-20 RX ADMIN — BETAMETHASONE SODIUM PHOSPHATE AND BETAMETHASONE ACETATE 6 MG: 3; 3 INJECTION, SUSPENSION INTRA-ARTICULAR; INTRALESIONAL; INTRAMUSCULAR; SOFT TISSUE at 09:00

## 2023-12-20 RX ADMIN — LIDOCAINE HYDROCHLORIDE 1 ML: 10 INJECTION, SOLUTION INFILTRATION; PERINEURAL at 09:00

## 2023-12-20 NOTE — PROGRESS NOTES
ASSESSMENT/PLAN:    Assessment:   Left long trigger finger    Plan:   Discussed treatment options including continued observation, activity modification, cortisone injection versus surgical intervention.  Patient wishes to proceed with cortisone injection to the A1 pulley of the left long finger today in the office.  He may continue activities as tolerated.    Follow Up:  6  week(s)    To Do Next Visit:  Re-evaluation    General Discussions:  Trigger FInger: The anatomy and physiology of trigger finger was discussed with the patient today in the office.  Edema and increased contact pressure within the flexor tendons at the A1 pulley can cause pain, crepitation, and limitation of function.  Treatment options include resting MP blocking splints to decrease edema, oral anti-inflammatory medications, home or formal therapy exercises, up to 2 steroid injections within the tendon sheath, or surgical release.  While majority of patients do respond to conservative treatment, up to 20% may require surgical release.     _____________________________________________________  CHIEF COMPLAINT:  Chief Complaint   Patient presents with    Left Middle Finger - Follow-up, Triggering     #1 injection 9/13/23          SUBJECTIVE:  Alex Arthur is a 68 y.o. male who presents with left long finger catching.  He previously had an injection that worked well for 3 months but it started to come back with a click in the morning.     PAST MEDICAL HISTORY:  Past Medical History:   Diagnosis Date    Colon polyp     GERD (gastroesophageal reflux disease)     Irregular heart beat     PAC       PAST SURGICAL HISTORY:  Past Surgical History:   Procedure Laterality Date    CHOLECYSTECTOMY      COLONOSCOPY      EGD      EGD AND COLONOSCOPY      KNEE ARTHROSCOPY Bilateral     KNEE SURGERY Left     benign tumor removed     [place] LAPAROSCOPY SURG CHOLECYSTECTOMY N/A 10/9/2020    Procedure: LAPAROSCOPIC CHOLECYSTECTOMY, umbilical hernia repair;  Surgeon:  Yo Leyva MD;  [facility];  Service: General    PROSTATE SURGERY      2007       FAMILY HISTORY:  Family History   Problem Relation Age of Onset    Lymphoma Mother     Lung cancer Father     Heart disease Brother     Other Brother         multisystem atrophy    Colon cancer Maternal Grandmother        SOCIAL HISTORY:  Social History     Tobacco Use    Smoking status: Never    Smokeless tobacco: Never   Vaping Use    Vaping status: Never Used   Substance Use Topics    Alcohol use: Yes     Alcohol/week: 1.0 standard drink of alcohol     Types: 1 Glasses of wine per week    Drug use: Never       MEDICATIONS:    Current Outpatient Medications:     aspirin 81 mg chewable tablet, Chew, Disp: , Rfl:     Cholecalciferol (Dialyvite Vitamin D 5000) 125 MCG (5000 UT) capsule, Take 5,000 Units by mouth daily  , Disp: , Rfl:     flecainide (TAMBOCOR) 50 mg tablet, TAKE 1 TABLET BY MOUTH TWICE A DAY, Disp: 180 tablet, Rfl: 3    omeprazole (PriLOSEC) 20 mg delayed release capsule, Take 1 capsule (20 mg total) by mouth daily, Disp: 30 capsule, Rfl: 11    oxazepam (SERAX) 10 mg capsule, Take 1 capsule (10 mg total) by mouth 2 (two) times a day, Disp: 180 capsule, Rfl: 3    rosuvastatin (CRESTOR) 10 MG tablet, TAKE 1 TABLET BY MOUTH EVERY DAY, Disp: 90 tablet, Rfl: 3    sildenafil (VIAGRA) 100 mg tablet, Take 1 tablet (100 mg total) by mouth daily as needed for erectile dysfunction, Disp: 20 tablet, Rfl: 3    tobramycin-dexamethasone (TOBRADEX) ophthalmic suspension, Administer 1 drop into the left eye every 4 (four) hours while awake, Disp: 5 mL, Rfl: 0    ALLERGIES:  No Known Allergies    REVIEW OF SYSTEMS:  Pertinent items are noted in HPI.  A comprehensive review of systems was negative.    LABS:  HgA1c:   Lab Results   Component Value Date    HGBA1C 5.6 08/01/2019     BMP:   Lab Results   Component Value Date    GLUCOSE 107 06/11/2015    CALCIUM 9.5 10/16/2023     06/11/2015    K 3.8 10/16/2023    CO2 31  "10/16/2023     10/16/2023    BUN 17 10/16/2023    CREATININE 1.14 10/16/2023       _____________________________________________________  PHYSICAL EXAMINATION:  Vital signs: /85   Pulse 64   Ht 5' 9\" (1.753 m)   Wt 85.7 kg (189 lb)   BMI 27.91 kg/m²   General: well developed and well nourished, alert, oriented times 3 and appears comfortable  Psychiatric: Normal  HEENT: Trachea Midline, No torticollis  Cardiovascular: No discernable arrhythmia  Pulmonary: No wheezing or stridor  Abdomen: No rebound or guarding  Extremities: No peripheral edema  Skin: No masses, erythema, lacerations, fluctation, ulcerations  Neurovascular: Sensation Intact to the Median, Ulnar, Radial Nerve, Motor Intact to the Median, Ulnar, Radial Nerve and Pulses Intact    MUSCULOSKELETAL EXAMINATION:  left long finger:  Positive palpable nodule over the A1 pulley.  Positive tenderness to palpation over A1 pulley. Negative clicking.   Negative catching.       _____________________________________________________  STUDIES REVIEWED:  No Studies to review      PROCEDURES PERFORMED:  Hand/upper extremity injection  Universal Protocol:  Consent given by: patient  Supporting Documentation  Indications: pain   Procedure Details  Condition:trigger finger Needle size: 25 G  Ultrasound guidance: no  Medications administered: 1 mL lidocaine 1 %; 6 mg betamethasone acetate-betamethasone sodium phosphate 6 (3-3) mg/mL  Patient tolerance: patient tolerated the procedure well with no immediate complications  Dressing:  Sterile dressing applied                 Scribe Attestation      I,:  Saqib Diaz PA-C am acting as a scribe while in the presence of the attending physician.:       I,:  Taj Ivy MD personally performed the services described in this documentation    as scribed in my presence.:             "

## 2023-12-26 DIAGNOSIS — K30 FUNCTIONAL DYSPEPSIA: ICD-10-CM

## 2023-12-27 RX ORDER — OXAZEPAM 10 MG
10 CAPSULE ORAL 2 TIMES DAILY
Qty: 180 CAPSULE | Refills: 2 | Status: SHIPPED | OUTPATIENT
Start: 2023-12-27

## 2024-01-14 DIAGNOSIS — I49.1 PAC (PREMATURE ATRIAL CONTRACTION): ICD-10-CM

## 2024-01-19 RX ORDER — FLECAINIDE ACETATE 50 MG/1
TABLET ORAL
Qty: 180 TABLET | Refills: 3 | Status: SHIPPED | OUTPATIENT
Start: 2024-01-19

## 2024-01-31 ENCOUNTER — TELEPHONE (OUTPATIENT)
Dept: FAMILY MEDICINE CLINIC | Facility: CLINIC | Age: 69
End: 2024-01-31

## 2024-01-31 DIAGNOSIS — M17.11 PRIMARY OSTEOARTHRITIS OF RIGHT KNEE: ICD-10-CM

## 2024-01-31 DIAGNOSIS — M54.16 LUMBAR RADICULITIS: ICD-10-CM

## 2024-01-31 DIAGNOSIS — E78.00 PURE HYPERCHOLESTEROLEMIA: Primary | ICD-10-CM

## 2024-02-12 ENCOUNTER — APPOINTMENT (OUTPATIENT)
Dept: LAB | Facility: HOSPITAL | Age: 69
End: 2024-02-12
Payer: COMMERCIAL

## 2024-02-12 DIAGNOSIS — M17.11 PRIMARY OSTEOARTHRITIS OF RIGHT KNEE: ICD-10-CM

## 2024-02-12 DIAGNOSIS — M54.16 LUMBAR RADICULITIS: ICD-10-CM

## 2024-02-12 DIAGNOSIS — E78.00 PURE HYPERCHOLESTEROLEMIA: ICD-10-CM

## 2024-02-12 LAB
ALBUMIN SERPL BCP-MCNC: 4.5 G/DL (ref 3.5–5)
ALP SERPL-CCNC: 42 U/L (ref 34–104)
ALT SERPL W P-5'-P-CCNC: 17 U/L (ref 7–52)
ANION GAP SERPL CALCULATED.3IONS-SCNC: 6 MMOL/L
AST SERPL W P-5'-P-CCNC: 19 U/L (ref 13–39)
BILIRUB SERPL-MCNC: 0.79 MG/DL (ref 0.2–1)
BUN SERPL-MCNC: 16 MG/DL (ref 5–25)
CALCIUM SERPL-MCNC: 9.9 MG/DL (ref 8.4–10.2)
CHLORIDE SERPL-SCNC: 105 MMOL/L (ref 96–108)
CHOLEST SERPL-MCNC: 171 MG/DL
CK SERPL-CCNC: 68 U/L (ref 39–308)
CO2 SERPL-SCNC: 30 MMOL/L (ref 21–32)
CREAT SERPL-MCNC: 1.25 MG/DL (ref 0.6–1.3)
CRP SERPL QL: 1.6 MG/L
GFR SERPL CREATININE-BSD FRML MDRD: 57 ML/MIN/1.73SQ M
GLUCOSE P FAST SERPL-MCNC: 99 MG/DL (ref 65–99)
HDLC SERPL-MCNC: 75 MG/DL
LDLC SERPL CALC-MCNC: 78 MG/DL (ref 0–100)
NONHDLC SERPL-MCNC: 96 MG/DL
POTASSIUM SERPL-SCNC: 4.7 MMOL/L (ref 3.5–5.3)
PROT SERPL-MCNC: 7.1 G/DL (ref 6.4–8.4)
SODIUM SERPL-SCNC: 141 MMOL/L (ref 135–147)
TRIGL SERPL-MCNC: 92 MG/DL

## 2024-02-12 PROCEDURE — 80061 LIPID PANEL: CPT

## 2024-02-12 PROCEDURE — 86140 C-REACTIVE PROTEIN: CPT

## 2024-02-12 PROCEDURE — 36415 COLL VENOUS BLD VENIPUNCTURE: CPT

## 2024-02-12 PROCEDURE — 82550 ASSAY OF CK (CPK): CPT

## 2024-02-12 PROCEDURE — 80053 COMPREHEN METABOLIC PANEL: CPT

## 2024-03-23 DIAGNOSIS — E78.2 MIXED HYPERLIPIDEMIA: ICD-10-CM

## 2024-03-25 ENCOUNTER — TELEPHONE (OUTPATIENT)
Dept: CARDIOLOGY CLINIC | Facility: CLINIC | Age: 69
End: 2024-03-25

## 2024-03-25 RX ORDER — ROSUVASTATIN CALCIUM 10 MG/1
10 TABLET, COATED ORAL DAILY
Qty: 90 TABLET | Refills: 0 | Status: SHIPPED | OUTPATIENT
Start: 2024-03-25

## 2024-03-28 DIAGNOSIS — K30 FUNCTIONAL DYSPEPSIA: Primary | ICD-10-CM

## 2024-03-28 RX ORDER — CLONAZEPAM 0.5 MG/1
0.5 TABLET ORAL 2 TIMES DAILY
Qty: 60 TABLET | Refills: 3 | Status: SHIPPED | OUTPATIENT
Start: 2024-03-28

## 2024-04-01 ENCOUNTER — OFFICE VISIT (OUTPATIENT)
Dept: OBGYN CLINIC | Facility: CLINIC | Age: 69
End: 2024-04-01
Payer: COMMERCIAL

## 2024-04-01 VITALS
HEART RATE: 60 BPM | DIASTOLIC BLOOD PRESSURE: 81 MMHG | SYSTOLIC BLOOD PRESSURE: 132 MMHG | WEIGHT: 191.2 LBS | HEIGHT: 69 IN | BODY MASS INDEX: 28.32 KG/M2

## 2024-04-01 DIAGNOSIS — M65.332 TRIGGER MIDDLE FINGER OF LEFT HAND: Primary | ICD-10-CM

## 2024-04-01 PROCEDURE — 99214 OFFICE O/P EST MOD 30 MIN: CPT | Performed by: ORTHOPAEDIC SURGERY

## 2024-04-01 NOTE — PROGRESS NOTES
ASSESSMENT/PLAN:    Assessment:   Left long trigger finger    Plan:   It was discussed that we will continue conservative treatment at this time.   He was advised about a figure 8 splint  We did discuss surgery    Follow Up:      To Do Next Visit:  Re-evaluation    General Discussions:  Trigger FInger: The anatomy and physiology of trigger finger was discussed with the patient today in the office.  Edema and increased contact pressure within the flexor tendons at the A1 pulley can cause pain, crepitation, and limitation of function.  Treatment options include resting MP blocking splints to decrease edema, oral anti-inflammatory medications, home or formal therapy exercises, up to 2 steroid injections within the tendon sheath, or surgical release.  While majority of patients do respond to conservative treatment, up to 20% may require surgical release.     _____________________________________________________  CHIEF COMPLAINT:  Chief Complaint   Patient presents with    Left Middle Finger - Follow-up, Triggering     TF- 2nd injection- 12/20/23         SUBJECTIVE:  Alex Arthur is a 68 y.o. male who presents with left long finger catching.  He states the injection worked but then it started to come back. He states that the clicking and locking initially when the appointment was made but then the symptoms seem to resolve.      PAST MEDICAL HISTORY:  Past Medical History:   Diagnosis Date    Colon polyp     GERD (gastroesophageal reflux disease)     Irregular heart beat     PAC       PAST SURGICAL HISTORY:  Past Surgical History:   Procedure Laterality Date    CHOLECYSTECTOMY      COLONOSCOPY      EGD      EGD AND COLONOSCOPY      KNEE ARTHROSCOPY Bilateral     KNEE SURGERY Left     benign tumor removed     [place] LAPAROSCOPY SURG CHOLECYSTECTOMY N/A 10/9/2020    Procedure: LAPAROSCOPIC CHOLECYSTECTOMY, umbilical hernia repair;  Surgeon: Yo Leyva MD;  [facility];  Service: General    PROSTATE SURGERY      2007        FAMILY HISTORY:  Family History   Problem Relation Age of Onset    Lymphoma Mother     Lung cancer Father     Heart disease Brother     Other Brother         multisystem atrophy    Colon cancer Maternal Grandmother        SOCIAL HISTORY:  Social History     Tobacco Use    Smoking status: Never    Smokeless tobacco: Never   Vaping Use    Vaping status: Never Used   Substance Use Topics    Alcohol use: Yes     Alcohol/week: 1.0 standard drink of alcohol     Types: 1 Glasses of wine per week    Drug use: Never       MEDICATIONS:    Current Outpatient Medications:     aspirin 81 mg chewable tablet, Chew, Disp: , Rfl:     Cholecalciferol (Dialyvite Vitamin D 5000) 125 MCG (5000 UT) capsule, Take 5,000 Units by mouth daily  , Disp: , Rfl:     clonazePAM (KlonoPIN) 0.5 mg tablet, Take 1 tablet (0.5 mg total) by mouth 2 (two) times a day, Disp: 60 tablet, Rfl: 3    flecainide (TAMBOCOR) 50 mg tablet, TAKE 1 TABLET BY MOUTH TWICE A DAY, Disp: 180 tablet, Rfl: 3    omeprazole (PriLOSEC) 20 mg delayed release capsule, TAKE 1 CAPSULE BY MOUTH EVERY DAY, Disp: 90 capsule, Rfl: 4    rosuvastatin (CRESTOR) 10 MG tablet, Take 1 tablet (10 mg total) by mouth daily, Disp: 90 tablet, Rfl: 0    sildenafil (VIAGRA) 100 mg tablet, Take 1 tablet (100 mg total) by mouth daily as needed for erectile dysfunction, Disp: 20 tablet, Rfl: 3    tobramycin-dexamethasone (TOBRADEX) ophthalmic suspension, Administer 1 drop into the left eye every 4 (four) hours while awake, Disp: 5 mL, Rfl: 0    ALLERGIES:  No Known Allergies    REVIEW OF SYSTEMS:  Pertinent items are noted in HPI.  A comprehensive review of systems was negative.    LABS:  HgA1c:   Lab Results   Component Value Date    HGBA1C 5.6 08/01/2019     BMP:   Lab Results   Component Value Date    GLUCOSE 107 06/11/2015    CALCIUM 9.9 02/12/2024     06/11/2015    K 4.7 02/12/2024    CO2 30 02/12/2024     02/12/2024    BUN 16 02/12/2024    CREATININE 1.25 02/12/2024  "      _____________________________________________________  PHYSICAL EXAMINATION:  Vital signs: /81   Pulse 60   Ht 5' 9\" (1.753 m)   Wt 86.7 kg (191 lb 3.2 oz)   BMI 28.24 kg/m²   General: well developed and well nourished, alert, oriented times 3 and appears comfortable  Psychiatric: Normal  HEENT: Trachea Midline, No torticollis  Cardiovascular: No discernable arrhythmia  Pulmonary: No wheezing or stridor  Abdomen: No rebound or guarding  Extremities: No peripheral edema  Skin: No masses, erythema, lacerations, fluctation, ulcerations  Neurovascular: Sensation Intact to the Median, Ulnar, Radial Nerve, Motor Intact to the Median, Ulnar, Radial Nerve and Pulses Intact    MUSCULOSKELETAL EXAMINATION:  left long finger:  Positive palpable nodule over the A1 pulley.  Positive tenderness to palpation over A1 pulley. Negative clicking.   Negative catching.     Index finger also has a click but no pain.       _____________________________________________________  STUDIES REVIEWED:  No Studies to review      PROCEDURES PERFORMED:  Procedures  No additional procedures performed at today's visit       Scribe Attestation      I,:   am acting as a scribe while in the presence of the attending physician.:       I,:   personally performed the services described in this documentation    as scribed in my presence.:             "

## 2024-04-09 ENCOUNTER — OFFICE VISIT (OUTPATIENT)
Dept: CARDIOLOGY CLINIC | Facility: CLINIC | Age: 69
End: 2024-04-09
Payer: COMMERCIAL

## 2024-04-09 VITALS
HEART RATE: 53 BPM | BODY MASS INDEX: 27.46 KG/M2 | SYSTOLIC BLOOD PRESSURE: 132 MMHG | HEIGHT: 69 IN | DIASTOLIC BLOOD PRESSURE: 88 MMHG | WEIGHT: 185.4 LBS

## 2024-04-09 DIAGNOSIS — I49.1 PAC (PREMATURE ATRIAL CONTRACTION): Primary | ICD-10-CM

## 2024-04-09 PROCEDURE — 99214 OFFICE O/P EST MOD 30 MIN: CPT | Performed by: INTERNAL MEDICINE

## 2024-04-09 PROCEDURE — 93000 ELECTROCARDIOGRAM COMPLETE: CPT | Performed by: INTERNAL MEDICINE

## 2024-04-09 RX ORDER — FLECAINIDE ACETATE 50 MG/1
50 TABLET ORAL DAILY
Start: 2024-04-09

## 2024-04-09 NOTE — PROGRESS NOTES
Cardiology             Alexkaylee Arthur  1955  [id number]              Assessment/Plan:    Elevated ASCVD risk: 10-year risk of ASCVD up to 14.8%, likely higher due to elevated calcium score at 516 on prior study.  He has been initiated and stabilized on rosuvastatin 10 mg daily.  His most recent LDL cholesterol is stable at 78 mg/dL.  He has been using the elliptical machine for an hour daily and feeling very well without exertional symptoms.  Low-dose aspirin will be continued.  He continues to consume a heart healthy diet and is very active.  Today's ECG reveals sinus bradycardia with first-degree AV block.  Will continue to monitor this.  He is asymptomatic.      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  He had a calcium scoring about 10 years ago which was in the 50s.  Around 2015 he started on low-dose rosuvastatin.  He has been taking 5 mg 3 times weekly.  Subsequently, on 03/02/2021 repeat coronary artery calcium score was 516, with most of the calcium in the right coronary artery and right marginal branches.  Subsequent stress echocardiogram 03/02/2021 was within normal limits.  He had a very good functional capacity, walking 11 minutes on Adalberto protocol achieving 111% of maximal predicted heart rate with no chest pain or ischemic ECG/echocardiographic changes.     Prior lipid panel 07/20/2021 demonstrated LDL cholesterol 75 mg/dL, HDL 78 mg/dL, triglycerides 84 mg/dL.  His highest LDL level on our record was in 2017 at 115 mg/dL.     Subsequently, his rosuvastatin was increased to 10 mg daily.      He presents today for follow-up with no complaints.  He denies chest pain, shortness of breath, dizziness, palpitations, lower extreme edema.               Vitals:  Vitals:    04/09/24 0947   BP: 132/88   BP Location: Right arm   Patient Position: Sitting   Cuff Size: Adult   Pulse: (!) 53   Weight: 84.1 kg (185 lb 6.4  "oz)   Height: 5' 9\" (1.753 m)         Past Medical History:   Diagnosis Date   • Colon polyp    • GERD (gastroesophageal reflux disease)    • Irregular heart beat     PAC     Social History     Socioeconomic History   • Marital status: /Civil Union     Spouse name: Not on file   • Number of children: Not on file   • Years of education: Not on file   • Highest education level: Not on file   Occupational History   • Not on file   Tobacco Use   • Smoking status: Never   • Smokeless tobacco: Never   Vaping Use   • Vaping status: Never Used   Substance and Sexual Activity   • Alcohol use: Yes     Alcohol/week: 1.0 standard drink of alcohol     Types: 1 Glasses of wine per week   • Drug use: Never   • Sexual activity: Not on file   Other Topics Concern   • Not on file   Social History Narrative    Do you currently or have you served in the [employer]:   No      Occupation:   Vascular Surgeon      Advance directive:   Yes      Social Determinants of Health     Financial Resource Strain: Not on file   Food Insecurity: Not on file   Transportation Needs: Not on file   Physical Activity: Not on file   Stress: Not on file   Social Connections: Not on file   Intimate Partner Violence: Not on file   Housing Stability: Not on file      Family History   Problem Relation Age of Onset   • Lymphoma Mother    • Lung cancer Father    • Heart disease Brother    • Other Brother         multisystem atrophy   • Colon cancer Maternal Grandmother      Past Surgical History:   Procedure Laterality Date   • CHOLECYSTECTOMY     • COLONOSCOPY     • EGD     • EGD AND COLONOSCOPY     • KNEE ARTHROSCOPY Bilateral    • KNEE SURGERY Left     benign tumor removed    • IN LAPAROSCOPY SURG CHOLECYSTECTOMY N/A 10/9/2020    Procedure: LAPAROSCOPIC CHOLECYSTECTOMY, umbilical hernia repair;  Surgeon: Yo Leyva MD;  Location: [facility];  Service: General   • PROSTATE SURGERY      2007       Current Outpatient Medications:   •  aspirin 81 mg " chewable tablet, Chew, Disp: , Rfl:   •  Cholecalciferol (Dialyvite Vitamin D 5000) 125 MCG (5000 UT) capsule, Take 5,000 Units by mouth daily  , Disp: , Rfl:   •  clonazePAM (KlonoPIN) 0.5 mg tablet, Take 1 tablet (0.5 mg total) by mouth 2 (two) times a day, Disp: 60 tablet, Rfl: 3  •  flecainide (TAMBOCOR) 50 mg tablet, Take 1 tablet (50 mg total) by mouth in the morning, Disp: , Rfl:   •  omeprazole (PriLOSEC) 20 mg delayed release capsule, TAKE 1 CAPSULE BY MOUTH EVERY DAY, Disp: 90 capsule, Rfl: 4  •  rosuvastatin (CRESTOR) 10 MG tablet, Take 1 tablet (10 mg total) by mouth daily, Disp: 90 tablet, Rfl: 0  •  sildenafil (VIAGRA) 100 mg tablet, Take 1 tablet (100 mg total) by mouth daily as needed for erectile dysfunction, Disp: 20 tablet, Rfl: 3  •  tobramycin-dexamethasone (TOBRADEX) ophthalmic suspension, Administer 1 drop into the left eye every 4 (four) hours while awake, Disp: 5 mL, Rfl: 0        Review of Systems:  Review of Systems   Respiratory: Negative.     Cardiovascular: Negative.    All other systems reviewed and are negative.        Physical Exam:  Physical Exam  Constitutional:       General: He is not in acute distress.     Appearance: He is well-developed. He is not diaphoretic.   HENT:      Head: Normocephalic and atraumatic.   Eyes:      General: No scleral icterus.        Right eye: No discharge.      Pupils: Pupils are equal, round, and reactive to light.   Neck:      Thyroid: No thyromegaly.   Cardiovascular:      Rate and Rhythm: Regular rhythm. Bradycardia present.      Heart sounds: Normal heart sounds. No murmur heard.     No friction rub. No gallop.   Pulmonary:      Effort: Pulmonary effort is normal.      Breath sounds: Normal breath sounds.   Abdominal:      General: There is no distension.      Tenderness: There is no abdominal tenderness. There is no guarding or rebound.   Musculoskeletal:         General: Normal range of motion.      Cervical back: Normal range of motion and neck  supple.   Skin:     General: Skin is warm and dry.      Coloration: Skin is not pale.      Findings: No erythema or rash.   Neurological:      Mental Status: He is alert and oriented to person, place, and time.      Coordination: Coordination normal.      Gait: Gait abnormal.   Psychiatric:         Behavior: Behavior normal.         Thought Content: Thought content normal.         Judgment: Judgment normal.         This note was completed in part utilizing M-Modal Fluency Direct Software.  Grammatical errors, random word insertions, spelling mistakes, and incomplete sentences can be an occasional consequence of this system secondary to software limitations, ambient noise, and hardware issues.  If you have any questions or concerns about the content, text, or information contained within the body of this dictation, please contact the provider for clarification.

## 2024-04-23 DIAGNOSIS — K30 FUNCTIONAL DYSPEPSIA: Primary | ICD-10-CM

## 2024-04-23 RX ORDER — OXAZEPAM 10 MG
20 CAPSULE ORAL
Qty: 180 CAPSULE | Refills: 3 | Status: SHIPPED | OUTPATIENT
Start: 2024-04-23

## 2024-05-28 ENCOUNTER — APPOINTMENT (EMERGENCY)
Dept: CT IMAGING | Facility: HOSPITAL | Age: 69
End: 2024-05-28
Payer: COMMERCIAL

## 2024-05-28 ENCOUNTER — APPOINTMENT (EMERGENCY)
Dept: RADIOLOGY | Facility: HOSPITAL | Age: 69
End: 2024-05-28
Payer: COMMERCIAL

## 2024-05-28 ENCOUNTER — HOSPITAL ENCOUNTER (EMERGENCY)
Facility: HOSPITAL | Age: 69
End: 2024-05-29
Attending: EMERGENCY MEDICINE
Payer: COMMERCIAL

## 2024-05-28 DIAGNOSIS — R10.9 ABDOMINAL PAIN: Primary | ICD-10-CM

## 2024-05-28 DIAGNOSIS — D64.9 ANEMIA: ICD-10-CM

## 2024-05-28 DIAGNOSIS — I71.40: ICD-10-CM

## 2024-05-28 DIAGNOSIS — K66.1 HEMOPERITONEUM: ICD-10-CM

## 2024-05-28 LAB
ALBUMIN SERPL BCP-MCNC: 4.2 G/DL (ref 3.5–5)
ALP SERPL-CCNC: 46 U/L (ref 34–104)
ALT SERPL W P-5'-P-CCNC: 19 U/L (ref 7–52)
ANION GAP SERPL CALCULATED.3IONS-SCNC: 7 MMOL/L (ref 4–13)
AST SERPL W P-5'-P-CCNC: 17 U/L (ref 13–39)
ATRIAL RATE: 71 BPM
BASOPHILS # BLD AUTO: 0.03 THOUSANDS/ÂΜL (ref 0–0.1)
BASOPHILS NFR BLD AUTO: 0 % (ref 0–1)
BILIRUB SERPL-MCNC: 0.35 MG/DL (ref 0.2–1)
BUN SERPL-MCNC: 19 MG/DL (ref 5–25)
CALCIUM SERPL-MCNC: 8.9 MG/DL (ref 8.4–10.2)
CARDIAC TROPONIN I PNL SERPL HS: 9 NG/L
CHLORIDE SERPL-SCNC: 104 MMOL/L (ref 96–108)
CO2 SERPL-SCNC: 29 MMOL/L (ref 21–32)
CREAT SERPL-MCNC: 1.25 MG/DL (ref 0.6–1.3)
EOSINOPHIL # BLD AUTO: 0.12 THOUSAND/ÂΜL (ref 0–0.61)
EOSINOPHIL NFR BLD AUTO: 1 % (ref 0–6)
ERYTHROCYTE [DISTWIDTH] IN BLOOD BY AUTOMATED COUNT: 11.9 % (ref 11.6–15.1)
GFR SERPL CREATININE-BSD FRML MDRD: 57 ML/MIN/1.73SQ M
GLUCOSE SERPL-MCNC: 146 MG/DL (ref 65–140)
HCT VFR BLD AUTO: 36 % (ref 36.5–49.3)
HGB BLD-MCNC: 11.5 G/DL (ref 12–17)
IMM GRANULOCYTES # BLD AUTO: 0.06 THOUSAND/UL (ref 0–0.2)
IMM GRANULOCYTES NFR BLD AUTO: 1 % (ref 0–2)
LIPASE SERPL-CCNC: 28 U/L (ref 11–82)
LYMPHOCYTES # BLD AUTO: 2.74 THOUSANDS/ÂΜL (ref 0.6–4.47)
LYMPHOCYTES NFR BLD AUTO: 32 % (ref 14–44)
MCH RBC QN AUTO: 29 PG (ref 26.8–34.3)
MCHC RBC AUTO-ENTMCNC: 31.9 G/DL (ref 31.4–37.4)
MCV RBC AUTO: 91 FL (ref 82–98)
MONOCYTES # BLD AUTO: 0.72 THOUSAND/ÂΜL (ref 0.17–1.22)
MONOCYTES NFR BLD AUTO: 8 % (ref 4–12)
NEUTROPHILS # BLD AUTO: 4.98 THOUSANDS/ÂΜL (ref 1.85–7.62)
NEUTS SEG NFR BLD AUTO: 58 % (ref 43–75)
NRBC BLD AUTO-RTO: 0 /100 WBCS
P AXIS: 13 DEGREES
PLATELET # BLD AUTO: 282 THOUSANDS/UL (ref 149–390)
PMV BLD AUTO: 9.1 FL (ref 8.9–12.7)
POTASSIUM SERPL-SCNC: 3.1 MMOL/L (ref 3.5–5.3)
PR INTERVAL: 186 MS
PROT SERPL-MCNC: 6.5 G/DL (ref 6.4–8.4)
QRS AXIS: 31 DEGREES
QRSD INTERVAL: 92 MS
QT INTERVAL: 416 MS
QTC INTERVAL: 452 MS
RBC # BLD AUTO: 3.96 MILLION/UL (ref 3.88–5.62)
SODIUM SERPL-SCNC: 140 MMOL/L (ref 135–147)
T WAVE AXIS: -7 DEGREES
VENTRICULAR RATE: 71 BPM
WBC # BLD AUTO: 8.65 THOUSAND/UL (ref 4.31–10.16)

## 2024-05-28 PROCEDURE — 80053 COMPREHEN METABOLIC PANEL: CPT | Performed by: EMERGENCY MEDICINE

## 2024-05-28 PROCEDURE — 99291 CRITICAL CARE FIRST HOUR: CPT | Performed by: EMERGENCY MEDICINE

## 2024-05-28 PROCEDURE — 84484 ASSAY OF TROPONIN QUANT: CPT | Performed by: EMERGENCY MEDICINE

## 2024-05-28 PROCEDURE — 74174 CTA ABD&PLVS W/CONTRAST: CPT

## 2024-05-28 PROCEDURE — 93010 ELECTROCARDIOGRAM REPORT: CPT | Performed by: INTERNAL MEDICINE

## 2024-05-28 PROCEDURE — 71275 CT ANGIOGRAPHY CHEST: CPT

## 2024-05-28 PROCEDURE — 99285 EMERGENCY DEPT VISIT HI MDM: CPT

## 2024-05-28 PROCEDURE — 93005 ELECTROCARDIOGRAM TRACING: CPT

## 2024-05-28 PROCEDURE — 36415 COLL VENOUS BLD VENIPUNCTURE: CPT | Performed by: EMERGENCY MEDICINE

## 2024-05-28 PROCEDURE — 85025 COMPLETE CBC W/AUTO DIFF WBC: CPT | Performed by: EMERGENCY MEDICINE

## 2024-05-28 PROCEDURE — 83690 ASSAY OF LIPASE: CPT | Performed by: EMERGENCY MEDICINE

## 2024-05-28 PROCEDURE — 71045 X-RAY EXAM CHEST 1 VIEW: CPT

## 2024-05-28 RX ADMIN — IOHEXOL 100 ML: 350 INJECTION, SOLUTION INTRAVENOUS at 23:45

## 2024-05-29 ENCOUNTER — HOSPITAL ENCOUNTER (INPATIENT)
Facility: HOSPITAL | Age: 69
LOS: 1 days | Discharge: HOME | DRG: 270 | End: 2024-05-30
Attending: STUDENT IN AN ORGANIZED HEALTH CARE EDUCATION/TRAINING PROGRAM | Admitting: STUDENT IN AN ORGANIZED HEALTH CARE EDUCATION/TRAINING PROGRAM
Payer: COMMERCIAL

## 2024-05-29 ENCOUNTER — ANESTHESIA EVENT (EMERGENCY)
Dept: RADIOLOGY | Facility: HOSPITAL | Age: 69
DRG: 270 | End: 2024-05-29
Payer: COMMERCIAL

## 2024-05-29 ENCOUNTER — APPOINTMENT (EMERGENCY)
Dept: RADIOLOGY | Facility: HOSPITAL | Age: 69
DRG: 270 | End: 2024-05-29
Payer: COMMERCIAL

## 2024-05-29 ENCOUNTER — ANESTHESIA (EMERGENCY)
Dept: RADIOLOGY | Facility: HOSPITAL | Age: 69
DRG: 270 | End: 2024-05-29
Payer: COMMERCIAL

## 2024-05-29 VITALS
TEMPERATURE: 97.8 F | OXYGEN SATURATION: 100 % | HEART RATE: 75 BPM | RESPIRATION RATE: 20 BRPM | SYSTOLIC BLOOD PRESSURE: 152 MMHG | HEIGHT: 69 IN | WEIGHT: 180 LBS | BODY MASS INDEX: 26.66 KG/M2 | DIASTOLIC BLOOD PRESSURE: 86 MMHG

## 2024-05-29 DIAGNOSIS — I72.8 PANCREATICODUODENAL ARTERY ANEURYSM (HCC): Primary | ICD-10-CM

## 2024-05-29 DIAGNOSIS — I72.8 PSEUDOANEURYSM OF INTRA-ABDOMINAL ARTERY (HCC): ICD-10-CM

## 2024-05-29 LAB
2HR DELTA HS TROPONIN: -5 NG/L
ABO GROUP BLD BPU: NORMAL
ABO GROUP BLD BPU: NORMAL
ABO GROUP BLD: NORMAL
ABO GROUP BLD: NORMAL
ALBUMIN SERPL BCP-MCNC: 3.6 G/DL (ref 3.5–5)
ALBUMIN SERPL BCP-MCNC: 3.9 G/DL (ref 3.5–5)
ALP SERPL-CCNC: 41 U/L (ref 34–104)
ALP SERPL-CCNC: 41 U/L (ref 34–104)
ALT SERPL W P-5'-P-CCNC: 15 U/L (ref 7–52)
ALT SERPL W P-5'-P-CCNC: 17 U/L (ref 7–52)
ANION GAP SERPL CALCULATED.3IONS-SCNC: 6 MMOL/L (ref 4–13)
ANION GAP SERPL CALCULATED.3IONS-SCNC: 7 MMOL/L (ref 4–13)
APTT PPP: 23 SECONDS (ref 23–37)
APTT PPP: 24 SECONDS (ref 23–37)
AST SERPL W P-5'-P-CCNC: 14 U/L (ref 13–39)
AST SERPL W P-5'-P-CCNC: 15 U/L (ref 13–39)
BASOPHILS # BLD AUTO: 0.03 THOUSANDS/ÂΜL (ref 0–0.1)
BASOPHILS # BLD MANUAL: 0.2 THOUSAND/UL (ref 0–0.1)
BASOPHILS NFR BLD AUTO: 0 % (ref 0–1)
BASOPHILS NFR MAR MANUAL: 2 % (ref 0–1)
BILIRUB SERPL-MCNC: 0.41 MG/DL (ref 0.2–1)
BILIRUB SERPL-MCNC: 0.49 MG/DL (ref 0.2–1)
BLD GP AB SCN SERPL QL: NEGATIVE
BLD GP AB SCN SERPL QL: NEGATIVE
BPU ID: NORMAL
BPU ID: NORMAL
BUN SERPL-MCNC: 18 MG/DL (ref 5–25)
BUN SERPL-MCNC: 19 MG/DL (ref 5–25)
CALCIUM SERPL-MCNC: 7.7 MG/DL (ref 8.4–10.2)
CALCIUM SERPL-MCNC: 8 MG/DL (ref 8.4–10.2)
CARDIAC TROPONIN I PNL SERPL HS: 4 NG/L
CHLORIDE SERPL-SCNC: 105 MMOL/L (ref 96–108)
CHLORIDE SERPL-SCNC: 108 MMOL/L (ref 96–108)
CO2 SERPL-SCNC: 25 MMOL/L (ref 21–32)
CO2 SERPL-SCNC: 25 MMOL/L (ref 21–32)
CREAT SERPL-MCNC: 1.09 MG/DL (ref 0.6–1.3)
CREAT SERPL-MCNC: 1.14 MG/DL (ref 0.6–1.3)
CROSSMATCH: NORMAL
CROSSMATCH: NORMAL
DACRYOCYTES BLD QL SMEAR: PRESENT
EOSINOPHIL # BLD AUTO: 0.01 THOUSAND/ÂΜL (ref 0–0.61)
EOSINOPHIL # BLD MANUAL: 0 THOUSAND/UL (ref 0–0.4)
EOSINOPHIL NFR BLD AUTO: 0 % (ref 0–6)
EOSINOPHIL NFR BLD MANUAL: 0 % (ref 0–6)
ERYTHROCYTE [DISTWIDTH] IN BLOOD BY AUTOMATED COUNT: 12.1 % (ref 11.6–15.1)
ERYTHROCYTE [DISTWIDTH] IN BLOOD BY AUTOMATED COUNT: 12.7 % (ref 11.6–15.1)
GFR SERPL CREATININE-BSD FRML MDRD: 64 ML/MIN/1.73SQ M
GFR SERPL CREATININE-BSD FRML MDRD: 68 ML/MIN/1.73SQ M
GLUCOSE SERPL-MCNC: 144 MG/DL (ref 65–140)
GLUCOSE SERPL-MCNC: 149 MG/DL (ref 65–140)
GLUCOSE SERPL-MCNC: 165 MG/DL (ref 65–140)
HCT VFR BLD AUTO: 31.8 % (ref 36.5–49.3)
HCT VFR BLD AUTO: 32.2 % (ref 36.5–49.3)
HCT VFR BLD AUTO: 32.4 % (ref 36.5–49.3)
HGB BLD-MCNC: 10.4 G/DL (ref 12–17)
HGB BLD-MCNC: 10.5 G/DL (ref 12–17)
HGB BLD-MCNC: 10.7 G/DL (ref 12–17)
IMM GRANULOCYTES # BLD AUTO: 0.1 THOUSAND/UL (ref 0–0.2)
IMM GRANULOCYTES NFR BLD AUTO: 1 % (ref 0–2)
INR PPP: 1.03 (ref 0.84–1.19)
INR PPP: 1.1 (ref 0.84–1.19)
LACTATE SERPL-SCNC: 2.2 MMOL/L (ref 0.5–2)
LYMPHOCYTES # BLD AUTO: 0.1 THOUSAND/UL (ref 0.6–4.47)
LYMPHOCYTES # BLD AUTO: 0.78 THOUSANDS/ÂΜL (ref 0.6–4.47)
LYMPHOCYTES # BLD AUTO: 1 % (ref 14–44)
LYMPHOCYTES NFR BLD AUTO: 6 % (ref 14–44)
MACROCYTES BLD QL AUTO: PRESENT
MAGNESIUM SERPL-MCNC: 1.7 MG/DL (ref 1.9–2.7)
MCH RBC QN AUTO: 29.7 PG (ref 26.8–34.3)
MCH RBC QN AUTO: 30 PG (ref 26.8–34.3)
MCHC RBC AUTO-ENTMCNC: 33 G/DL (ref 31.4–37.4)
MCHC RBC AUTO-ENTMCNC: 33.2 G/DL (ref 31.4–37.4)
MCV RBC AUTO: 90 FL (ref 82–98)
MCV RBC AUTO: 90 FL (ref 82–98)
MONOCYTES # BLD AUTO: 0.1 THOUSAND/UL (ref 0–1.22)
MONOCYTES # BLD AUTO: 0.67 THOUSAND/ÂΜL (ref 0.17–1.22)
MONOCYTES NFR BLD AUTO: 5 % (ref 4–12)
MONOCYTES NFR BLD: 1 % (ref 4–12)
NEUTROPHILS # BLD AUTO: 11.72 THOUSANDS/ÂΜL (ref 1.85–7.62)
NEUTROPHILS # BLD MANUAL: 9.59 THOUSAND/UL (ref 1.85–7.62)
NEUTS BAND NFR BLD MANUAL: 3 % (ref 0–8)
NEUTS SEG NFR BLD AUTO: 88 % (ref 43–75)
NEUTS SEG NFR BLD AUTO: 93 % (ref 43–75)
NRBC BLD AUTO-RTO: 0 /100 WBCS
PHOSPHATE SERPL-MCNC: 3.2 MG/DL (ref 2.3–4.1)
PLATELET # BLD AUTO: 202 THOUSANDS/UL (ref 149–390)
PLATELET # BLD AUTO: 214 THOUSANDS/UL (ref 149–390)
PLATELET BLD QL SMEAR: ADEQUATE
PMV BLD AUTO: 9.2 FL (ref 8.9–12.7)
PMV BLD AUTO: 9.3 FL (ref 8.9–12.7)
POIKILOCYTOSIS BLD QL SMEAR: PRESENT
POLYCHROMASIA BLD QL SMEAR: PRESENT
POTASSIUM SERPL-SCNC: 4 MMOL/L (ref 3.5–5.3)
POTASSIUM SERPL-SCNC: 4.5 MMOL/L (ref 3.5–5.3)
PROT SERPL-MCNC: 5.4 G/DL (ref 6.4–8.4)
PROT SERPL-MCNC: 5.7 G/DL (ref 6.4–8.4)
PROTHROMBIN TIME: 13.9 SECONDS (ref 11.6–14.5)
PROTHROMBIN TIME: 14.1 SECONDS (ref 11.6–14.5)
RBC # BLD AUTO: 3.54 MILLION/UL (ref 3.88–5.62)
RBC # BLD AUTO: 3.57 MILLION/UL (ref 3.88–5.62)
RBC MORPH BLD: PRESENT
RH BLD: POSITIVE
RH BLD: POSITIVE
SODIUM SERPL-SCNC: 137 MMOL/L (ref 135–147)
SODIUM SERPL-SCNC: 139 MMOL/L (ref 135–147)
SPECIMEN EXPIRATION DATE: NORMAL
SPECIMEN EXPIRATION DATE: NORMAL
UNIT DISPENSE STATUS: NORMAL
UNIT DISPENSE STATUS: NORMAL
UNIT PRODUCT CODE: NORMAL
UNIT PRODUCT CODE: NORMAL
UNIT PRODUCT VOLUME: 350 ML
UNIT PRODUCT VOLUME: 350 ML
UNIT RH: NORMAL
UNIT RH: NORMAL
WBC # BLD AUTO: 13.31 THOUSAND/UL (ref 4.31–10.16)
WBC # BLD AUTO: 9.99 THOUSAND/UL (ref 4.31–10.16)

## 2024-05-29 PROCEDURE — 84484 ASSAY OF TROPONIN QUANT: CPT | Performed by: EMERGENCY MEDICINE

## 2024-05-29 PROCEDURE — 96365 THER/PROPH/DIAG IV INF INIT: CPT

## 2024-05-29 PROCEDURE — 86850 RBC ANTIBODY SCREEN: CPT

## 2024-05-29 PROCEDURE — 84100 ASSAY OF PHOSPHORUS: CPT

## 2024-05-29 PROCEDURE — 86901 BLOOD TYPING SEROLOGIC RH(D): CPT | Performed by: EMERGENCY MEDICINE

## 2024-05-29 PROCEDURE — 99232 SBSQ HOSP IP/OBS MODERATE 35: CPT | Performed by: SURGERY

## 2024-05-29 PROCEDURE — 86850 RBC ANTIBODY SCREEN: CPT | Performed by: EMERGENCY MEDICINE

## 2024-05-29 PROCEDURE — 85018 HEMOGLOBIN: CPT

## 2024-05-29 PROCEDURE — 86900 BLOOD TYPING SEROLOGIC ABO: CPT

## 2024-05-29 PROCEDURE — 96361 HYDRATE IV INFUSION ADD-ON: CPT

## 2024-05-29 PROCEDURE — 82803 BLOOD GASES ANY COMBINATION: CPT

## 2024-05-29 PROCEDURE — C1769 GUIDE WIRE: HCPCS

## 2024-05-29 PROCEDURE — 84132 ASSAY OF SERUM POTASSIUM: CPT

## 2024-05-29 PROCEDURE — 36415 COLL VENOUS BLD VENIPUNCTURE: CPT | Performed by: EMERGENCY MEDICINE

## 2024-05-29 PROCEDURE — 85025 COMPLETE CBC W/AUTO DIFF WBC: CPT

## 2024-05-29 PROCEDURE — 97166 OT EVAL MOD COMPLEX 45 MIN: CPT

## 2024-05-29 PROCEDURE — 82330 ASSAY OF CALCIUM: CPT

## 2024-05-29 PROCEDURE — 99232 SBSQ HOSP IP/OBS MODERATE 35: CPT | Performed by: PHYSICIAN ASSISTANT

## 2024-05-29 PROCEDURE — 86920 COMPATIBILITY TEST SPIN: CPT

## 2024-05-29 PROCEDURE — 30233N1 TRANSFUSION OF NONAUTOLOGOUS RED BLOOD CELLS INTO PERIPHERAL VEIN, PERCUTANEOUS APPROACH: ICD-10-PCS | Performed by: SURGERY

## 2024-05-29 PROCEDURE — 37244 VASC EMBOLIZE/OCCLUDE BLEED: CPT

## 2024-05-29 PROCEDURE — 99223 1ST HOSP IP/OBS HIGH 75: CPT | Performed by: SURGERY

## 2024-05-29 PROCEDURE — C1894 INTRO/SHEATH, NON-LASER: HCPCS

## 2024-05-29 PROCEDURE — P9016 RBC LEUKOCYTES REDUCED: HCPCS

## 2024-05-29 PROCEDURE — 84295 ASSAY OF SERUM SODIUM: CPT

## 2024-05-29 PROCEDURE — 85610 PROTHROMBIN TIME: CPT | Performed by: EMERGENCY MEDICINE

## 2024-05-29 PROCEDURE — 83735 ASSAY OF MAGNESIUM: CPT

## 2024-05-29 PROCEDURE — 97163 PT EVAL HIGH COMPLEX 45 MIN: CPT

## 2024-05-29 PROCEDURE — 37244 VASC EMBOLIZE/OCCLUDE BLEED: CPT | Performed by: RADIOLOGY

## 2024-05-29 PROCEDURE — 82947 ASSAY GLUCOSE BLOOD QUANT: CPT

## 2024-05-29 PROCEDURE — 85730 THROMBOPLASTIN TIME PARTIAL: CPT

## 2024-05-29 PROCEDURE — 76937 US GUIDE VASCULAR ACCESS: CPT

## 2024-05-29 PROCEDURE — 04L53DZ OCCLUSION OF SUPERIOR MESENTERIC ARTERY WITH INTRALUMINAL DEVICE, PERCUTANEOUS APPROACH: ICD-10-PCS | Performed by: RADIOLOGY

## 2024-05-29 PROCEDURE — NC001 PR NO CHARGE: Performed by: RADIOLOGY

## 2024-05-29 PROCEDURE — C1887 CATHETER, GUIDING: HCPCS

## 2024-05-29 PROCEDURE — 80053 COMPREHEN METABOLIC PANEL: CPT

## 2024-05-29 PROCEDURE — 85027 COMPLETE CBC AUTOMATED: CPT

## 2024-05-29 PROCEDURE — 82948 REAGENT STRIP/BLOOD GLUCOSE: CPT

## 2024-05-29 PROCEDURE — B414YZZ FLUOROSCOPY OF SUPERIOR MESENTERIC ARTERY USING OTHER CONTRAST: ICD-10-PCS | Performed by: RADIOLOGY

## 2024-05-29 PROCEDURE — 86901 BLOOD TYPING SEROLOGIC RH(D): CPT

## 2024-05-29 PROCEDURE — C1760 CLOSURE DEV, VASC: HCPCS

## 2024-05-29 PROCEDURE — 85730 THROMBOPLASTIN TIME PARTIAL: CPT | Performed by: EMERGENCY MEDICINE

## 2024-05-29 PROCEDURE — 85007 BL SMEAR W/DIFF WBC COUNT: CPT

## 2024-05-29 PROCEDURE — 99291 CRITICAL CARE FIRST HOUR: CPT | Performed by: STUDENT IN AN ORGANIZED HEALTH CARE EDUCATION/TRAINING PROGRAM

## 2024-05-29 PROCEDURE — 36246 INS CATH ABD/L-EXT ART 2ND: CPT | Performed by: RADIOLOGY

## 2024-05-29 PROCEDURE — 83605 ASSAY OF LACTIC ACID: CPT | Performed by: EMERGENCY MEDICINE

## 2024-05-29 PROCEDURE — 86900 BLOOD TYPING SEROLOGIC ABO: CPT | Performed by: EMERGENCY MEDICINE

## 2024-05-29 PROCEDURE — 36245 INS CATH ABD/L-EXT ART 1ST: CPT

## 2024-05-29 PROCEDURE — 85014 HEMATOCRIT: CPT

## 2024-05-29 PROCEDURE — 76937 US GUIDE VASCULAR ACCESS: CPT | Performed by: RADIOLOGY

## 2024-05-29 PROCEDURE — 85610 PROTHROMBIN TIME: CPT

## 2024-05-29 PROCEDURE — 99285 EMERGENCY DEPT VISIT HI MDM: CPT

## 2024-05-29 RX ORDER — MAGNESIUM SULFATE HEPTAHYDRATE 40 MG/ML
4 INJECTION, SOLUTION INTRAVENOUS ONCE
Status: COMPLETED | OUTPATIENT
Start: 2024-05-29 | End: 2024-05-29

## 2024-05-29 RX ORDER — LIDOCAINE HYDROCHLORIDE 20 MG/ML
INJECTION, SOLUTION EPIDURAL; INFILTRATION; INTRACAUDAL; PERINEURAL AS NEEDED
Status: DISCONTINUED | OUTPATIENT
Start: 2024-05-29 | End: 2024-05-29

## 2024-05-29 RX ORDER — PROPOFOL 10 MG/ML
INJECTION, EMULSION INTRAVENOUS AS NEEDED
Status: DISCONTINUED | OUTPATIENT
Start: 2024-05-29 | End: 2024-05-29

## 2024-05-29 RX ORDER — ATORVASTATIN CALCIUM 40 MG/1
40 TABLET, FILM COATED ORAL
Status: DISCONTINUED | OUTPATIENT
Start: 2024-05-29 | End: 2024-05-30 | Stop reason: HOSPADM

## 2024-05-29 RX ORDER — ONDANSETRON 2 MG/ML
4 INJECTION INTRAMUSCULAR; INTRAVENOUS EVERY 4 HOURS PRN
Status: DISCONTINUED | OUTPATIENT
Start: 2024-05-29 | End: 2024-05-30 | Stop reason: HOSPADM

## 2024-05-29 RX ORDER — HYDROMORPHONE HCL IN WATER/PF 6 MG/30 ML
0.2 PATIENT CONTROLLED ANALGESIA SYRINGE INTRAVENOUS EVERY 2 HOUR PRN
Status: DISCONTINUED | OUTPATIENT
Start: 2024-05-29 | End: 2024-05-30 | Stop reason: HOSPADM

## 2024-05-29 RX ORDER — AMOXICILLIN 250 MG
1 CAPSULE ORAL
Status: DISCONTINUED | OUTPATIENT
Start: 2024-05-29 | End: 2024-05-30 | Stop reason: HOSPADM

## 2024-05-29 RX ORDER — ASPIRIN 81 MG/1
81 TABLET, CHEWABLE ORAL DAILY
Status: DISCONTINUED | OUTPATIENT
Start: 2024-05-29 | End: 2024-05-30 | Stop reason: HOSPADM

## 2024-05-29 RX ORDER — SODIUM CHLORIDE 9 MG/ML
INJECTION, SOLUTION INTRAVENOUS CONTINUOUS PRN
Status: DISCONTINUED | OUTPATIENT
Start: 2024-05-29 | End: 2024-05-29

## 2024-05-29 RX ORDER — EPHEDRINE SULFATE 50 MG/ML
INJECTION INTRAVENOUS AS NEEDED
Status: DISCONTINUED | OUTPATIENT
Start: 2024-05-29 | End: 2024-05-29

## 2024-05-29 RX ORDER — OXYCODONE HYDROCHLORIDE 10 MG/1
10 TABLET ORAL ONCE
Status: COMPLETED | OUTPATIENT
Start: 2024-05-29 | End: 2024-05-29

## 2024-05-29 RX ORDER — ACETAMINOPHEN 325 MG/1
975 TABLET ORAL EVERY 8 HOURS SCHEDULED
Status: DISCONTINUED | OUTPATIENT
Start: 2024-05-29 | End: 2024-05-30 | Stop reason: HOSPADM

## 2024-05-29 RX ORDER — HYDROMORPHONE HCL IN WATER/PF 6 MG/30 ML
0.2 PATIENT CONTROLLED ANALGESIA SYRINGE INTRAVENOUS
Status: DISCONTINUED | OUTPATIENT
Start: 2024-05-29 | End: 2024-05-29

## 2024-05-29 RX ORDER — FENTANYL CITRATE 50 UG/ML
INJECTION, SOLUTION INTRAMUSCULAR; INTRAVENOUS AS NEEDED
Status: DISCONTINUED | OUTPATIENT
Start: 2024-05-29 | End: 2024-05-29

## 2024-05-29 RX ORDER — ONDANSETRON 2 MG/ML
4 INJECTION INTRAMUSCULAR; INTRAVENOUS ONCE AS NEEDED
Status: DISCONTINUED | OUTPATIENT
Start: 2024-05-29 | End: 2024-05-29

## 2024-05-29 RX ORDER — FLECAINIDE ACETATE 50 MG/1
50 TABLET ORAL EVERY MORNING
Status: DISCONTINUED | OUTPATIENT
Start: 2024-05-29 | End: 2024-05-30 | Stop reason: HOSPADM

## 2024-05-29 RX ORDER — OXYCODONE HYDROCHLORIDE 5 MG/1
5 TABLET ORAL EVERY 4 HOURS PRN
Status: DISCONTINUED | OUTPATIENT
Start: 2024-05-29 | End: 2024-05-30 | Stop reason: HOSPADM

## 2024-05-29 RX ORDER — FENTANYL CITRATE/PF 50 MCG/ML
25 SYRINGE (ML) INJECTION
Status: DISCONTINUED | OUTPATIENT
Start: 2024-05-29 | End: 2024-05-29

## 2024-05-29 RX ORDER — DEXAMETHASONE SODIUM PHOSPHATE 10 MG/ML
INJECTION, SOLUTION INTRAMUSCULAR; INTRAVENOUS AS NEEDED
Status: DISCONTINUED | OUTPATIENT
Start: 2024-05-29 | End: 2024-05-29

## 2024-05-29 RX ORDER — IODIXANOL 320 MG/ML
200 INJECTION, SOLUTION INTRAVASCULAR
Status: COMPLETED | OUTPATIENT
Start: 2024-05-29 | End: 2024-05-29

## 2024-05-29 RX ORDER — CHLORHEXIDINE GLUCONATE ORAL RINSE 1.2 MG/ML
15 SOLUTION DENTAL EVERY 12 HOURS SCHEDULED
Status: DISCONTINUED | OUTPATIENT
Start: 2024-05-29 | End: 2024-05-30 | Stop reason: HOSPADM

## 2024-05-29 RX ORDER — LIDOCAINE WITH 8.4% SOD BICARB 0.9%(10ML)
SYRINGE (ML) INJECTION AS NEEDED
Status: COMPLETED | OUTPATIENT
Start: 2024-05-29 | End: 2024-05-29

## 2024-05-29 RX ORDER — ONDANSETRON 2 MG/ML
INJECTION INTRAMUSCULAR; INTRAVENOUS AS NEEDED
Status: DISCONTINUED | OUTPATIENT
Start: 2024-05-29 | End: 2024-05-29

## 2024-05-29 RX ORDER — PANTOPRAZOLE SODIUM 20 MG/1
20 TABLET, DELAYED RELEASE ORAL
Status: DISCONTINUED | OUTPATIENT
Start: 2024-05-29 | End: 2024-05-30 | Stop reason: HOSPADM

## 2024-05-29 RX ADMIN — ATORVASTATIN CALCIUM 40 MG: 40 TABLET, FILM COATED ORAL at 16:56

## 2024-05-29 RX ADMIN — FENTANYL CITRATE 25 MCG: 50 INJECTION INTRAMUSCULAR; INTRAVENOUS at 03:20

## 2024-05-29 RX ADMIN — Medication 10 ML: at 03:13

## 2024-05-29 RX ADMIN — OXYCODONE HYDROCHLORIDE 10 MG: 10 TABLET ORAL at 21:47

## 2024-05-29 RX ADMIN — PROPOFOL 150 MG: 10 INJECTION, EMULSION INTRAVENOUS at 02:58

## 2024-05-29 RX ADMIN — ACETAMINOPHEN 975 MG: 325 TABLET, FILM COATED ORAL at 21:01

## 2024-05-29 RX ADMIN — LIDOCAINE HYDROCHLORIDE 100 MG: 20 INJECTION, SOLUTION EPIDURAL; INFILTRATION; INTRACAUDAL at 02:58

## 2024-05-29 RX ADMIN — FENTANYL CITRATE 50 MCG: 50 INJECTION INTRAMUSCULAR; INTRAVENOUS at 02:58

## 2024-05-29 RX ADMIN — EPHEDRINE SULFATE 10 MG: 50 INJECTION, SOLUTION INTRAVENOUS at 03:14

## 2024-05-29 RX ADMIN — SODIUM CHLORIDE 1000 ML: 0.9 INJECTION, SOLUTION INTRAVENOUS at 00:15

## 2024-05-29 RX ADMIN — DEXAMETHASONE SODIUM PHOSPHATE 10 MG: 10 INJECTION, SOLUTION INTRAMUSCULAR; INTRAVENOUS at 04:04

## 2024-05-29 RX ADMIN — OXYCODONE HYDROCHLORIDE 5 MG: 5 TABLET ORAL at 12:55

## 2024-05-29 RX ADMIN — FLECAINIDE ACETATE 50 MG: 50 TABLET ORAL at 11:13

## 2024-05-29 RX ADMIN — PHENYLEPHRINE HYDROCHLORIDE 30 MCG/MIN: 10 INJECTION INTRAVENOUS at 03:11

## 2024-05-29 RX ADMIN — CHLORHEXIDINE GLUCONATE 15 ML: 1.2 SOLUTION ORAL at 08:07

## 2024-05-29 RX ADMIN — FENTANYL CITRATE 25 MCG: 50 INJECTION INTRAMUSCULAR; INTRAVENOUS at 03:09

## 2024-05-29 RX ADMIN — PROPOFOL 40 MG: 10 INJECTION, EMULSION INTRAVENOUS at 03:20

## 2024-05-29 RX ADMIN — OXYCODONE HYDROCHLORIDE 5 MG: 5 TABLET ORAL at 16:56

## 2024-05-29 RX ADMIN — ONDANSETRON 4 MG: 2 INJECTION INTRAMUSCULAR; INTRAVENOUS at 04:05

## 2024-05-29 RX ADMIN — SODIUM CHLORIDE: 0.9 INJECTION, SOLUTION INTRAVENOUS at 02:45

## 2024-05-29 RX ADMIN — PANTOPRAZOLE SODIUM 20 MG: 20 TABLET, DELAYED RELEASE ORAL at 10:56

## 2024-05-29 RX ADMIN — DESMOPRESSIN ACETATE 32.8 MCG: 40 INJECTION, SOLUTION INTRAVENOUS; SUBCUTANEOUS at 01:34

## 2024-05-29 RX ADMIN — IODIXANOL 68 ML: 320 INJECTION, SOLUTION INTRAVASCULAR at 04:17

## 2024-05-29 RX ADMIN — CHLORHEXIDINE GLUCONATE 15 ML: 1.2 SOLUTION ORAL at 21:01

## 2024-05-29 RX ADMIN — ACETAMINOPHEN 975 MG: 325 TABLET, FILM COATED ORAL at 05:57

## 2024-05-29 RX ADMIN — SODIUM CHLORIDE: 9 INJECTION, SOLUTION INTRAVENOUS at 03:10

## 2024-05-29 RX ADMIN — MAGNESIUM SULFATE HEPTAHYDRATE 4 G: 40 INJECTION, SOLUTION INTRAVENOUS at 07:41

## 2024-05-29 RX ADMIN — SENNOSIDES AND DOCUSATE SODIUM 1 TABLET: 50; 8.6 TABLET ORAL at 21:03

## 2024-05-29 RX ADMIN — ASPIRIN 81 MG CHEWABLE TABLET 81 MG: 81 TABLET CHEWABLE at 10:56

## 2024-05-29 NOTE — QUICK NOTE
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 Patient hemodynamically stable currently. ED ordered pRBCs x 2, not yet administered. Discussed with vascular fellow Dr Davenport, who recommended patient be priority1 transferred under surgical critical care with IR consult for embolization. Dr Davenport reaching out to IR directly. Dr Saenz in ED aware and communicating with transfer team/Dr Jefferson.

## 2024-05-29 NOTE — ANESTHESIA POSTPROCEDURE EVALUATION
Post-Op Assessment Note    CV Status:  Stable  Pain Score: 0    Pain management: adequate       Mental Status:  Alert and awake   Hydration Status:  Euvolemic   PONV Controlled:  Controlled   Airway Patency:  Patent     Post Op Vitals Reviewed: Yes    No anethesia notable event occurred.    Staff: CRNA               /74 (05/29/24 0435)    Temp      Pulse 68 (05/29/24 0435)   Resp 15 (05/29/24 0435)    SpO2 97 % (05/29/24 0435)

## 2024-05-29 NOTE — H&P
[facility]  H&P: Critical Care  Name: Alex Arthur 68 y.o. 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  Plan: Taken to IR for procedural intervention  Q6 H&H  Transfuse for hemoglobin less than 7  Serial abdominal exams, consider CT abdomen for any acute change in exam/vital signs/labs  Diagnosis: Irregular heartbeat, PAC  Plan: Restart home flecainide 50 mg daily when appropriate, home aspirin  Diagnosis: Hypercholesterolemia  Plan: Continue rosuvastatin 10 mg daily    Pulm:  Diagnosis: No active issues  Plan: Goal SpO2 greater than 90%    GI:   Diagnosis: History of GERD  Plan: Restart home Prilosec ordered PPI prophylaxis when appropriate    :   Diagnosis: No active issues  Plan: Trend BUN/creatinine, monitor I's and O's    F/E/N:    F: Fluid resuscitation as needed  E: Replete as needed  N: N.p.o.    Heme/Onc:   Diagnosis: Pancreaticoduodenal aneurysm/pseudoaneurysm  Plan: Transfuse for hemoglobin less than 7, trend H&H    Endo:   Diagnosis: No active issues  Plan: Goal blood glucose between 1 40-1 80    ID:   Diagnosis: No active issues  Plan: Monitor fever curve/white blood cell count    MSK/Skin:   Diagnosis: No active issues  Plan: Frequent turns, pressure ulcer prophylaxis  PT/OT, OOB when able  Monitoring groin access site, local wound care    Disposition: Critical care       History of Present Illness     HPI: Alex Arthur is a 68 y.o. 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  Patient initially presented to [facility] due to concerns  of epigastric and mid abdominal pain radiating into his chest that began when he sat up from a sitting position and felt diaphoretic and nauseous as well as lightheaded.  Pain resolved moderately at that time and since relocated to his left lower abdomen and still accompanied by lightheadedness.  Patient currently endorsing diffuse moderate abdominal pain that he rates a 6 out of 10 with a focus in the left lower and left upper quadrants.  Patient also reports bloating sensation but states lightheadedness has mostly resolved.    History obtained from chart review and the patient.  Review of Systems   Historical Information   Past Medical History:  No date: Colon polyp  No date: GERD (gastroesophageal reflux disease)  No date: Irregular heart beat      Comment:  PAC Past Surgical History:  No date: CHOLECYSTECTOMY  No date: COLONOSCOPY  No date: EGD  No date: EGD AND COLONOSCOPY  No date: KNEE ARTHROSCOPY; Bilateral  No date: KNEE SURGERY; Left      Comment:  benign tumor removed   10/9/2020: [place] LAPAROSCOPY SURG CHOLECYSTECTOMY; N/A      Comment:  Procedure: LAPAROSCOPIC CHOLECYSTECTOMY, umbilical                hernia repair;  Surgeon: Yo Leyva MD;  Location: [facility];  Service: General  No date: PROSTATE SURGERY      Comment:  2007   Current Outpatient Medications   Medication Instructions    aspirin 81 mg chewable tablet Oral    Dialyvite Vitamin D 5000 5,000 Units, Oral, Daily    flecainide (TAMBOCOR) 50 mg, Oral, Daily    omeprazole (PRILOSEC) 20 mg, Oral, Daily    oxazepam (SERAX) 20 mg, Oral, Daily at bedtime PRN    rosuvastatin (CRESTOR) 10 mg, Oral, Daily    sildenafil (VIAGRA) 100 mg, Oral, Daily PRN    tobramycin-dexamethasone (TOBRADEX) ophthalmic suspension 1 drop, Left Eye, Every 4 hours while awake    No Known Allergies   Social History     Tobacco Use    Smoking status: Never    Smokeless tobacco: Never   Vaping Use    Vaping status: Never Used   Substance Use Topics    Alcohol  use: Yes     Alcohol/week: 1.0 standard drink of alcohol     Types: 1 Glasses of wine per week    Drug use: Never    Family History   Problem Relation Age of Onset    Lymphoma Mother     Lung cancer Father     Heart disease Brother     Other Brother         multisystem atrophy    Colon cancer Maternal Grandmother           Objective                            Vitals I/O      Most Recent Min/Max in 24hrs   Temp 98.1 °F (36.7 °C) Temp  Min: 97.8 °F (36.6 °C)  Max: 98.1 °F (36.7 °C)   Pulse 79 Pulse  Min: 64  Max: 79   Resp 20 Resp  Min: 16  Max: 20   /81 BP  Min: 105/76  Max: 155/76   O2 Sat 97 % SpO2  Min: 97 %  Max: 100 %      Intake/Output Summary (Last 24 hours) at 5/29/2024 0323  Last data filed at 5/29/2024 0250  Gross per 24 hour   Intake --   Output 250 ml   Net -250 ml       No diet orders on file    Invasive Monitoring   Arterial Line  Fatoumata BP    No data recorded   MAP    No data recorded           Physical Exam   Physical Exam  Vitals and nursing note reviewed.   Eyes:      Extraocular Movements: Extraocular movements intact.      Conjunctiva/sclera: Conjunctivae normal.      Pupils: Pupils are equal, round, and reactive to light.   Skin:     General: Skin is warm.   HENT:      Head: Normocephalic and atraumatic.      Right Ear: Hearing normal.      Left Ear: Hearing normal.      Mouth/Throat:      Mouth: Mucous membranes are moist.   Cardiovascular:      Rate and Rhythm: Normal rate and regular rhythm.      Pulses: Normal pulses.   Musculoskeletal:         General: Normal range of motion.   Abdominal:      Palpations: Abdomen is soft.      Tenderness: There is abdominal tenderness. There is no guarding.      Comments: Patient has diffuse abdominal tenderness palpation but focus in left upper and left lower quadrants.  No rebound no guarding.  Patient endorses is mild distention.  Normoactive bowel sounds.   Constitutional:       General: He is not in acute distress.     Appearance: He is  well-developed and well-nourished. He is not ill-appearing.   Pulmonary:      Effort: Pulmonary effort is normal.      Breath sounds: Normal breath sounds.   Neurological:      General: No focal deficit present.      Mental Status: He is alert.      Motor: Strength full and intact in all extremities.            Diagnostic Studies      EKG: Normal sinus with incomplete RBBB  Imaging:  I have personally reviewed pertinent reports.       Medications:  Scheduled PRN      lidocaine 1% buffered, , PRN       Continuous            Labs:    CBC    Recent Labs     05/28/24 2250 05/29/24  0242   WBC 8.65 13.31*   HGB 11.5* 10.5*   HCT 36.0* 31.8*    214     BMP    Recent Labs     05/28/24 2250 05/29/24  0242   SODIUM 140 137   K 3.1* 4.0    105   CO2 29 25   AGAP 7 7   BUN 19 18   CREATININE 1.25 1.09   CALCIUM 8.9 8.0*       Coags    Recent Labs     05/29/24  0015   INR 1.03   PTT 23        Additional Electrolytes  No recent results       Blood Gas    No recent results  No recent results LFTs  Recent Labs     05/28/24 2250 05/29/24  0242   ALT 19 17   AST 17 15   ALKPHOS 46 41   ALB 4.2 3.9   TBILI 0.35 0.41       Infectious  No recent results  Glucose  Recent Labs     05/28/24 2250 05/29/24  0242   GLUC 146* 165*               Zeb Martinez MD

## 2024-05-29 NOTE — EMTALA/ACUTE CARE TRANSFER
[facility] EMERGENCY DEPARTMENT  [address]  [place] [address]  Dept: [phone]      EMTALA TRANSFER CONSENT    NAME Alex Arthur                                         1955                              MRN [id number]    I have been informed of my rights regarding examination, treatment, and transfer   by Dr. Ashley Saenz DO    Benefits: Specialized equipment and/or services available at the receiving facility (Include comment)________________________ (Vascular Surgery , Surgical ICU)    Risks: Potential for delay in receiving treatment, Potential deterioration of medical condition, Loss of IV, Increased discomfort during transfer      Consent for Transfer:  I acknowledge that my medical condition has been evaluated and explained to me by the emergency department physician or other qualified medical person and/or my attending physician, who has recommended that I be transferred to the service of  Accepting Physician: Dr. Jefferson at Accepting Facility Name, City & State : [facility] [place]. The above potential benefits of such transfer, the potential risks associated with such transfer, and the probable risks of not being transferred have been explained to me, and I fully understand them.  The doctor has explained that, in my case, the benefits of transfer outweigh the risks.  I agree to be transferred.    I authorize the performance of emergency medical procedures and treatments upon me in both transit and upon arrival at the receiving facility.  Additionally, I authorize the release of any and all medical records to the receiving facility and request they be transported with me, if possible.  I understand that the safest mode of transportation during a medical emergency is an ambulance and that the Hospital advocates the use of this mode of transport. Risks of traveling to the receiving facility by car, including absence of medical control, life sustaining equipment, such as  oxygen, and medical personnel has been explained to me and I fully understand them.    (TUCKER CORRECT BOX BELOW)  [  ]  I consent to the stated transfer and to be transported by ambulance/helicopter.  [  ]  I consent to the stated transfer, but refuse transportation by ambulance and accept full responsibility for my transportation by car.  I understand the risks of non-ambulance transfers and I exonerate the Hospital and its staff from any deterioration in my condition that results from this refusal.    X___________________________________________    DATE  24  TIME________  Signature of patient or legally responsible individual signing on patient behalf           RELATIONSHIP TO PATIENT_________________________          Provider Certification    NAME Alex Arthur                                         1955                              MRN [id number]    A medical screening exam was performed on the above named patient.  Based on the examination:    Condition Necessitating Transfer The primary encounter diagnosis was Abdominal pain. 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    Patient Condition: The patient has been stabilized such that within reasonable medical probability, no material deterioration of the patient condition or the condition of the unborn child(shamir) is likely to result from the transfer    Reason for Transfer: Level of Care needed not available at this facility    Transfer Requirements: Facility [facility]   Space available and qualified personnel available for treatment as acknowledged by    Agreed to accept transfer and to provide appropriate medical treatment as acknowledged by       Dr. Jefferson  Appropriate medical records of the examination and treatment of the patient are provided at the time of transfer   STAFF INITIAL WHEN COMPLETED _______  Transfer will be performed by qualified personnel from    and  appropriate transfer equipment as required, including the use of necessary and appropriate life support measures.    Provider Certification: I have examined the patient and explained the following risks and benefits of being transferred/refusing transfer to the patient/family:  General risk, such as traffic hazards, adverse weather conditions, rough terrain or turbulence, possible failure of equipment (including vehicle or aircraft), or consequences of actions of persons outside the control of the transport personnel, Unanticipated needs of medical equipment and personnel during transport, Risk of worsening condition, The possibility of a transport vehicle being unavailable      Based on these reasonable risks and benefits to the patient and/or the unborn child(shamir), and based upon the information available at the time of the patient’s examination, I certify that the medical benefits reasonably to be expected from the provision of appropriate medical treatments at another medical facility outweigh the increasing risks, if any, to the individual’s medical condition, and in the case of labor to the unborn child, from effecting the transfer.    X____________________________________________ DATE 05/29/24        TIME_______      ORIGINAL - SEND TO MEDICAL RECORDS   COPY - SEND WITH PATIENT DURING TRANSFER

## 2024-05-29 NOTE — QUICK NOTE
Postop check    Subjective: Patient seen assessed bedside.  Currently resting comfortably.  No acute complaints at this time.  Pain well-controlled.    Objective:  Physical Exam  Vitals and nursing note reviewed.   Eyes:      Extraocular Movements: Extraocular movements intact.      Conjunctiva/sclera: Conjunctivae normal.      Pupils: Pupils are equal, round, and reactive to light.   Skin:     General: Skin is warm.      Capillary Refill: Capillary refill takes less than 2 seconds.   HENT:      Head: Normocephalic and atraumatic.      Right Ear: Hearing normal.      Left Ear: Hearing normal.      Mouth/Throat:      Mouth: Mucous membranes are moist.   Cardiovascular:      Rate and Rhythm: Normal rate and regular rhythm.      Pulses: Normal pulses.      Comments: Patient right groin access site dressing C/D/I.  Site is soft, no palpable hematoma, no pulsatile masses.  Musculoskeletal:         General: Normal range of motion.   Abdominal: General: There is no distension.      Palpations: Abdomen is soft.      Tenderness: There is no abdominal tenderness. There is no guarding.      Comments: Patient states abdominal pain currently well-controlled.   Constitutional:       Appearance: He is well-developed and well-nourished.   Pulmonary:      Effort: Pulmonary effort is normal.      Breath sounds: Normal breath sounds.   Neurological:      General: No focal deficit present.      Mental Status: He is alert and oriented to person, place and time.      Motor: Strength full and intact in all extremities.       Visit Vitals  /74   Pulse 68   Temp 98.1 °F (36.7 °C)   Resp 15   Wt 85.9 kg (189 lb 6 oz)   SpO2 97%   BMI 27.97 kg/m²   Smoking Status Never   BSA 2.02 m²     Results Reviewed       Procedure Component Value Units Date/Time    Comprehensive metabolic panel [[id number]]  (Abnormal) Collected: 05/29/24 0242    Lab Status: Final result Specimen: Blood from Arm, Left Updated: 05/29/24 0312     Sodium 137 mmol/L       Potassium 4.0 mmol/L      Chloride 105 mmol/L      CO2 25 mmol/L      ANION GAP 7 mmol/L      BUN 18 mg/dL      Creatinine 1.09 mg/dL      Glucose 165 mg/dL      Calcium 8.0 mg/dL      AST 15 U/L      ALT 17 U/L      Alkaline Phosphatase 41 U/L      Total Protein 5.7 g/dL      Albumin 3.9 g/dL      Total Bilirubin 0.41 mg/dL      eGFR 68 ml/min/1.73sq m     Narrative:      National Kidney Disease Foundation guidelines for Chronic Kidney Disease (CKD):     Stage 1 with normal or high GFR (GFR > 90 mL/min/1.73 square meters)    Stage 2 Mild CKD (GFR = 60-89 mL/min/1.73 square meters)    Stage 3A Moderate CKD (GFR = 45-59 mL/min/1.73 square meters)    Stage 3B Moderate CKD (GFR = 30-44 mL/min/1.73 square meters)    Stage 4 Severe CKD (GFR = 15-29 mL/min/1.73 square meters)    Stage 5 End Stage CKD (GFR <15 mL/min/1.73 square meters)  Note: GFR calculation is accurate only with a steady state creatinine    CBC and differential [[id number]]  (Abnormal) Collected: 05/29/24 0242    Lab Status: Final result Specimen: Blood from Arm, Left Updated: 05/29/24 0251     WBC 13.31 Thousand/uL      RBC 3.54 Million/uL      Hemoglobin 10.5 g/dL      Hematocrit 31.8 %      MCV 90 fL      MCH 29.7 pg      MCHC 33.0 g/dL      RDW 12.1 %      MPV 9.2 fL      Platelets 214 Thousands/uL      nRBC 0 /100 WBCs      Segmented % 88 %      Immature Grans % 1 %      Lymphocytes % 6 %      Monocytes % 5 %      Eosinophils Relative 0 %      Basophils Relative 0 %      Absolute Neutrophils 11.72 Thousands/µL      Absolute Immature Grans 0.10 Thousand/uL      Absolute Lymphocytes 0.78 Thousands/µL      Absolute Monocytes 0.67 Thousand/µL      Eosinophils Absolute 0.01 Thousand/µL      Basophils Absolute 0.03 Thousands/µL               Assessment: Patient is a 68-year-old male initially 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    Plan:  Postop labs  Every 6 hemoglobins  Multimodal analgesia  Neurovascular  checks  Serial abdominal exams

## 2024-05-29 NOTE — Clinical Note
Pt is 68 y.o. 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  . Pt is  S/p coil embolization with IR. PT consulted for assist w/ mobility and d/c planning recommendations.  Pt   has a past medical history of Colon polyp, GERD (gastroesophageal reflux disease), and Irregular heart beat. .  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 [place] snot use AD for mobility and is indep w/ ADL and all IADLs.  Due to post op management and monitoring in MICU;  pain,  decreased activity tolerance compared to baseline, increased assistance needed from caregiver at current time, continuous monitoring, trending labs;  multiple lines,  note potentially  unstable clinical picture (high complexity). Currently,  pt  is requiring S A for bed skills; S for functional transfers and S for ambulation w/o AD.   Pt presents  currently w/ overall mobility deficits 2* to: pain; lightheadedness;   multiple lines; (Please find additional objective findings from PT assessment regarding body systems outlined above.) Pt is functioning at S levels of mobility/ ambulation w/o AD; anticipate quick recovery to baseline. Further skilled PT interventions not indicated at present as pt functioning at S level. Recommend ongoing ambulation/ activity w/ nursing vs restorative staff as needed until time of d/c. No further PT needs at present. PT signing off.

## 2024-05-29 NOTE — ANESTHESIA PREPROCEDURE EVALUATION
Procedure:  IR MESENTERIC/VISCERAL ANGIOGRAM    Relevant Problems   CARDIO   (+) PAC (premature atrial contraction)   (+) Pure hypercholesterolemia      /RENAL   (+) Malignant neoplasm of prostate (HCC)      MUSCULOSKELETAL   (+) Primary osteoarthritis of right knee   (+) Pyriformis syndrome, unspecified laterality        Physical Exam    Airway    Mallampati score: II  TM Distance: >3 FB  Neck ROM: full     Dental   No notable dental hx     Cardiovascular  Rhythm: regular, Rate: normal    Pulmonary  Pulmonary exam normal     Other Findings        Anesthesia Plan  ASA Score- 3 Emergent    Anesthesia Type- general with ASA Monitors.         Additional Monitors: arterial line.    Airway Plan: LMA.           Plan Factors-Exercise tolerance (METS): >4 METS.    Chart reviewed. EKG reviewed. Imaging results reviewed. Existing labs reviewed.     Patient is not a current smoker.              Induction- intravenous.    Postoperative Plan- Plan for postoperative opioid use. Planned trial extubation    Perioperative Resuscitation Plan - Level 1 - Full Code.       Informed Consent- Anesthetic plan and risks discussed with patient.  I personally reviewed this patient with the CRNA. Discussed and agreed on the Anesthesia Plan with the CRNA..      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  Pt says likely related to abdominal distension  Active at home, uses elliptical daily    ECG 12 lead  Order: [id number]   Status: Final result       Visible to patient: Yes (seen)       Next appt: 10/07/2024 at 10:00 AM in Orthopedic Surgery (Taj Ivy MD)    0 Result Notes        Component  Ref Range & Units 5/28/24 2241 3/2/21 1029 9/30/20 1128   Ventricular Rate  BPM 71  63   Atrial Rate  BPM 71  63   HI Interval  ms 186 00:11:01 R 174   QRSD Interval  ms 92  94   QT Interval  ms 416  410   QTC  Interval  ms 452  419   P Axis  degrees 13  43   QRS Axis  degrees 31  41   T Wave Axis  degrees -7  4   MAX. SYSTOLIC BP  194 R    ECG Interp during Ex      Ex Summary Comment      Chest Pain Statement  none    Overall Hr Response To Exercise      Overall BP Response To Exercise      Max Diastolic Bp  86 R    Max Heart Rate  171 R    Max Predicted Heart Rate  153 R    Reason for Termination  Protocol Complete    Test Indication  Abnormal ECG    Target Hr Formular  (220 - Age)*100%    Arrhy During Ex      ECG Interp Before Ex                 Narrative & Impression    Normal sinus rhythm  Incomplete right bundle branch block  ST & T wave abnormality, consider anterior ischemia  Abnormal ECG  When compared with ECG of 30-SEP-2020 11:28,  Inverted T waves have replaced nonspecific T wave abnormality in Anterior leads  Normal sinus rhythm  Incomplete right bundle branch block  ST & T wave abnormality, consider anterior ischemia  Abnormal ECG  When compared with ECG of 30-SEP-2020 11:28,  Inverted T waves have replaced nonspecific T wave abnormality in Anterior leads  Confirmed by Ronald Payne ([id number]) on 5/28/2024 10:55:39 PM      Spe           Stress test 2023      Stress ECG: A Adalberto protocol stress test was performed. The patient reached stage 4.0 of the protocol after exercising for 11 min and 0 sec and had a maximal HR of 166 bpm (110 % of MPHR) and 13.4 METS. The patient experienced no angina during the test. Blood pressure demonstrated a normal response and heart rate demonstrated a normal response to stress.    Stress ECG: <1 mm horizontal ST depression in V4 at peak exercise with rapid resolution in recovery, not meeting criteria for ischemia. No diagnostic exercise stress ECG evidence for ischemia.

## 2024-05-29 NOTE — CONSULTS
Consultation - Vascular Surgery   Alex Arthur 68 y.o. 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  Currently with a contained bleed without evidence of extremis.    Plan:  Plan for coil embolization of PDA branch with IR   Serial abdominal exams post embolization   Trend H/H, transfuse prn for hgb < 7 (baseline 14)   Goal MAP > 65   Pain  control as needed  NPO   VTE ppx   Surgical critical care input appreciated     History of Present Illness   Physician Requesting Consult: No att. providers found  Reason for Consult / Principal Problem: PDA aneurysm with bleed     HPI: Alex Arthur is a 68 y.o. year old male who presents with sudden onset abdominal pain that started approximately 10 PM prior to arrival.  Pain initially started lower to mid abdomen with radiation to the chest.  This was then followed with diaphoresis, nausea, lightheadedness, and weakness when attempting to stand at his desk.  Pain noted to be about a 6 out of 10 and became more diffuse prompting evaluation at ED at [facility].  Has never had similar symptoms in the past.  Currently denies any other complaints including fever/chills, vision changes, shortness of breath, vomiting, diarrhea.    Consults    Review of Systems   Constitutional: Negative.    HENT: Negative.     Respiratory: Negative.     Cardiovascular: Negative.    Gastrointestinal:  Positive for abdominal pain.   Endocrine: Negative.    Genitourinary: Negative.    Musculoskeletal: Negative.    Skin: Negative.    Neurological: Negative.    Psychiatric/Behavioral: Negative.         Historical Information   Past Medical History:   Diagnosis Date    Colon polyp     GERD (gastroesophageal reflux  disease)     Irregular heart beat     PAC     Past Surgical History:   Procedure Laterality Date    CHOLECYSTECTOMY      COLONOSCOPY      EGD      EGD AND COLONOSCOPY      KNEE ARTHROSCOPY Bilateral     KNEE SURGERY Left     benign tumor removed     [place] LAPAROSCOPY SURG CHOLECYSTECTOMY N/A 10/9/2020    Procedure: LAPAROSCOPIC CHOLECYSTECTOMY, umbilical hernia repair;  Surgeon: Yo Leyva MD;  Location: [facility];  Service: General    PROSTATE SURGERY      2007     Social History   Social History     Substance and Sexual Activity   Alcohol Use Yes    Alcohol/week: 1.0 standard drink of alcohol    Types: 1 Glasses of wine per week     Social History     Substance and Sexual Activity   Drug Use Never     E-Cigarette/Vaping    E-Cigarette Use Never User      E-Cigarette/Vaping Substances     Social History     Tobacco Use   Smoking Status Never   Smokeless Tobacco Never     Family History: non-contributory}    Meds/Allergies   PTA meds:   Prior to Admission Medications   Prescriptions Last Dose Informant Patient Reported? Taking?   Cholecalciferol (Dialyvite Vitamin D 5000) 125 MCG (5000 UT) capsule  Self Yes No   Sig: Take 5,000 Units by mouth daily     aspirin 81 mg chewable tablet  Self Yes No   Sig: Chew   flecainide (TAMBOCOR) 50 mg tablet   No No   Sig: Take 1 tablet (50 mg total) by mouth in the morning   omeprazole (PriLOSEC) 20 mg delayed release capsule   No No   Sig: TAKE 1 CAPSULE BY MOUTH EVERY DAY   oxazepam (SERAX) 10 mg capsule   No No   Sig: Take 2 capsules (20 mg total) by mouth daily at bedtime as needed for sleep   rosuvastatin (CRESTOR) 10 MG tablet   No No   Sig: Take 1 tablet (10 mg total) by mouth daily   sildenafil (VIAGRA) 100 mg tablet   No No   Sig: Take 1 tablet (100 mg total) by mouth daily as needed for erectile dysfunction   tobramycin-dexamethasone (TOBRADEX) ophthalmic suspension   No No   Sig: Administer 1 drop into the left eye every 4 (four) hours while awake       Facility-Administered Medications: None     No Known Allergies    Objective   Vitals: Blood pressure 131/81, pulse 79, temperature 98.1 °F (36.7 °C), resp. rate 20, SpO2 97%.,There is no height or weight on file to calculate BMI.    Intake/Output Summary (Last 24 hours) at 5/29/2024 0327  Last data filed at 5/29/2024 0250  Gross per 24 hour   Intake --   Output 250 ml   Net -250 ml     Invasive Devices       Peripheral Intravenous Line  Duration             Peripheral IV 05/28/24 Distal;Left;Upper;Ventral (anterior) Antecubital <1 day              Airway  Duration             Supraglottic Airway LMA 4 <1 day                    Physical Exam  Vitals reviewed.   Constitutional:       Appearance: Normal appearance.   HENT:      Head: Normocephalic.      Right Ear: External ear normal.      Left Ear: External ear normal.      Nose: Nose normal.   Eyes:      Extraocular Movements: Extraocular movements intact.   Cardiovascular:      Rate and Rhythm: Normal rate and regular rhythm.      Pulses: Normal pulses.   Pulmonary:      Effort: Pulmonary effort is normal.   Abdominal:      General: There is distension.      Palpations: Abdomen is soft.      Tenderness: There is abdominal tenderness. There is no guarding.   Musculoskeletal:         General: Normal range of motion.      Cervical back: Normal range of motion.   Skin:     General: Skin is warm and dry.   Neurological:      General: No focal deficit present.      Mental Status: He is alert and oriented to person, place, and time.   Psychiatric:         Mood and Affect: Mood normal.         Lab Results: Coags:   Lab Results   Component Value Date    PTT 23 05/29/2024    INR 1.03 05/29/2024   , CBC with diff:   Lab Results   Component Value Date    WBC 13.31 (H) 05/29/2024    HGB 10.5 (L) 05/29/2024    HCT 31.8 (L) 05/29/2024    MCV 90 05/29/2024     05/29/2024    RBC 3.54 (L) 05/29/2024    MCH 29.7 05/29/2024    MCHC 33.0 05/29/2024    RDW 12.1 05/29/2024    MPV  9.2 05/29/2024    NRBC 0 05/29/2024   , BMP/CMP:   Lab Results   Component Value Date    SODIUM 137 05/29/2024    K 4.0 05/29/2024     05/29/2024    CO2 25 05/29/2024    BUN 18 05/29/2024    CREATININE 1.09 05/29/2024    CALCIUM 8.0 (L) 05/29/2024    AST 15 05/29/2024    ALT 17 05/29/2024    ALKPHOS 41 05/29/2024    EGFR 68 05/29/2024     Imaging Studies: I have personally reviewed pertinent reports.  , I have personally reviewed pertinent films in PACS, and I have personally reviewed pertinent films in PACS with a Radiologist.  EKG, Pathology, and Other Studies: I have personally reviewed pertinent reports.    VTE Prophylaxis: Sequential compression device (Venodyne)

## 2024-05-29 NOTE — RESPIRATORY THERAPY NOTE
RT Protocol Note  Alex Arthur 68 y.o. male MRN: [id number]  [facility]: [id number]    Assessment    Active Problems:  There are no active Hospital Problems.      Home Pulmonary Medications:  None per Pt.       Past Medical History:   Diagnosis Date    Colon polyp     GERD (gastroesophageal reflux disease)     Irregular heart beat     PAC     Social History     Socioeconomic History    Marital status: /Civil Union     Spouse name: None    Number of children: None    Years of education: None    Highest education level: None   Occupational History    None   Tobacco Use    Smoking status: Never    Smokeless tobacco: Never   Vaping Use    Vaping status: Never Used   Substance and Sexual Activity    Alcohol use: Yes     Alcohol/week: 1.0 standard drink of alcohol     Types: 1 Glasses of wine per week    Drug use: Never    Sexual activity: None   Other Topics Concern    None   Social History Narrative    Do you currently or have you served in the [employer]:   No      Occupation:   Vascular Surgeon      Advance directive:   Yes      Social Determinants of Health     Financial Resource Strain: Not on file   Food Insecurity: Not on file   Transportation Needs: Not on file   Physical Activity: Not on file   Stress: Not on file   Social Connections: Not on file   Intimate Partner Violence: Not on file   Housing Stability: Not on file       Subjective         Objective    Physical Exam:   Assessment Type: (P) Assess only  Bilateral Breath Sounds: (P) Clear    Vitals:  Blood pressure 131/74, pulse (P) 71, temperature 98.1 °F (36.7 °C), resp. rate 15, weight 85.9 kg (189 lb 6 oz), SpO2 (P) 97%.          Imaging and other studies: I have personally reviewed pertinent reports.            Plan    Respiratory Plan: (P) Discontinue Protocol  Airway Clearance Plan: (P) Discontinue Protocol     Resp Comments: (P) Pt. evaluated for respiratory protocol. BS=clear and well aerated.. Pt. in no distress on RMA.  SpO2=97%. Pt. uses no respiratory mediocations at home. No indication for respiratory intervention at this time. Protocol D/C.

## 2024-05-29 NOTE — PHYSICAL THERAPY NOTE
PHYSICAL THERAPY EVALUATION  NAME:  Alex Arthur  DATE: 05/29/24    AGE:   68 y.o.  Mrn:   [id number]  ADMIT DX:  Bleeding [R58]    Past Medical History:   Diagnosis Date    Colon polyp     GERD (gastroesophageal reflux disease)     Irregular heart beat     PAC     Past Surgical History:   Procedure Laterality Date    CHOLECYSTECTOMY      COLONOSCOPY      EGD      EGD AND COLONOSCOPY      IR MESENTERIC/VISCERAL ANGIOGRAM  5/29/2024    KNEE ARTHROSCOPY Bilateral     KNEE SURGERY Left     benign tumor removed     SD LAPAROSCOPY SURG CHOLECYSTECTOMY N/A 10/9/2020    Procedure: LAPAROSCOPIC CHOLECYSTECTOMY, umbilical hernia repair;  Surgeon: Yo Leyva MD;  Location: [facility];  Service: General    PROSTATE SURGERY      2007       Length Of Stay: 0  PHYSICAL THERAPY EVALUATION :    05/29/24 1035   PT Last Visit   PT Visit Date 05/29/24   Note Type   Note type Evaluation   Pain Assessment   Pain Assessment Tool 0-10   Pain Score 3   Pain Location/Orientation Location: Abdomen   Pain Onset/Description Onset: Ongoing;Descriptor: Discomfort   Patient's Stated Pain Goal No pain   Restrictions/Precautions   Weight Bearing Precautions Per Order No   Other Precautions Multiple lines;Telemetry;Pain   Home Living   Type of Home House   Home Layout Able to live on main level with bedroom/bathroom   Bathroom Shower/Tub Walk-in shower   Bathroom Toilet Standard   Home Equipment   (none)   Additional Comments ranch home w/ ELBA   Prior Function   Level of [place] Independent with functional mobility;Independent with ADLs;Independent with IADLS   Lives With Spouse   Receives Help From Family   IADLs Independent with driving;Independent with meal prep;Independent with medication management   Falls in the last 6 months 0   Vocational Full time employment  (vascular sx)   Comments (+)    Cognition   Overall Cognitive Status WFL   Attention Within functional limits   Orientation Level Oriented X4   Memory Within functional  limits   Following Commands Follows all commands and directions without difficulty   Comments pleasant and motivated- good safety   Subjective   Subjective agreeable and willing for OOB   RUE Assessment   RUE Assessment WFL   LUE Assessment   LUE Assessment WFL   RLE Assessment   RLE Assessment WFL   LLE Assessment   LLE Assessment WFL   Vision-Basic Assessment   Current Vision Wears glasses all the time   Coordination   Movements are Fluid and Coordinated 1   Sensation WFL   Light Touch   RLE Light Touch Grossly intact   LLE Light Touch Grossly intact   Sharp/Dull   RLE Sharp/Dull Grossly intact   LLE Sharp/Dull Grossly intact   Proprioception   RLE Proprioception Grossly intact   LLE Proprioception Grossly Intact   Bed Mobility   Supine to Sit 6  Modified independent   Additional items HOB elevated   Additional Comments sits EOB w/ goo balance 0 dizziness   Transfers   Sit to Stand 5  Supervision   Stand to Sit 5  Supervision   Toilet transfer 5  Supervision  (stands in front of toilet to urinate w/ good balance)   Additional Comments no DME   Ambulation/Elevation   Gait pattern WNL   Gait Assistance 5  Supervision   Assistive Device None   Distance ~40' total throughout room 0 AD stable vitals; 0 LOB.   Balance   Static Sitting Good   Dynamic Sitting Fair +   Static Standing Fair   Dynamic Standing Fair   Ambulatory Fair  (0 AD)   Activity Tolerance   Activity Tolerance Patient tolerated treatment well   Medical Staff Made Aware OT/ CM   Nurse Made Aware yes   Assessment   Prognosis Excellent   Assessment Pt is 68 y.o. male seen for PT evaluation s/p admit to 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  . Pt is  S/p coil embolization with IR. PT consulted for assist w/ mobility and d/c planning recommendations.  Pt   has a past medical history of Colon polyp, GERD (gastroesophageal reflux disease),  and Irregular heart beat. .  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 Francois muñoz use AD for mobility and is indep w/ ADL and all IADLs.  Due to post op management and monitoring in MICU;  pain,  decreased activity tolerance compared to baseline, increased assistance needed from caregiver at current time, continuous monitoring, trending labs;  multiple lines,  note potentially  unstable clinical picture (high complexity). Currently,  pt  is requiring S A for bed skills; S for functional transfers and S for ambulation w/o AD.   Pt presents  currently w/ overall mobility deficits 2* to: pain; lightheadedness;   multiple lines; (Please find additional objective findings from PT assessment regarding body systems outlined above.) Pt is functioning at S levels of mobility/ ambulation w/o AD; anticipate quick recovery to baseline. Further skilled PT interventions not indicated at present as pt functioning at S level. Recommend ongoing ambulation/ activity w/ nursing vs restorative staff as needed until time of d/c. No further PT needs at present. PT signing off.   Barriers to Discharge None   Goals   Patient Goals get better and go home   Discharge Recommendation   Rehab Resource Intensity Level, PT No post-acute rehabilitation needs   AM-PAC Basic Mobility Inpatient   Turning in Flat Bed Without Bedrails 4   Lying on Back to Sitting on Edge of Flat Bed Without Bedrails 4   Moving Bed to Chair 4   Standing Up From Chair Using Arms 4   Walk in Room 3   Climb 3-5 Stairs With Railing 3   Basic Mobility Inpatient Raw Score 22   Basic Mobility Standardized Score 47.4   [facility] Highest Level Of Mobility   -HLM Goal 7: Walk 25 feet or more   -HLM Achieved 7: Walk 25 feet or more   End of Consult   Patient Position at End of Consult Bedside chair;All needs within reach     The patient's AM-PAC Basic Mobility Inpatient Short Form Raw Score is  22. A Raw score of greater than 16 suggests the patient may benefit from discharge to home. Please also refer to the recommendation of the Physical Therapist for safe discharge planning.

## 2024-05-29 NOTE — PROGRESS NOTES
The history and physical were reviewed, along with progress notes, and the patient was examined. There are no changes since it was written.    /81 (BP Location: Right arm)   Pulse 79   Temp 98.1 °F (36.7 °C)   Resp 20   SpO2 97%

## 2024-05-29 NOTE — CASE MANAGEMENT
Case Management Assessment & Discharge Planning Note    Patient name Alex Arthur  Location MICU 10/MICU 10 MRN [id number]  : 1955 Date 2024       Current Admission Date: 2024  Current Admission Diagnosis:Bleeding   Patient Active Problem List    Diagnosis Date Noted Date Diagnosed    Encounter for immunization 2023     Functional dyspepsia 2023     Vitamin D insufficiency 2023     PND (post-nasal drip) 2023     Primary osteoarthritis of right knee 2022     Malignant neoplasm of prostate (HCC) 2021     Subacromial impingement of left shoulder 2021     Pure hypercholesterolemia 2021     Family history of coronary artery disease 2021     Nausea 2020     Burping 2020     Family history of colon cancer 2020     History of colon polyps 2020     Displacement of lumbar intervertebral disc without myelopathy      Pyriformis syndrome, unspecified laterality 2020     Lumbar radiculitis 2020     PAC (premature atrial contraction) 2019       LOS (days): 0  Geometric Mean LOS (GMLOS) (days):   Days to GMLOS:     OBJECTIVE:    Risk of Unplanned Readmission Score: 15.57         Current admission status: Inpatient       Preferred Pharmacy:   [facility]/pharmacy #[id number] - [place] - [address] [address]  [address] [address]  Phone: [phone] Fax: [phone]    Primary Care Provider: HARINDER Pacheco DO    Primary Insurance: BLUE CROSS  Secondary Insurance: MEDICARE    ASSESSMENT:  Active Health Care Proxies    There are no active Health Care Proxies on file.                 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  Pt still working FT.  CM to follow for dcp - anticipate home no CM needs.

## 2024-05-29 NOTE — DISCHARGE INSTRUCTIONS
Embolization   AMBULATORY CARE:   What you need to know about embolization: Embolization is a procedure to create a clot, or block, in a blood vessel. This stops blood from flowing to the area. The procedure may be used to treat many conditions. It can help stop heavy bleeding (hemorrhage), or prevent an aneurysm from rupturing. An abnormal connection between arteries can be removed. Embolization can stop blood flow to a tumor, such as a uterine fibroid or a cancer tumor. Chemotherapy medicine may be given during an embolization to treat a cancer tumor. This is called chemoembolization.  How to prepare for the procedure: Embolization is sometimes done as an emergency procedure. This means you will not have time to prepare. For an embolization that is not an emergency, the following are general guidelines for how to prepare:  Tell your provider about all your allergies. This includes if you have ever had an allergic reaction to contrast liquid, anesthesia, or antibiotics. You may be told not to eat or drink anything after midnight the night before your procedure. Arrange to have someone drive you home. The person should stay with you to help you and watch for problems that may develop.     Give your provider a list of your medicines. Include all medicines and supplements you take. You may need to stop taking blood thinners or aspirin several days before your procedure. This will help decrease your risk for bleeding. Do not stop taking medicines unless your healthcare provider tells you to stop. Your provider will tell you which medicines to take or not take on the day of your procedure.     You may need blood tests to check how well your blood clots and to check your kidney function. Depending on the reason for this procedure, you may an MRI, ultrasound, x-ray, or CT scan. These pictures will help your healthcare provider examine the area to be worked on.     If you are a woman, tell your provider if you know or  think you might be pregnant. You may not be able to have certain tests because they may harm an unborn baby. Your provider may need to take extra precautions for other tests.     What will happen during the procedure:   You may be given general anesthesia to keep you asleep and pain-free. You may instead be given moderate sedation. This means you will be awake during the procedure, but you should not feel any pain. Your provider will put numbing medicine on your skin where the procedure will be done. A small incision will be made over an artery. A catheter (thin tube) will be guided into the artery. Contrast liquid will be used to help your healthcare provider see your arteries more easily.     Your provider will use a type of x-ray that gives a moving picture of the arteries. This will help him or her move the catheter into the right place. The catheter is moved up until it reaches the correct artery. Your provider will put medicine or a material into the artery to slow or stop blood from flowing. This may be a coil, foam, beads, a plug, or liquid. The liquid may also contain material that is larger than blood cells.      Your provider will remove the catheter. Pressure will be used to stop any bleeding that happens. The incision area does not need to be closed with stitches. It will be small and close on its own. It will be covered with a bandage to keep it from becoming infected.     What to expect after the procedure:   You may have pain for a few days. Depending on the reason you had this procedure, you may also have a headache or cramps. You may have pain, bleeding, or bruising where the catheter went into your leg. All of these symptoms are normal and should get better soon. You may be given pain medicine through your IV or a pump. A pump allows you to control when the pain medicine is given.     You should expect to stay in the hospital at least overnight. If you had this procedure to treat heavy bleeding,  it may take 24 hours to know if the bleeding stopped.     Healthcare providers will help you walk around after your procedure. This will help prevent blood clots. Do not get up until healthcare providers say it is okay. They may want you to lie in one position for a certain amount of time. When they say it is okay to walk, they will help you stand and walk safely.     Risks of embolization: You may bleed more than expected or develop an infection. The area being treated may be damaged during the procedure. Your artery may be damaged from the catheter, or you may develop a blood clot. Your kidneys may be damaged from the contrast liquid. The material being put into the artery may go to the wrong place. This can stop blood flow to healthy tissue. The procedure may not work, or it may not relieve your symptoms.  Call your doctor or specialist if:   You have a fever higher than 100.4°F (38°C).     You have a fever, pain, and nausea that last longer than 3 days.     You suddenly have severe abdominal pain.     You cannot urinate, or you urinate very little.     You have signs of an infection at the catheter site, such as red streaks, pain, or swelling.     You have new or worsening pain.     You have questions or concerns about your condition or care.     Medicines: You may need any of the following:  NSAIDs help decrease swelling and pain or fever. This medicine is available with or without a doctor's order. NSAIDs can cause stomach bleeding or kidney problems in certain people. If you take blood thinner medicine, always ask your healthcare provider if NSAIDs are safe for you. Always read the medicine label and follow directions.     Prescription pain medicine may be given. Ask your healthcare provider how to take this medicine safely. Some prescription pain medicines contain acetaminophen. Do not take other medicines that contain acetaminophen without talking to your healthcare provider. Too much acetaminophen may  cause liver damage. Prescription pain medicine may cause constipation. Ask your healthcare provider how to prevent or treat constipation.      Take your medicine as directed. Contact your healthcare provider if you think your medicine is not helping or if you have side effects. Tell him or her if you are allergic to any medicine. Keep a list of the medicines, vitamins, and herbs you take. Include the amounts, and when and why you take them. Bring the list or the pill bottles to follow-up visits. Carry your medicine list with you in case of an emergency.     Self-care:   Rest as needed. Rest and sleep will help your body heal.     Follow your healthcare provider's instructions for activity. He or she will tell you when it is okay to return to your normal activities and to start driving. He or she may want you to wait 1 to 2 weeks to return to work.     Care for the catheter site as directed. It is okay to shower after the procedure. You will only have a small cut in your skin from where the catheter went into your leg. Check the catheter site for signs of infection, including red streaks, pain, and swelling.     Treat symptoms of postembolization syndrome. This syndrome is common after an embolization procedure. It usually starts within 72 hours of the procedure and may last a few days. The main symptoms are fever, pain, and nausea. You will probably be able to manage your symptoms at home. Acetaminophen or an NSAID, such as ibuprofen, can reduce a fever and pain. You may need to eat lightly to manage nausea. Drink more liquids for the first week after the procedure to prevent dehydration.   WOUND CARE     Remove band aid/ dressing tomorrow. You may shower 24 hours after your procedure. Shower and wash groin area or wrist area gently with soap and water: beginning tomorrow. Rinse and pat Dry. Apply new water seal band aid. Repeat this process for 5 days.  If there is any drainage from the puncture site, you should  put on a clean bandage. No Powders, creams, lotions or antibiotic ointments for 5 days.  No tub baths, hot tubs or swimming for 5 days.      Follow up with your doctor or specialist as directed: You may need to have more tests to check if the procedure worked. Write down your questions so you remember to ask them during your visits.  © Copyright [employer] 2020 Information is for End User's use only and may not be sold, redistributed or otherwise used for commercial purposes. All illustrations and images included in CareNotes® are the copyrighted property of [employer] or [employer]  The above information is an  only. It is not intended as medical advice for individual conditions or treatments. Talk to your doctor, nurse or pharmacist before following any medical regimen to see if it is safe and effective for you.

## 2024-05-29 NOTE — PROGRESS NOTES
"Progress Note -Interventional Radiology  Alex Arthur 68 y.o. male MRN: [id number]  Unit/Bed#: MICU 10 Encounter: [id number]    Assessment/Plan:    S/P Ruptured PDA Aneurysm Embolization  -Today the patient is well-appearing and offers no new active complaints or concerns  -Mild abdominal discomfort likely associated with hemoperitoneum  -Right groin access site is clean dry and intact.  No evidence of hematoma, ecchymosis or pulsatile mass.  Pulses are intact.  -Please reach out to interventional radiology with any questions or concerns.  Medical Problems       Problem List       PAC (premature atrial contraction)    Pyriformis syndrome, unspecified laterality    Lumbar radiculitis    Displacement of lumbar intervertebral disc without myelopathy    Nausea    Burping    Family history of colon cancer    History of colon polyps    Pure hypercholesterolemia    Family history of coronary artery disease    Subacromial impingement of left shoulder    Malignant neoplasm of prostate (HCC)    Primary osteoarthritis of right knee    Encounter for immunization    Functional dyspepsia    Vitamin D insufficiency    PND (post-nasal drip)             Subjective: Alex Arthur is a 68 y.o. male who presented with ruptured PDA aneurysm.  Patient underwent emergent PDA embolization last night today the patient reports feeling well and offers no new complaints or concerns.  During follow-up today he is noted to be eating lunch and tolerating p.o. intake.  He does have some mild abdominal discomfort however this is most likely related to hemoperitoneum..    Objective:    Vitals:  /72   Pulse 69   Temp 97.7 °F (36.5 °C) (Oral)   Resp (!) 23   Ht 5' 9\" (1.753 m)   Wt 85.9 kg (189 lb 6 oz)   SpO2 93%   BMI 27.97 kg/m²   Body mass index is 27.97 kg/m².  Weight (last 2 days)       Date/Time Weight    05/29/24 0455 85.9 (189.38)            I/Os:    Intake/Output Summary (Last 24 hours) at 5/29/2024 1606  Last data filed at " 5/29/2024 1254  Gross per 24 hour   Intake 1693.27 ml   Output 250 ml   Net 1443.27 ml       Invasive Devices       Peripheral Intravenous Line  Duration             Peripheral IV 05/28/24 Distal;Left;Upper;Ventral (anterior) Antecubital <1 day    Peripheral IV 05/29/24 Left;Ventral (anterior) Wrist <1 day                    Physical Exam  Vitals and nursing note reviewed.   Constitutional:       General: He is not in acute distress.     Appearance: Normal appearance. He is well-developed and well-groomed.   HENT:      Head: Normocephalic and atraumatic.      Right Ear: External ear normal.      Left Ear: External ear normal.      Nose: Nose normal.      Mouth/Throat:      Lips: Pink.   Eyes:      General: Lids are normal. Gaze aligned appropriately.   Cardiovascular:      Rate and Rhythm: Normal rate.      Pulses: Normal pulses.   Pulmonary:      Effort: Pulmonary effort is normal. No respiratory distress.   Abdominal:      Palpations: Abdomen is soft.      Tenderness: There is no abdominal tenderness.   Skin:     General: Skin is warm.      Capillary Refill: Capillary refill takes less than 2 seconds.      Comments: Right groin access site is clean dry and intact.  No evidence of ecchymosis or hematoma.  No pulsatile mass.  Pulses are intact.    Neurological:      General: No focal deficit present.      Mental Status: He is alert and oriented to person, place, and time. Mental status is at baseline.   Psychiatric:         Behavior: Behavior is cooperative.                     Lab Results and Cultures:   CBC with diff:   Lab Results   Component Value Date    WBC 9.99 05/29/2024    HGB 10.4 (L) 05/29/2024    HCT 32.4 (L) 05/29/2024    MCV 90 05/29/2024     05/29/2024    RBC 3.57 (L) 05/29/2024    MCH 30.0 05/29/2024    MCHC 33.2 05/29/2024    RDW 12.7 05/29/2024    MPV 9.3 05/29/2024    NRBC 0 05/29/2024      BMP/CMP:  Lab Results   Component Value Date     06/11/2015    K 4.5 05/29/2024    K 3.9  "06/11/2015     05/29/2024    CL 99 (L) 06/11/2015    CO2 25 05/29/2024    CO2 31 06/11/2015    ANIONGAP 6 06/11/2015    BUN 19 05/29/2024    BUN 19 06/11/2015    CREATININE 1.14 05/29/2024    CREATININE 1.08 06/11/2015    GLUCOSE 107 06/11/2015    CALCIUM 7.7 (L) 05/29/2024    CALCIUM 9.1 06/11/2015    AST 14 05/29/2024    AST 16 06/11/2015    ALT 15 05/29/2024    ALT 30 06/11/2015    ALKPHOS 41 05/29/2024    ALKPHOS 54 06/11/2015    PROT 7.6 06/11/2015    BILITOT 0.75 06/11/2015    EGFR 64 05/29/2024   ,     Coags:   Lab Results   Component Value Date    PTT 24 05/29/2024    INR 1.10 05/29/2024   ,   Results from last 7 days   Lab Units 05/29/24  0504 05/29/24  0015   PTT seconds 24 23   INR  1.10 1.03        Lipid Panel:   Lab Results   Component Value Date    CHOL 199 06/11/2015     Lab Results   Component Value Date    HDL 75 02/12/2024     Lab Results   Component Value Date    HDL 75 02/12/2024     Lab Results   Component Value Date    LDLCALC 78 02/12/2024     Lab Results   Component Value Date    TRIG 92 02/12/2024       HgbA1c:   Lab Results   Component Value Date    HGBA1C 5.6 08/01/2019    HGBA1C 5.7 07/30/2018    HGBA1C 5.8 07/20/2017       Blood Culture: No results found for: \"BLOODCX\",   Urinalysis:   Lab Results   Component Value Date    COLORU Colorless 10/16/2023    COLORU Yellow 12/29/2015    CLARITYU Clear 10/16/2023    CLARITYU Clear 12/29/2015    SPECGRAV 1.006 10/16/2023    SPECGRAV 1.021 12/29/2015    PHUR 6.5 10/16/2023    PHUR 5.0 08/09/2018    PHUR 6.5 12/29/2015    LEUKOCYTESUR Negative 10/16/2023    LEUKOCYTESUR Negative 12/29/2015    NITRITE Negative 10/16/2023    NITRITE Negative 12/29/2015    PROTEINUA Negative 12/29/2015    GLUCOSEU Negative 10/16/2023    GLUCOSEU Negative 12/29/2015    KETONESU Negative 10/16/2023    KETONESU Negative 12/29/2015    BILIRUBINUR Negative 10/16/2023    BILIRUBINUR Negative 12/29/2015    BLOODU Negative 10/16/2023    BLOODU Negative 12/29/2015   , " "  Urine Culture: No results found for: \"URINECX\",   Wound Culure:  No results found for: \"WOUNDCULT\"    VTE Pharmacologic Prophylaxis: Sequential compression device (Venodyne)       Thank you for allowing me to participate in the care of Alex Arthur. Please don't hesitate to call, text, email, or TigerText with any questions.     "

## 2024-05-29 NOTE — PROGRESS NOTES
Progress Note - Vascular Surgery   Alex Arthur 68 y.o. male [id number]  Unit/Bed#:MICU 10 Encounter: [id number]      Assessment:    68 y.o. with PMH Prostate Ca, lap torres, GERD, Irregular heart beat presenting with hemoperitoneum, rupture PDA aneurysm.     Vascular surgery consulted for PDA Aneurysm.     5/29: Aortogram c PDA Coil Embolization, Right Femoral access    Plan:  - Doing well; still with mild abd pain; hemodynamically stable   - Right groin access site soft   - RLE warm, well perfused; palp fem, DP  - Serial exams  - Hgb 10.7 from 10.5, 11.5  - 1u PRBC this admission   - OOB as tolerated  - Diet: as tolerated; adv slowly   - No evidence of ileus 2/2 hemoperitoneum at this time  - Abx: none  - VTEppx: SCDs; can have chemoVTEppx   - Resume Aspirin, Statin  - Resume Gippx  - Further recommendations pending clinical course      Subjective:    Pt s/e. No acute events overnight. Pt awake, alert.    Pt complains of mild abd pain at incision site. Not OOB.     No new complaints. Denies n/v, sob, chest pain, f/c. No sensory change, numbness, or weakness of lower extremities    I/Os:  No intake/output data recorded.  I/O this shift:  In: 1475.4 [I.V.:1125.4; Blood:350]  Out: 250 [Urine:250]    Review of Systems   Gastrointestinal:  Positive for abdominal pain.       Vitals:    05/29/24 0600   BP: 113/55   Pulse: 72   Resp: 18   Temp:    SpO2: 95%        Physical Exam  Vitals and nursing note reviewed.   Constitutional:       General: He is not in acute distress.     Appearance: He is well-developed. He is not ill-appearing, toxic-appearing or diaphoretic.   HENT:      Head: Normocephalic and atraumatic.   Eyes:      Conjunctiva/sclera: Conjunctivae normal.   Cardiovascular:      Rate and Rhythm: Normal rate and regular rhythm.      Heart sounds: No murmur heard.     Comments: Right palp fem  B/L Palp DP  Pulmonary:      Effort: Pulmonary effort is normal. No respiratory distress.      Breath sounds: Normal  "breath sounds.   Abdominal:      General: There is no distension.      Palpations: Abdomen is soft.      Tenderness: There is abdominal tenderness. There is no guarding or rebound.      Hernia: No hernia is present.   Musculoskeletal:         General: No swelling.      Cervical back: Neck supple.      Right lower leg: No edema.      Left lower leg: No edema.      Comments: Right groin soft, no hematoma, no ecchymosis    Skin:     General: Skin is warm and dry.      Capillary Refill: Capillary refill takes less than 2 seconds.   Neurological:      General: No focal deficit present.      Mental Status: He is alert and oriented to person, place, and time. Mental status is at baseline.   Psychiatric:         Mood and Affect: Mood normal.         Imaging:I have personally reviewed pertinent reports.      Lab Results and Cultures:   Lab Results   Component Value Date    WBC 9.99 05/29/2024    HGB 10.7 (L) 05/29/2024    HCT 32.2 (L) 05/29/2024    MCV 90 05/29/2024     05/29/2024     Lab Results   Component Value Date    GLUCOSE 107 06/11/2015    CALCIUM 7.7 (L) 05/29/2024     06/11/2015    K 4.5 05/29/2024    CO2 25 05/29/2024     05/29/2024    BUN 19 05/29/2024    CREATININE 1.14 05/29/2024     Lab Results   Component Value Date    INR 1.10 05/29/2024    INR 1.03 05/29/2024    PROTIME 14.1 05/29/2024    PROTIME 13.9 05/29/2024        Blood Culture: No results found for: \"BLOODCX\",   Urinalysis:   Lab Results   Component Value Date    COLORU Colorless 10/16/2023    COLORU Yellow 12/29/2015    CLARITYU Clear 10/16/2023    CLARITYU Clear 12/29/2015    SPECGRAV 1.006 10/16/2023    SPECGRAV 1.021 12/29/2015    PHUR 6.5 10/16/2023    PHUR 5.0 08/09/2018    PHUR 6.5 12/29/2015    LEUKOCYTESUR Negative 10/16/2023    LEUKOCYTESUR Negative 12/29/2015    NITRITE Negative 10/16/2023    NITRITE Negative 12/29/2015    PROTEINUA Negative 12/29/2015    GLUCOSEU Negative 10/16/2023    GLUCOSEU Negative 12/29/2015    " "KETONESU Negative 10/16/2023    KETONESU Negative 12/29/2015    BILIRUBINUR Negative 10/16/2023    BILIRUBINUR Negative 12/29/2015    BLOODU Negative 10/16/2023    BLOODU Negative 12/29/2015   ,   Urine Culture: No results found for: \"URINECX\",   Wound Culure: No results found for: \"WOUNDCULT\"    Medications:  Current Facility-Administered Medications   Medication Dose Route Frequency    acetaminophen (TYLENOL) tablet 975 mg  975 mg Oral Q8H NAEL    chlorhexidine (PERIDEX) 0.12 % oral rinse 15 mL  15 mL Mouth/Throat Q12H NAEL    HYDROmorphone HCl (DILAUDID) injection 0.2 mg  0.2 mg Intravenous Q2H PRN    naloxone (NARCAN) 0.04 mg/mL syringe 0.04 mg  0.04 mg Intravenous Q1MIN PRN    ondansetron (ZOFRAN) injection 4 mg  4 mg Intravenous Q4H PRN    oxyCODONE (ROXICODONE) split tablet 2.5 mg  2.5 mg Oral Q4H PRN    Or    oxyCODONE (ROXICODONE) IR tablet 5 mg  5 mg Oral Q4H PRN    senna-docusate sodium (SENOKOT S) 8.6-50 mg per tablet 1 tablet  1 tablet Oral HS       Patient Active Problem List   Diagnosis    PAC (premature atrial contraction)    Pyriformis syndrome, unspecified laterality    Lumbar radiculitis    Displacement of lumbar intervertebral disc without myelopathy    Nausea    Burping    Family history of colon cancer    History of colon polyps    Pure hypercholesterolemia    Family history of coronary artery disease    Subacromial impingement of left shoulder    Malignant neoplasm of prostate (HCC)    Primary osteoarthritis of right knee    Encounter for immunization    Functional dyspepsia    Vitamin D insufficiency    PND (post-nasal drip)       Past Surgical History:   Procedure Laterality Date    CHOLECYSTECTOMY      COLONOSCOPY      EGD      EGD AND COLONOSCOPY      KNEE ARTHROSCOPY Bilateral     KNEE SURGERY Left     benign tumor removed     IL LAPAROSCOPY SURG CHOLECYSTECTOMY N/A 10/9/2020    Procedure: LAPAROSCOPIC CHOLECYSTECTOMY, umbilical hernia repair;  Surgeon: Yo Leyva MD;  Location: [facility]" OR;  Service: General    PROSTATE SURGERY      2007       Family History   Problem Relation Age of Onset    Lymphoma Mother     Lung cancer Father     Heart disease Brother     Other Brother         multisystem atrophy    Colon cancer Maternal Grandmother        Social History     Socioeconomic History    Marital status: /Civil Union     Spouse name: Not on file    Number of children: Not on file    Years of education: Not on file    Highest education level: Not on file   Occupational History    Not on file   Tobacco Use    Smoking status: Never    Smokeless tobacco: Never   Vaping Use    Vaping status: Never Used   Substance and Sexual Activity    Alcohol use: Yes     Alcohol/week: 1.0 standard drink of alcohol     Types: 1 Glasses of wine per week    Drug use: Never    Sexual activity: Not on file   Other Topics Concern    Not on file   Social History Narrative    Do you currently or have you served in the [employer]:   No      Occupation:   Vascular Surgeon      Advance directive:   Yes      Social Determinants of Health     Financial Resource Strain: Not on file   Food Insecurity: Not on file   Transportation Needs: Not on file   Physical Activity: Not on file   Stress: Not on file   Social Connections: Not on file   Intimate Partner Violence: Not on file   Housing Stability: Not on file       No Known Allergies

## 2024-05-29 NOTE — BRIEF OP NOTE (RAD/CATH)
INTERVENTIONAL RADIOLOGY PROCEDURE NOTE    Date: 5/29/2024    Procedure: IR MESENTERIC/VISCERAL ANGIOGRAM     Preoperative diagnosis: No diagnosis found.     Postoperative diagnosis: Same.    Surgeon: Linda Moncada MD     Assistant: None. No qualified resident was available.    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  The vessel was accessed via the SMA, and the segment was successfully embolized with Embold soft microcoils.    A Pro-glide was used for hemostasis.    Complications: None immediate.    Anesthesia: general anesthesia

## 2024-05-29 NOTE — OCCUPATIONAL THERAPY NOTE
Occupational Therapy Evaluation     Patient Name: Alex Arthur  Today's Date: 5/29/2024  Problem List  Active Problems:  There are no active Hospital Problems.    Past Medical History  Past Medical History:   Diagnosis Date    Colon polyp     GERD (gastroesophageal reflux disease)     Irregular heart beat     PAC     Past Surgical History  Past Surgical History:   Procedure Laterality Date    CHOLECYSTECTOMY      COLONOSCOPY      EGD      EGD AND COLONOSCOPY      IR MESENTERIC/VISCERAL ANGIOGRAM  5/29/2024    KNEE ARTHROSCOPY Bilateral     KNEE SURGERY Left     benign tumor removed     ID LAPAROSCOPY SURG CHOLECYSTECTOMY N/A 10/9/2020    Procedure: LAPAROSCOPIC CHOLECYSTECTOMY, umbilical hernia repair;  Surgeon: Yo Leyva MD;  Location[facility];  Service: General    PROSTATE SURGERY      2007 05/29/24 1006   OT Last Visit   OT Visit Date 05/29/24   Note Type   Note type Evaluation   Pain Assessment   Pain Assessment Tool 0-10   Pain Score 3   Pain Location/Orientation Location: Abdomen   Pain Onset/Description Onset: Ongoing;Descriptor: Aching;Descriptor: Discomfort   Patient's Stated Pain Goal No pain   Hospital Pain Intervention(s) Repositioned;Ambulation/increased activity;Emotional support   Restrictions/Precautions   Weight Bearing Precautions Per Order No   Other Precautions Multiple lines;Telemetry;Pain   Home Living   Type of Home House   Home Layout Performs ADLs on one level;Able to live on main level with bedroom/bathroom;One level   Bathroom Shower/Tub Walk-in shower   Bathroom Toilet Standard   Bathroom Equipment Other (Comment)  (denies any)   Home Equipment Other (Comment)  (denies any)   Additional Comments Pt reports living in ranch home w/ ELBA; FFSU   Prior Function   Level of [place] Independent with functional mobility;Independent with ADLs;Independent with IADLS   Lives With Spouse   Receives Help From Family   IADLs Independent with driving;Independent with meal  prep;Independent with medication management   Falls in the last 6 months 0   Vocational Full time employment   Comments (+)    Lifestyle   Autonomy I w/ ADLS/IADLS, transfers and functional mobility PTA   Reciprocal Relationships Pt lives w/ his spouse who is home to assist as needed   Service to 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   ADL   Eating Assistance 7  Independent   Grooming Assistance 7  Independent   UB Bathing Assistance 7  Independent   LB Bathing Assistance 5  Supervision/Setup   UB Dressing Assistance 7  Independent   LB Dressing Assistance 5  Supervision/Setup   Toileting Assistance  7  Independent   Functional Assistance 7  Independent   Bed Mobility   Supine to Sit 6  Modified independent   Additional items HOB elevated   Sit to Supine Unable to assess   Additional Comments pt sat EOB w/ G balance/trunk control   Transfers   Sit to Stand 5  Supervision   Additional items Verbal cues   Stand to Sit 5  Supervision   Additional items Verbal cues   Toilet transfer 5  Supervision  (stood to urinate in front of toilet)   Additional Comments no AD/DME used   Functional Mobility   Functional Mobility 5  Supervision   Additional Comments pt engaged in short household distance functional mobility w/ S; no AD/DME used   Balance   Static Sitting Good   Dynamic Sitting Fair +   Static Standing Fair   Dynamic Standing Fair   Ambulatory Fair   Activity Tolerance   Activity Tolerance Patient tolerated treatment well   Medical Staff Made Aware PT   Nurse Made Aware yes   RUE Assessment   RUE Assessment WFL   LUE Assessment   LUE Assessment WFL   Hand Function   Gross Motor Coordination Functional   Fine Motor Coordination Functional   Sensation   Light Touch No apparent deficits   Proprioception   Proprioception No apparent deficits   Vision-Basic Assessment   Current Vision Wears glasses all the time   Cognition   Overall Cognitive Status WFL    Arousal/Participation Responsive;Cooperative   Attention Within functional limits   Orientation Level Oriented X4   Memory Within functional limits   Following Commands Follows all commands and directions without difficulty   Comments pt is pleasant and cooperative   Assessment   Limitation Decreased endurance   Prognosis Fair   Assessment Pt is a 69 y/o male seen for OT eval s/p adm to [facility] as a [facility] w/ concerns for epigastric and mid abdominal pain radiating into chest. 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 Pt  has a past medical history of Colon polyp, GERD (gastroesophageal reflux disease), and Irregular heart beat. Pt with active OT orders and activity as tolerated orders. Pt lives with his spouse in ranch home w/ ELBA. Pt was I w/  ADLS and IADLS, drove, & required no use of DME PTA. Pt is not currently demonstrating any significant deficits in occupational performance at this time. Overall is functioning at a S level for functional tasks w/o use of AD/DME. Pt reports no major concerns regarding D/C home w/ family support once medically stable. Pt has no continued immediate acute skilled OT needs; will D/C OT.   Goals   Patient Goals to go home   Discharge Recommendation   Rehab Resource Intensity Level, OT No post-acute rehabilitation needs   Additional Comments  The patient's raw score on the AM-PAC Daily Activity   Inpatient Short Form is 24. A raw score of greater than or equal to 19 suggests the patient may benefit from discharge to home. Please refer to the recommendation of the Occupational Therapist for safe discharge planning.   Additional Comments 2 Pt seen as a co-session due to the patient's co-morbidities, clinically unstable presentation, and present impairments which are a regression from the patient's baseline.   AM-PAC Daily Activity Inpatient   Lower Body Dressing 4   Bathing 4   Toileting 4   Upper Body  Dressing 4   Grooming 4   Eating 4   Daily Activity Raw Score 24   Daily Activity Standardized Score (Calc for Raw Score >=11) 57.54   AM-PAC Applied Cognition Inpatient   Following a Speech/Presentation 4   Understanding Ordinary Conversation 4   Taking Medications 4   Remembering Where Things Are Placed or Put Away 4   Remembering List of 4-5 Errands 4   Taking Care of Complicated Tasks 4   Applied Cognition Raw Score 24   Applied Cognition Standardized Score 62.21   End of Consult   Education Provided Yes   Patient Position at End of Consult Bedside chair;All needs within reach   Nurse Communication Nurse aware of consult       Ankita Leggett MS, OTR/L

## 2024-05-29 NOTE — ED PROVIDER NOTES
History  Chief Complaint   Patient presents with    Chest Pain     Pt c/o chest pain, nauseous and dizzy in the last 30 minutes. Hx of PACs.      68-year-old male presents for evaluation of epigastric and mid abdominal pain radiating into his chest, started when he sat up from a sitting position and felt diaphoretic and nauseous as well as lightheaded, pain somewhat resolved and is currently in his left lower abdomen associate with some lightheadedness.  No history of AAA, thoracic aortic aneurysm does have history of symptomatic PVCs on flecainide but no other history of arrhythmias, no personal history of CAD.  No history of smoking, diabetes or high blood pressure.   No history of DVT or PE.  No DVT or PE risk factors  Has been having normal bowel movements with no melena or hematochezia.  Previous abdominal surgeries include cholecystectomy.  Colonoscopy several years ago unremarkable.         Prior to Admission Medications   Prescriptions Last Dose Informant Patient Reported? Taking?   Cholecalciferol (Dialyvite Vitamin D 5000) 125 MCG (5000 UT) capsule  Self Yes No   Sig: Take 5,000 Units by mouth daily     aspirin 81 mg chewable tablet 5/28/2024 Self Yes Yes   Sig: Chew   flecainide (TAMBOCOR) 50 mg tablet   No No   Sig: Take 1 tablet (50 mg total) by mouth in the morning   omeprazole (PriLOSEC) 20 mg delayed release capsule   No No   Sig: TAKE 1 CAPSULE BY MOUTH EVERY DAY   oxazepam (SERAX) 10 mg capsule   No No   Sig: Take 2 capsules (20 mg total) by mouth daily at bedtime as needed for sleep   rosuvastatin (CRESTOR) 10 MG tablet   No No   Sig: Take 1 tablet (10 mg total) by mouth daily   sildenafil (VIAGRA) 100 mg tablet   No No   Sig: Take 1 tablet (100 mg total) by mouth daily as needed for erectile dysfunction   tobramycin-dexamethasone (TOBRADEX) ophthalmic suspension   No No   Sig: Administer 1 drop into the left eye every 4 (four) hours while awake      Facility-Administered Medications: None        Past Medical History:   Diagnosis Date    Colon polyp     GERD (gastroesophageal reflux disease)     Irregular heart beat     PAC       Past Surgical History:   Procedure Laterality Date    CHOLECYSTECTOMY      COLONOSCOPY      EGD      EGD AND COLONOSCOPY      KNEE ARTHROSCOPY Bilateral     KNEE SURGERY Left     benign tumor removed     NM LAPAROSCOPY SURG CHOLECYSTECTOMY N/A 10/9/2020    Procedure: LAPAROSCOPIC CHOLECYSTECTOMY, umbilical hernia repair;  Surgeon: Yo Leyva MD;  Location: [facility];  Service: General    PROSTATE SURGERY      2007       Family History   Problem Relation Age of Onset    Lymphoma Mother     Lung cancer Father     Heart disease Brother     Other Brother         multisystem atrophy    Colon cancer Maternal Grandmother      I have reviewed and agree with the history as documented.    E-Cigarette/Vaping    E-Cigarette Use Never User      E-Cigarette/Vaping Substances     Social History     Tobacco Use    Smoking status: Never    Smokeless tobacco: Never   Vaping Use    Vaping status: Never Used   Substance Use Topics    Alcohol use: Yes     Alcohol/week: 1.0 standard drink of alcohol     Types: 1 Glasses of wine per week    Drug use: Never       Review of Systems   Constitutional:  Positive for diaphoresis. Negative for appetite change, chills and fever.   HENT:  Negative for rhinorrhea and sore throat.    Eyes:  Negative for photophobia and visual disturbance.   Respiratory:  Negative for cough and shortness of breath.    Cardiovascular:  Negative for chest pain and palpitations.   Gastrointestinal:  Positive for abdominal pain and nausea. Negative for diarrhea, rectal pain and vomiting.   Genitourinary:  Negative for dysuria, frequency and urgency.   Skin:  Negative for rash.   Neurological:  Positive for light-headedness. Negative for dizziness and weakness.   All other systems reviewed and are negative.      Physical Exam  Physical Exam  Vitals and nursing note reviewed.    Constitutional:       Appearance: He is well-developed.   HENT:      Head: Normocephalic and atraumatic.      Right Ear: External ear normal.      Left Ear: External ear normal.      Mouth/Throat:      Mouth: Oropharynx is clear and moist.   Eyes:      Extraocular Movements: EOM normal.      Conjunctiva/sclera: Conjunctivae normal.      Pupils: Pupils are equal, round, and reactive to light.   Neck:      Vascular: No JVD.      Trachea: No tracheal deviation.   Cardiovascular:      Rate and Rhythm: Normal rate and regular rhythm.      Pulses:           Dorsalis pedis pulses are 2+ on the right side and 2+ on the left side.      Heart sounds: Normal heart sounds. No murmur heard.     No friction rub. No gallop.      Comments: Bilateral lower extremities well-perfused  Pulmonary:      Effort: Pulmonary effort is normal. No respiratory distress.      Breath sounds: No stridor. No wheezing or rales.   Abdominal:      General: There is no distension.      Palpations: Abdomen is soft. There is no mass.      Tenderness: There is abdominal tenderness. There is no guarding or rebound.      Comments: Left-sided abdominal tenderness mid abdominal and left lower quadrant   Musculoskeletal:         General: No edema. Normal range of motion.      Cervical back: Normal range of motion and neck supple.   Skin:     General: Skin is warm and dry.      Coloration: Skin is not pale.      Findings: No erythema or rash.   Neurological:      Mental Status: He is alert and oriented to person, place, and time.      Cranial Nerves: No cranial nerve deficit.   Psychiatric:         Mood and Affect: Mood and affect normal.         Vital Signs  ED Triage Vitals   Temperature Pulse Respirations Blood Pressure SpO2   05/28/24 2248 05/28/24 2243 05/28/24 2243 05/28/24 2243 05/28/24 2243   97.8 °F (36.6 °C) 71 18 105/76 100 %      Temp Source Heart Rate Source Patient Position - Orthostatic VS BP Location FiO2 (%)   05/28/24 2248 05/28/24 2243  05/28/24 2300 05/28/24 2300 --   Oral Monitor Lying Right arm       Pain Score       05/28/24 2243       7           Vitals:    05/29/24 0000 05/29/24 0030 05/29/24 0115 05/29/24 0130   BP: 155/76 120/72 135/87 152/86   Pulse: 72 65 72 75   Patient Position - Orthostatic VS: Lying Lying Lying          Visual Acuity      ED Medications  Medications   sodium chloride 0.9 % bolus 1,000 mL (0 mL Intravenous Stopped 5/29/24 0119)   iohexol (OMNIPAQUE) 350 MG/ML injection (MULTI-DOSE) 100 mL (100 mL Intravenous Given 5/28/24 2345)   desmopressin (DDAVP) 32.8 mcg in sodium chloride 0.9 % 50 mL IVPB (32.8 mcg Intravenous New Bag 5/29/24 0134)       Diagnostic Studies  Results Reviewed       Procedure Component Value Units Date/Time    HS Troponin I 2hr [[id number]]  (Normal) Collected: 05/29/24 0015    Lab Status: Final result Specimen: Blood from Arm, Left Updated: 05/29/24 0059     hs TnI 2hr 4 ng/L      Delta 2hr hsTnI -5 ng/L     Lactic acid, plasma (w/reflex if result > 2.0) [[id number]]  (Abnormal) Collected: 05/29/24 0015    Lab Status: Final result Specimen: Blood from Arm, Left Updated: 05/29/24 0038     LACTIC ACID 2.2 mmol/L     Narrative:      Result may be elevated if tourniquet was used during collection.    Lactic acid 2 Hours [[id number]]     Lab Status: No result Specimen: Blood     Protime-INR [[id number]]  (Normal) Collected: 05/29/24 0015    Lab Status: Final result Specimen: Blood from Arm, Left Updated: 05/29/24 0034     Protime 13.9 seconds      INR 1.03    APTT [[id number]]  (Normal) Collected: 05/29/24 0015    Lab Status: Final result Specimen: Blood from Arm, Left Updated: 05/29/24 0034     PTT 23 seconds     HS Troponin I 4hr [[id number]]     Lab Status: No result Specimen: Blood     Lipase [[id number]]  (Normal) Collected: 05/28/24 2250    Lab Status: Final result Specimen: Blood from Arm, Left Updated: 05/28/24 2350     Lipase 28 u/L     HS Troponin 0hr (reflex protocol) [[id number]]  (Normal)  Collected: 05/28/24 2250    Lab Status: Final result Specimen: Blood from Arm, Left Updated: 05/28/24 2319     hs TnI 0hr 9 ng/L     Comprehensive metabolic panel [[id number]]  (Abnormal) Collected: 05/28/24 2250    Lab Status: Final result Specimen: Blood from Arm, Left Updated: 05/28/24 2315     Sodium 140 mmol/L      Potassium 3.1 mmol/L      Chloride 104 mmol/L      CO2 29 mmol/L      ANION GAP 7 mmol/L      BUN 19 mg/dL      Creatinine 1.25 mg/dL      Glucose 146 mg/dL      Calcium 8.9 mg/dL      AST 17 U/L      ALT 19 U/L      Alkaline Phosphatase 46 U/L      Total Protein 6.5 g/dL      Albumin 4.2 g/dL      Total Bilirubin 0.35 mg/dL      eGFR 57 ml/min/1.73sq m     Narrative:      National Kid[employer] Foundation guidelines for Chronic Kidney Disease (CKD):     Stage 1 with normal or high GFR (GFR > 90 mL/min/1.73 square meters)    Stage 2 Mild CKD (GFR = 60-89 mL/min/1.73 square meters)    Stage 3A Moderate CKD (GFR = 45-59 mL/min/1.73 square meters)    Stage 3B Moderate CKD (GFR = 30-44 mL/min/1.73 square meters)    Stage 4 Severe CKD (GFR = 15-29 mL/min/1.73 square meters)    Stage 5 End Stage CKD (GFR <15 mL/min/1.73 square meters)  Note: GFR calculation is accurate only with a steady state creatinine    UA w Reflex to Microscopic w Reflex to Culture [[id number]]     Lab Status: No result Specimen: Urine     CBC and differential [[id number]]  (Abnormal) Collected: 05/28/24 2250    Lab Status: Final result Specimen: Blood from Arm, Left Updated: 05/28/24 2256     WBC 8.65 Thousand/uL      RBC 3.96 Million/uL      Hemoglobin 11.5 g/dL      Hematocrit 36.0 %      MCV 91 fL      MCH 29.0 pg      MCHC 31.9 g/dL      RDW 11.9 %      MPV 9.1 fL      Platelets 282 Thousands/uL      nRBC 0 /100 WBCs      Segmented % 58 %      Immature Grans % 1 %      Lymphocytes % 32 %      Monocytes % 8 %      Eosinophils Relative 1 %      Basophils Relative 0 %      Absolute Neutrophils 4.98 Thousands/µL      Absolute Immature  Grans 0.06 Thousand/uL      Absolute Lymphocytes 2.74 Thousands/µL      Absolute Monocytes 0.72 Thousand/µL      Eosinophils Absolute 0.12 Thousand/µL      Basophils Absolute 0.03 Thousands/µL                    CTA dissection protocol chest/abdomen/pelvis   Final Result by Royce Barros MD (05/29 0056)      1.  No thoracoabdominal aortic aneurysm or dissection. Mild scattered calcific atherosclerosis.   2.  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 Moderate to large amount of hemoperitoneum throughout the    perihepatic, perisplenic, bilateral paracolic gutters, and dependent pelvis noted. Source of the large hematoma and hemoperitoneum is likely due to this small aneurysm/pseudoaneurysm.   3.  No acute intrathoracic abnormalities. No bowel obstruction, inflammation, obstructive uropathy, or free air.      Findings discussed with Dr. Saenz at 12:47 a.m.      Workstation performed: [id number]         XR chest 1 view portable    (Results Pending)   IR mesenteric/visceral angiogram    (Results Pending)              Procedures  CriticalCare Time    Date/Time: 5/29/2024 1:16 AM    Performed by: Ashley Saenz DO  Authorized by: Ashley Saenz DO    Critical care provider statement:     Critical care time (minutes):  49    Critical care time was exclusive of:  Separately billable procedures and treating other patients and teaching time    Critical care was necessary to treat or prevent imminent or life-threatening deterioration of the following conditions: acute bleeding anneurysm of the pancreaticoduodenal artery, hemoperitoneum.    Critical care was time spent personally by me on the following activities:  Blood draw for specimens, obtaining history from patient or surrogate, development of treatment plan with patient or surrogate, discussions with consultants, evaluation of  patient's response to treatment, examination of patient, ordering and performing treatments and interventions, ordering and review of laboratory studies, ordering and review of radiographic studies, re-evaluation of patient's condition and review of old charts    I assumed direction of critical care for this patient from another provider in my specialty: no             ED Course  ED Course as of 05/29/24 0249   Tue May 28, 2024   2256 Procedure Note: EKG  Date/Time: 05/28/24 10:56 PM   Performed by: MERCEDEZ DUMONT  Authorized by: MERCEDEZ DUMONT  Indications / Diagnosis: CP  ECG reviewed by me, the ED Provider: yes   The EKG demonstrates:  Rhythm: normal sinus  Intervals: normal intervals  Axis: normal axis  QRS/Blocks: normal QRS  ST Changes: No acute ST Changes, no STD/ELBA.    Nonspecific st changes, inverted T wave in III, v1,v2,v3       2316 Hemoglobin(!): 11.5  Decrease from 14 7 months ago, no melena or hematochezia no acute bleeding   Wed May 29, 2024   0001 Comfortably no acute distress, called radiology for expedited CT read   0103 Contacted vascular fellow on-call, recommends transfer under surgical critical care at [facility] with IR consult currently patient is hemodynamically stable type and screen and blood typing send    0110 Accepted by Dr. Jefferson, [facility]   0114 Remains hemodynamically stable, awaiting transport   0121 Discussed with surgical critical care attending, will give DDAVP as patient is on baby aspirin daily                               SBIRT 20yo+      Flowsheet Row Most Recent Value   Initial Alcohol Screen: US AUDIT-C     1. How often do you have a drink containing alcohol? 0 Filed at: 05/28/2024 2245   2. How many drinks containing alcohol do you have on a typical day you are drinking?  0 Filed at: 05/28/2024 2245   3a. Male UNDER 65: How often do you have five or more drinks on one occasion? 0 Filed at: 05/28/2024 2245   3b. FEMALE Any Age, or MALE 65+: How often do you have 4 or more  drinks on one occassion? 0 Filed at: 05/28/2024 2245   Audit-C Score 0 Filed at: 05/28/2024 2245   VENKATA: How many times in the past year have you...    Used an illegal drug or used a prescription medication for non-medical reasons? Never Filed at: 05/28/2024 2245                      Medical Decision Making  68-year-old male with abdominal pain differential diagnosis includes ACS, arrhythmia, enteritis, pancreatitis, diverticulitis, appendicitis, ureterolithiasis, urinary tract infection given abdominal pain radiating to chest concern for aortic dissection, AAA    Amount and/or Complexity of Data Reviewed  Labs: ordered. Decision-making details documented in ED Course.  Radiology: ordered.    Risk  Prescription drug management.             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 Jefferson.               MD Documentation      Flowsheet Row Most Recent Value   Patient Condition The patient has been stabilized such that within reasonable medical probability, no material deterioration of the patient condition or the condition of the unborn child(shamir) is likely to result from the transfer   Reason for Transfer Level of Care needed not available at this facility   Benefits of Transfer Specialized equipment and/or  services available at the receiving facility (Include comment)________________________  [Vascular Surgery , Surgical ICU]   Risks of Transfer Potential for delay in receiving treatment, Potential deterioration of medical condition, Loss of IV, Increased discomfort during transfer   Accepting Physician Dr. Jefferson   Accepting Facility Name, City & State  [facility]   Sending MD Dr. Saenz   Provider Certification General risk, such as traffic hazards, adverse weather conditions, rough terrain or turbulence, possible failure of equipment (including vehicle or aircraft), or consequences of actions of persons outside the control of the transport personnel, Unanticipated needs of medical equipment and personnel during transport, Risk of worsening condition, The possibility of a transport vehicle being unavailable          RN Documentation      Flowsheet Row Most Recent Value   Accepting Facility Name, City & State  [facility]          Follow-up Information    None         Discharge Medication List as of 5/29/2024  2:17 AM        CONTINUE these medications which have NOT CHANGED    Details   aspirin 81 mg chewable tablet Chew, Historical Med      Cholecalciferol (Dialyvite Vitamin D 5000) 125 MCG (5000 UT) capsule Take 5,000 Units by mouth daily  , Historical Med      flecainide (TAMBOCOR) 50 mg tablet Take 1 tablet (50 mg total) by mouth in the morning, Starting Tue 4/9/2024, No Print      omeprazole (PriLOSEC) 20 mg delayed release capsule TAKE 1 CAPSULE BY MOUTH EVERY DAY, Starting u 12/21/2023, Normal      oxazepam (SERAX) 10 mg capsule Take 2 capsules (20 mg total) by mouth daily at bedtime as needed for sleep, Starting Tue 4/23/2024, Normal      rosuvastatin (CRESTOR) 10 MG tablet Take 1 tablet (10 mg total) by mouth daily, Starting Mon 3/25/2024, Normal      sildenafil (VIAGRA) 100 mg tablet Take 1 tablet (100 mg total) by mouth daily as needed for erectile dysfunction, Starting Tue 11/28/2023, Normal       tobramycin-dexamethasone (TOBRADEX) ophthalmic suspension Administer 1 drop into the left eye every 4 (four) hours while awake, Starting Fri 6/23/2023, Normal             No discharge procedures on file.    PDMP Review         Value Time User    PDMP Reviewed  Yes 4/23/2024 10:19 AM HARINDER Pacheco DO            ED Provider  Electronically Signed by             Ashley Saenz DO  05/29/24 0242

## 2024-05-30 ENCOUNTER — TRANSCRIBE ORDERS (OUTPATIENT)
Dept: VASCULAR SURGERY | Facility: CLINIC | Age: 69
End: 2024-05-30

## 2024-05-30 VITALS
WEIGHT: 183.64 LBS | RESPIRATION RATE: 29 BRPM | TEMPERATURE: 97.7 F | BODY MASS INDEX: 27.2 KG/M2 | DIASTOLIC BLOOD PRESSURE: 68 MMHG | HEIGHT: 69 IN | SYSTOLIC BLOOD PRESSURE: 115 MMHG | HEART RATE: 74 BPM | OXYGEN SATURATION: 96 %

## 2024-05-30 DIAGNOSIS — I72.8 PSEUDOANEURYSM OF INTRA-ABDOMINAL ARTERY (HCC): Primary | ICD-10-CM

## 2024-05-30 LAB
ANION GAP SERPL CALCULATED.3IONS-SCNC: 7 MMOL/L (ref 4–13)
BUN SERPL-MCNC: 21 MG/DL (ref 5–25)
CALCIUM SERPL-MCNC: 8.1 MG/DL (ref 8.4–10.2)
CHLORIDE SERPL-SCNC: 103 MMOL/L (ref 96–108)
CO2 SERPL-SCNC: 26 MMOL/L (ref 21–32)
CREAT SERPL-MCNC: 0.97 MG/DL (ref 0.6–1.3)
ERYTHROCYTE [DISTWIDTH] IN BLOOD BY AUTOMATED COUNT: 13.5 % (ref 11.6–15.1)
GFR SERPL CREATININE-BSD FRML MDRD: 78 ML/MIN/1.73SQ M
GLUCOSE SERPL-MCNC: 113 MG/DL (ref 65–140)
HCT VFR BLD AUTO: 32.1 % (ref 36.5–49.3)
HGB BLD-MCNC: 10.3 G/DL (ref 12–17)
MAGNESIUM SERPL-MCNC: 2.3 MG/DL (ref 1.9–2.7)
MCH RBC QN AUTO: 29.8 PG (ref 26.8–34.3)
MCHC RBC AUTO-ENTMCNC: 32.1 G/DL (ref 31.4–37.4)
MCV RBC AUTO: 93 FL (ref 82–98)
PLATELET # BLD AUTO: 212 THOUSANDS/UL (ref 149–390)
PMV BLD AUTO: 9.2 FL (ref 8.9–12.7)
POTASSIUM SERPL-SCNC: 4.1 MMOL/L (ref 3.5–5.3)
RBC # BLD AUTO: 3.46 MILLION/UL (ref 3.88–5.62)
SODIUM SERPL-SCNC: 136 MMOL/L (ref 135–147)
WBC # BLD AUTO: 13.03 THOUSAND/UL (ref 4.31–10.16)

## 2024-05-30 PROCEDURE — NC001 PR NO CHARGE: Performed by: SURGERY

## 2024-05-30 PROCEDURE — 83735 ASSAY OF MAGNESIUM: CPT

## 2024-05-30 PROCEDURE — 99232 SBSQ HOSP IP/OBS MODERATE 35: CPT | Performed by: EMERGENCY MEDICINE

## 2024-05-30 PROCEDURE — 85027 COMPLETE CBC AUTOMATED: CPT | Performed by: PHYSICIAN ASSISTANT

## 2024-05-30 PROCEDURE — 80048 BASIC METABOLIC PNL TOTAL CA: CPT | Performed by: PHYSICIAN ASSISTANT

## 2024-05-30 PROCEDURE — 99238 HOSP IP/OBS DSCHRG MGMT 30/<: CPT | Performed by: PHYSICIAN ASSISTANT

## 2024-05-30 RX ORDER — OXYCODONE HYDROCHLORIDE 5 MG/1
5 TABLET ORAL EVERY 4 HOURS PRN
Qty: 12 TABLET | Refills: 0 | Status: SHIPPED | OUTPATIENT
Start: 2024-05-30 | End: 2024-06-07 | Stop reason: ALTCHOICE

## 2024-05-30 RX ORDER — ACETAMINOPHEN 325 MG/1
650 TABLET ORAL EVERY 6 HOURS PRN
COMMUNITY
Start: 2024-05-30

## 2024-05-30 RX ORDER — AMOXICILLIN 250 MG
1 CAPSULE ORAL
COMMUNITY
Start: 2024-05-30

## 2024-05-30 RX ADMIN — Medication 1 PACKET: at 10:13

## 2024-05-30 RX ADMIN — FLECAINIDE ACETATE 50 MG: 50 TABLET ORAL at 08:14

## 2024-05-30 RX ADMIN — CHLORHEXIDINE GLUCONATE 15 ML: 1.2 SOLUTION ORAL at 08:14

## 2024-05-30 RX ADMIN — ACETAMINOPHEN 975 MG: 325 TABLET, FILM COATED ORAL at 05:17

## 2024-05-30 RX ADMIN — PANTOPRAZOLE SODIUM 20 MG: 20 TABLET, DELAYED RELEASE ORAL at 05:17

## 2024-05-30 RX ADMIN — ASPIRIN 81 MG CHEWABLE TABLET 81 MG: 81 TABLET CHEWABLE at 08:14

## 2024-05-30 NOTE — QUICK NOTE
Called to patient's bedside for disorientation/confusion. Patient also tachycardic to the 120s, sinus rhythm. Patient neurologically intact and endorses intermittent confusion occasionally throughout the night when he is at home as well. Patient oriented to self and time, disoriented to place and situation. Expresses frustration with staff at bedside as well as anxiety. On assessment, likely a component of medication induced + ICU delirium.

## 2024-05-30 NOTE — PROGRESS NOTES
Progress Note - Vascular Surgery   Alex Arthur 68 y.o. male [id number]  Unit/Bed#:MICU 10 Encounter: [id number]      Assessment:    68 y.o. with PMH Prostate Ca, lap torres, GERD, Irregular heart beat presenting with hemoperitoneum, rupture PDA aneurysm.     Vascular surgery consulted for PDA Aneurysm.     5/29: Aortogram c PDA Coil Embolization, Right Femoral access    Plan:  - Doing well; improving abd pain; hemodynamically stable   - one episode of hypotension overnight, associated with sleeping; asymptomatic    - Right groin access site soft   - RLE warm, well perfused; palp fem, DP  - Serial exams  - Hgb 10.3 from 10.7  - 1u PRBC this admission   - OOB as tolerated  - Diet: as tolerated  - No evidence of ileus due to hemoperitoneum  - Abx: none  - VTEppx: SCDs; can resume chemoVTEppx   - Cont Aspirin, Statin  - Cont Gippx  - Will obtain repeat CTA prior to DC to eval PDA  - Recommend outpatient GI f/u  - Stable for DC to medsurg  - PT/OT - no rehab needs  - Further recommendations pending clinical course      Subjective:    Pt s/e. No acute events overnight. Pt awake, alert.    Pt states abd pain is improving. Filiberto diet. OOBTC. Voiding      No new complaints. Denies n/v, sob, chest pain, f/c. No sensory change, numbness, or weakness of lower extremities    I/Os:  I/O last 3 completed shifts:  In: 1693.3 [P.O.:120; I.V.:1145.4; Blood:350; IV Piggyback:77.9]  Out: 250 [Urine:250]  No intake/output data recorded.    Review of Systems   Gastrointestinal:  Positive for abdominal pain.       Vitals:    05/30/24 0600   BP: 109/66   Pulse: 71   Resp: 21   Temp:    SpO2: 93%        Physical Exam  Vitals and nursing note reviewed.   Constitutional:       General: He is not in acute distress.     Appearance: He is well-developed. He is not ill-appearing, toxic-appearing or diaphoretic.   HENT:      Head: Normocephalic and atraumatic.   Eyes:      Conjunctiva/sclera: Conjunctivae normal.   Cardiovascular:      Rate and  "Rhythm: Normal rate and regular rhythm.      Heart sounds: No murmur heard.     Comments: Right palp fem  B/L Palp DP  Pulmonary:      Effort: Pulmonary effort is normal. No respiratory distress.      Breath sounds: Normal breath sounds.   Abdominal:      General: There is no distension.      Palpations: Abdomen is soft.      Tenderness: There is no abdominal tenderness. There is no guarding or rebound.      Hernia: No hernia is present.   Musculoskeletal:         General: No swelling.      Cervical back: Neck supple.      Right lower leg: No edema.      Left lower leg: No edema.      Comments: Right groin soft, no hematoma, no ecchymosis    Skin:     General: Skin is warm and dry.      Capillary Refill: Capillary refill takes less than 2 seconds.   Neurological:      General: No focal deficit present.      Mental Status: He is alert and oriented to person, place, and time. Mental status is at baseline.   Psychiatric:         Mood and Affect: Mood normal.         Imaging:I have personally reviewed pertinent reports.      Lab Results and Cultures:   Lab Results   Component Value Date    WBC 13.03 (H) 05/30/2024    HGB 10.3 (L) 05/30/2024    HCT 32.1 (L) 05/30/2024    MCV 93 05/30/2024     05/30/2024     Lab Results   Component Value Date    GLUCOSE 107 06/11/2015    CALCIUM 8.1 (L) 05/30/2024     06/11/2015    K 4.1 05/30/2024    CO2 26 05/30/2024     05/30/2024    BUN 21 05/30/2024    CREATININE 0.97 05/30/2024     Lab Results   Component Value Date    INR 1.10 05/29/2024    INR 1.03 05/29/2024    PROTIME 14.1 05/29/2024    PROTIME 13.9 05/29/2024        Blood Culture: No results found for: \"BLOODCX\",   Urinalysis:   Lab Results   Component Value Date    COLORU Colorless 10/16/2023    COLORU Yellow 12/29/2015    CLARITYU Clear 10/16/2023    CLARITYU Clear 12/29/2015    SPECGRAV 1.006 10/16/2023    SPECGRAV 1.021 12/29/2015    PHUR 6.5 10/16/2023    PHUR 5.0 08/09/2018    PHUR 6.5 12/29/2015    " "LEUKOCYTESUR Negative 10/16/2023    LEUKOCYTESUR Negative 12/29/2015    NITRITE Negative 10/16/2023    NITRITE Negative 12/29/2015    PROTEINUA Negative 12/29/2015    GLUCOSEU Negative 10/16/2023    GLUCOSEU Negative 12/29/2015    KETONESU Negative 10/16/2023    KETONESU Negative 12/29/2015    BILIRUBINUR Negative 10/16/2023    BILIRUBINUR Negative 12/29/2015    BLOODU Negative 10/16/2023    BLOODU Negative 12/29/2015   ,   Urine Culture: No results found for: \"URINECX\",   Wound Culure: No results found for: \"WOUNDCULT\"    Medications:  Current Facility-Administered Medications   Medication Dose Route Frequency    acetaminophen (TYLENOL) tablet 975 mg  975 mg Oral Q8H NAEL    aspirin chewable tablet 81 mg  81 mg Oral Daily    atorvastatin (LIPITOR) tablet 40 mg  40 mg Oral Daily With Dinner    chlorhexidine (PERIDEX) 0.12 % oral rinse 15 mL  15 mL Mouth/Throat Q12H NAEL    flecainide (TAMBOCOR) tablet 50 mg  50 mg Oral QAM    HYDROmorphone HCl (DILAUDID) injection 0.2 mg  0.2 mg Intravenous Q2H PRN    naloxone (NARCAN) 0.04 mg/mL syringe 0.04 mg  0.04 mg Intravenous Q1MIN PRN    ondansetron (ZOFRAN) injection 4 mg  4 mg Intravenous Q4H PRN    oxyCODONE (ROXICODONE) split tablet 2.5 mg  2.5 mg Oral Q4H PRN    Or    oxyCODONE (ROXICODONE) IR tablet 5 mg  5 mg Oral Q4H PRN    pantoprazole (PROTONIX) EC tablet 20 mg  20 mg Oral Early Morning    senna-docusate sodium (SENOKOT S) 8.6-50 mg per tablet 1 tablet  1 tablet Oral HS       Patient Active Problem List   Diagnosis    PAC (premature atrial contraction)    Pyriformis syndrome, unspecified laterality    Lumbar radiculitis    Displacement of lumbar intervertebral disc without myelopathy    Nausea    Burping    Family history of colon cancer    History of colon polyps    Pure hypercholesterolemia    Family history of coronary artery disease    Subacromial impingement of left shoulder    Malignant neoplasm of prostate (HCC)    Primary osteoarthritis of right knee    " Encounter for immunization    Functional dyspepsia    Vitamin D insufficiency    PND (post-nasal drip)       Past Surgical History:   Procedure Laterality Date    CHOLECYSTECTOMY      COLONOSCOPY      EGD      EGD AND COLONOSCOPY      IR MESENTERIC/VISCERAL ANGIOGRAM  5/29/2024    KNEE ARTHROSCOPY Bilateral     KNEE SURGERY Left     benign tumor removed     ND LAPAROSCOPY SURG CHOLECYSTECTOMY N/A 10/9/2020    Procedure: LAPAROSCOPIC CHOLECYSTECTOMY, umbilical hernia repair;  Surgeon: Yo Leyva MD;  Location: [facility];  Service: General    PROSTATE SURGERY      2007       Family History   Problem Relation Age of Onset    Lymphoma Mother     Lung cancer Father     Heart disease Brother     Other Brother         multisystem atrophy    Colon cancer Maternal Grandmother        Social History     Socioeconomic History    Marital status: /Civil Union     Spouse name: Not on file    Number of children: Not on file    Years of education: Not on file    Highest education level: Not on file   Occupational History    Not on file   Tobacco Use    Smoking status: Never    Smokeless tobacco: Never   Vaping Use    Vaping status: Never Used   Substance and Sexual Activity    Alcohol use: Yes     Alcohol/week: 1.0 standard drink of alcohol     Types: 1 Glasses of wine per week    Drug use: Never    Sexual activity: Not on file   Other Topics Concern    Not on file   Social History Narrative    Do you currently or have you served in the [employer]:   No      Occupation:   Vascular Surgeon      Advance directive:   Yes      Social Determinants of Health     Financial Resource Strain: Not on file   Food Insecurity: Not on file   Transportation Needs: Not on file   Physical Activity: Not on file   Stress: Not on file   Social Connections: Not on file   Intimate Partner Violence: Not on file   Housing Stability: Not on file       No Known Allergies

## 2024-05-30 NOTE — UTILIZATION REVIEW
Initial Clinical Review    Admission: Date/Time/Statement:   Admission Orders (From admission, onward)       Ordered        05/29/24 0450  INPATIENT ADMISSION  Once                          Orders Placed This Encounter   Procedures    INPATIENT ADMISSION     Standing Status:   Standing     Number of Occurrences:   1     Order Specific Question:   Level of Care     Answer:   Critical Care [15]     Order Specific Question:   Estimated length of stay     Answer:   More than 2 Midnights     Order Specific Question:   Certification     Answer:   I certify that inpatient services are medically necessary for this patient for a duration of greater than two midnights. See H&P and MD Progress Notes for additional information about the patient's course of treatment.     ED Arrival Information       Expected   5/29/2024     Arrival   5/29/2024 02:25    Acuity   Emergent              Means of arrival   Ambulance    Escorted by   [facility]    Service   Critical Care/ICU    Admission type   Emergency              Arrival complaint   Hemoperitoneum             Chief Complaint   Patient presents with    Medical Problem     Pt sent over to go to IR for hemopertitoneum. Abdominal pseudoaneurysm        Initial Presentation: transferred from [facility]  68 yom to [facility] ER for evaluation of epigastric and mid abdominal pain radiating into his chest, started when he sat up from a sitting position and felt diaphoretic and nauseous as well as lightheaded, pain somewhat resolved and is currently in his left lower abdomen associate with some lightheadedness. 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  The vessel was accessed  via the SMA, and the segment was successfully embolized with Embold soft microcoils.     Date: 5/30/24   Day 2:   Ruptured PDA Aneurysm s/p embolization procedure. Groin access site c/d/I, no hematoma or overlying ecchymosis. Pain controlled. Episode confusion/ICU delirium overnight, easily redirectable, now back to baseline. Continue to monitor dressing, pain mgt, delirium precautions. Follow labs.    ED Triage Vitals   Temperature Pulse Respirations Blood Pressure SpO2   05/29/24 0230 05/29/24 0230 05/29/24 0230 05/29/24 0230 05/29/24 0230   98.1 °F (36.7 °C) 79 20 131/81 97 %      Temp Source Heart Rate Source Patient Position - Orthostatic VS BP Location FiO2 (%)   05/29/24 0500 05/29/24 0230 05/29/24 0230 05/29/24 0230 --   Oral Monitor Lying Right arm       Pain Score       05/29/24 0500       No Pain          Wt Readings from Last 1 Encounters:   05/30/24 83.3 kg (183 lb 10.3 oz)     Additional Vital Signs:   Date/Time Temp Pulse Resp BP MAP (mmHg) SpO2 O2 Device Patient Position - Orthostatic VS   05/30/24 0800 97.7 °F (36.5 °C) 74 29 Abnormal  -- -- 96 % None (Room air) --   05/30/24 0600 -- 71 21 109/66 84 93 % -- --   05/30/24 0500 98.1 °F (36.7 °C) 78 19 134/65 93 93 % None (Room air) Lying   05/30/24 0400 -- 81 16 122/63 88 95 % -- --   05/30/24 0300 -- 116 Abnormal  -- 176/92 Abnormal  126 96 % -- --   05/30/24 0200 -- 73 14 105/63 81 90 % -- --   05/29/24 2332 97.8 °F (36.6 °C) 84 16 -- -- 92 % -- --   05/29/24 2300 -- 62 14 91/51 66 92 % -- --   05/29/24 2200 -- 68 -- 113/68 86 96 % -- --   05/29/24 2100 -- 96 30 Abnormal  132/84 102 95 % -- --   05/29/24 2000 98.1 °F (36.7 °C) 69 18 126/65 89 93 % None (Room air) Sitting   05/29/24 1900 -- 70 20 115/62 82 94 % -- --   05/29/24 1800 -- 71 29 Abnormal  110/65 83 94 % -- --   05/29/24 1700 -- 78 28 Abnormal  130/76 97 95 % -- --   05/29/24 1600 -- 80 32 Abnormal  124/68 90 94 % -- --   05/29/24 1553 97.7 °F (36.5 °C) -- -- -- -- -- -- --   05/29/24  1400 -- 69 23 Abnormal  118/72 91 93 % -- --   05/29/24 1300 -- 66 20 113/70 87 94 % -- --   05/29/24 1200 97.8 °F (36.6 °C) 77 27 Abnormal  103/60 76 95 % -- --   05/29/24 1100 -- 70 20 120/76 93 95 % -- --   05/29/24 1000 -- 110 Abnormal  37 Abnormal  -- -- 96 % -- --   05/29/24 0900 -- 75 25 Abnormal  119/61 85 94 % -- --   05/29/24 0800 97.7 °F (36.5 °C) 73 24 Abnormal  108/72 86 95 % None (Room air) Lying   05/29/24 0700 -- 76 15 118/56 79 96 % -- --   05/29/24 0600 -- 72 18 113/55 79 95 % -- --   05/29/24 0511 -- 71 -- -- -- 97 % -- --   05/29/24 0500 97.9 °F (36.6 °C) 76 16 127/59 85 95 % -- --   05/29/24 0435 -- 68 15 131/74 -- 97 % None (Room air) --   05/29/24 0230 98.1 °F (36.7 °C) 79 20 131/81 -- 97 % None (Room air) Lying     Pertinent Labs/Diagnostic Test Results:   IR mesenteric/visceral angiogram   Final Result by Linda Moncada MD (05/29 1349)   Impression: Abnormal inferior pancreaticoduodenal artery with a segment of spasm and 2 areas of focal aneurysmal dilatation. The area was successfully superselectively embolized with microcoils as described.       Results from last 7 days   Lab Units 05/30/24  0531 05/29/24  1115 05/29/24  0504 05/29/24  0242 05/28/24  2250   WBC Thousand/uL 13.03*  --  9.99 13.31* 8.65   HEMOGLOBIN g/dL 10.3* 10.4* 10.7* 10.5* 11.5*   HEMATOCRIT % 32.1* 32.4* 32.2* 31.8* 36.0*   PLATELETS Thousands/uL 212  --  202 214 282   TOTAL NEUT ABS Thousands/µL  --   --   --  11.72* 4.98   BANDS PCT %  --   --  3  --   --      Results from last 7 days   Lab Units 05/30/24  0531 05/29/24  0504 05/29/24  0242 05/28/24  2250   SODIUM mmol/L 136 139 137 140   POTASSIUM mmol/L 4.1 4.5 4.0 3.1*   CHLORIDE mmol/L 103 108 105 104   CO2 mmol/L 26 25 25 29   ANION GAP mmol/L 7 6 7 7   BUN mg/dL 21 19 18 19   CREATININE mg/dL 0.97 1.14 1.09 1.25   EGFR ml/min/1.73sq m 78 64 68 57   CALCIUM mg/dL 8.1* 7.7* 8.0* 8.9   MAGNESIUM mg/dL 2.3 1.7*  --   --    PHOSPHORUS mg/dL  --  3.2  --   --       Results from last 7 days   Lab Units 05/29/24  0504 05/29/24  0242 05/28/24  2250   AST U/L 14 15 17   ALT U/L 15 17 19   ALK PHOS U/L 41 41 46   TOTAL PROTEIN g/dL 5.4* 5.7* 6.5   ALBUMIN g/dL 3.6 3.9 4.2   TOTAL BILIRUBIN mg/dL 0.49 0.41 0.35     Results from last 7 days   Lab Units 05/29/24  0728   POC GLUCOSE mg/dl 144*     Results from last 7 days   Lab Units 05/30/24  0531 05/29/24  0504 05/29/24  0242 05/28/24  2250   GLUCOSE RANDOM mg/dL 113 149* 165* 146*     Results from last 7 days   Lab Units 05/29/24  0015 05/28/24  2250   HS TNI 0HR ng/L  --  9   HS TNI 2HR ng/L 4  --    HSTNI D2 ng/L -5  --      Results from last 7 days   Lab Units 05/29/24  0504 05/29/24  0015   PROTIME seconds 14.1 13.9   INR  1.10 1.03   PTT seconds 24 23     Results from last 7 days   Lab Units 05/29/24  0015   LACTIC ACID mmol/L 2.2*     Results from last 7 days   Lab Units 05/29/24  0555   UNIT PRODUCT CODE  [id number]  [id number]   UNIT NUMBER  [id number]  [id number]   UNITABO  B  B   UNITRH  POS  POS   CROSSMATCH  Compatible  Compatible   UNIT DISPENSE STATUS  Return to Inv  Presumed Trans   UNIT PRODUCT VOL mL 350  350     Results from last 7 days   Lab Units 05/28/24  2250   LIPASE u/L 28     ED Treatment:   Medication Administration from 05/29/2024 0205 to 05/29/2024 0433         Date/Time Order Dose Route Action     05/29/2024 0313 EDT lidocaine 1% buffered 10 mL Infiltration Given     05/29/2024 0417 EDT iodixanol (VISIPAQUE) 320 MG/ML injection 200 mL 68 mL Intravenous Given          Past Medical History:   Diagnosis Date    Colon polyp     GERD (gastroesophageal reflux disease)     Irregular heart beat     PAC     Admitting Diagnosis: Bleeding [R58]  Age/Sex: 68 y.o. male  Admission Orders:  Nursing dysphagia screen  Neuro checks q2h  Pt/ot eval & tx  Scd/foot pumps  Puncture site care: 1)  Dress puncture site with clear dressing or bandage. 2)  Do not use sandbag or apply pressure dressing.  3)  Remove  dressing 24 hours after procedure and replace with water seal band aid     Scheduled Medications:  acetaminophen, 975 mg, Oral, Q8H NAEL  aspirin, 81 mg, Oral, Daily  atorvastatin, 40 mg, Oral, Daily With Dinner  chlorhexidine, 15 mL, Mouth/Throat, Q12H NAEL  flecainide, 50 mg, Oral, QAM  pantoprazole, 20 mg, Oral, Early Morning  psyllium, 1 packet, Oral, Daily  senna-docusate sodium, 1 tablet, Oral, HS    PRN Meds:  HYDROmorphone, 0.2 mg, Intravenous, Q2H PRN  naloxone, 0.04 mg, Intravenous, Q1MIN PRN  ondansetron, 4 mg, Intravenous, Q4H PRN  oxyCODONE, 2.5 mg, Oral, Q4H PRN   Or  oxyCODONE, 5 mg, Oral, Q4H PRN    Network Utilization Review Department  ATTENTION: Please call with any questions or concerns to [phone] and carefully listen to the prompts so that you are directed to the right person. All voicemails are confidential.   For Discharge needs, contact Care Management DC Support Team at [phone] opt. [phone]  Send all requests for admission clinical reviews, approved or denied determinations and any other requests to dedicated fax number below belonging to the campus where the patient is receiving treatment. List of dedicated fax numbers for the Facilities:  FACILITY NAME UR FAX NUMBER   ADMISSION DENIALS (Administrative/Medical Necessity) [phone]   DISCHARGE SUPPORT TEAM (NETWORK) [phone]   PARENT CHILD HEALTH (Maternity/NICU/Pediatrics) [phone]   [facility] [phone]   [facility] [phone]   [facility] [phone]   [facility] [phone]   [facility] [phone]   [facility] [phone]   [facility] [phone]   [facility] [phone]   [facility] [phone]   [facility] [phone]   [facility]  [facility] [phone]   [facility] [phone]

## 2024-05-30 NOTE — DISCHARGE INSTR - AVS FIRST PAGE
DISCHARGE INSTRUCTIONS  ARTERIOGRAM/ANGIOPLASTY/STENT    ACTIVITY: On the evening following the procedure, you should be mostly resting.  Someone should remain with you during the evening and overnight following the procedure.     On the day after your procedure, limit your activity to walking.  Avoid heavy lifting (no more than 15 lbs) for the first three days. Walking up steps and normal activities may be resumed as you feel ready.   You should not drive a car for at least two days following discharge from the hospital. You may ride in a car.   If you have any questions regarding a particular activity, please discuss with your doctor or nurse before you are discharged.    DIET:  Resume your normal diet.  Drink more water than usual for the next 24 hours.    PROCEDURE SITE: You may have a procedure site in your groin, arm, or foot.  You may have surgical glue at your procedure site.  The glue is used to cover the procedure site, assist in closure, and prevent contamination. This adhesive will darken and peel away on its own within one to two weeks. Do not pick at it.    You should shower daily.  Wash incision daily with soap and water, but do not rub or scrub the incision; rinse thoroughly and pat dry.  Do not bathe in a tub or swim for the first 2 week following your procedure or if you have any open wounds.  It is normal to have some bruising, swelling or discoloration around the procedure site.  IF increasing redness, pain, or a bulge develops, call our office immediately.    If present, you may remove the band-aid or “steri-strips” over your procedure site after two days.   If you notice any active bleeding at the site, apply pressure to the site and call our office ([phone]) or [phone].    FOLLOW UP STUDIES:  Your doctor will discuss whether further treatments or follow-up studies are necessary at your first post procedure visit.    FOLLOW UP APPOINTMENTS:  Making and keeping follow up appointments and  ultrasound tests are important to your recovery.  If you have difficulty making it to or keeping your follow up appointments, call the office.    If you have increased pain, fever >101.5, increased drainage, redness or a bad smell at your surgery site, new coldness/numbness of your arm or leg, please call us immediately and GO directly to the ER.    PLEASE CALL THE OFFICE IF YOU HAVE ANY QUESTIONS  [phone]  [phone] [address], [address], [place] [address]  [address] [facility], [address], [place] [address]  [address] [place] [address]  [address] [place] [address], [address], [place] [address]  [address] [facility], [place] [address]  [address] [facility], 2nd Floor, [address], [place] [address]  [address] [facility], [address], [place] [address]  [address] [facility], 1st Floor, [address], [place] [address]  [address], [facility], [place] [address]  [address] [address]

## 2024-05-30 NOTE — NURSING NOTE
Discharge instructions verbally given to patient, prescriptions for blood works given to patient, saline lock removed. Discharge instructions about regular diet, no heavy lifting more than 10 lbs, ff up with vascular MD, GI and CTscan, blood works. Pt. Verbalized understanding on all instructions

## 2024-05-30 NOTE — DISCHARGE SUMMARY
Discharge Summary   Alex Arthur 68 y.o. male MRN: [id number]  Unit/Bed#: MICU 10 Encounter: [id number]    Admission Date: 5/29/2024     Discharge Date:05/30/24    Attending:Lauryn Ullrich, DO     Consultants:   Interventional radiology  Surgical critical care medicine    Admitting Diagnosis: Bleeding [R58]    Principle/ Secondary Diagnosis:  Ruptured PDA pseudoaneurysm  Hemoperitoneum  acute blood loss anemia   Acute post-procedural pain  Medication induced/ ICU induced delerium    Past Medical History:   Diagnosis Date    Colon polyp     GERD (gastroesophageal reflux disease)     Hypercholesteremia     Irregular heart beat     PAC    Lumbar radiculitis     OA (osteoarthritis)     Prostate CA (HCC)     Pyriformis syndrome     Shoulder impingement, left      Past Surgical History:   Procedure Laterality Date    CHOLECYSTECTOMY      COLONOSCOPY      EGD      EGD AND COLONOSCOPY      IR MESENTERIC/VISCERAL ANGIOGRAM  5/29/2024    KNEE ARTHROSCOPY Bilateral     KNEE SURGERY Left     benign tumor removed     AR LAPAROSCOPY SURG CHOLECYSTECTOMY N/A 10/9/2020    Procedure: LAPAROSCOPIC CHOLECYSTECTOMY, umbilical hernia repair;  Surgeon: Yo Leyva MD;  Location: [facility];  Service: General    PROSTATE SURGERY      2007       Procedures Performed:   5/29/2024 Mesenteric/ visceral angiogram with PDA coil embolization- IR        Laboratory data at discharge:   Results from last 7 days   Lab Units 05/30/24  0531 05/29/24  1115 05/29/24  0504 05/29/24  0242   WBC Thousand/uL 13.03*  --  9.99 13.31*   HEMOGLOBIN g/dL 10.3* 10.4* 10.7* 10.5*   HEMATOCRIT % 32.1* 32.4* 32.2* 31.8*   PLATELETS Thousands/uL 212  --  202 214     Results from last 7 days   Lab Units 05/30/24  0531 05/29/24  0504 05/29/24  0242   POTASSIUM mmol/L 4.1 4.5 4.0   CHLORIDE mmol/L 103 108 105   CO2 mmol/L 26 25 25   BUN mg/dL 21 19 18   CREATININE mg/dL 0.97 1.14 1.09   CALCIUM mg/dL 8.1* 7.7* 8.0*     Results from last 7 days   Lab Units  05/29/24  0504 05/29/24  0015   INR  1.10 1.03   PTT seconds 24 23           Discharge instructions/Information to patient and family:   See after visit summary for information provided to patient and family.   CTA abdomen/pelvis scheduled on 6/6/2024 at 7 PM at the [facility]  CBC/differential, BMP, amylase to be done in approximately 2-3 weeks (6/17/2024)  Outpatient GI referral to Dr. Jorje Byers, to be seen in 2 weeks    Discharge Medications:  See after visit summary for reconciled discharge medications provided to patient and family.    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  This was accompanied by diaphoresis, lightheadedness, and nausea.  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  There was moderate to large amount of hemoperitoneum.  He received volume resuscitation as well as DDAVP given daily aspirin usage.  With these radiologic findings, the patient was transferred priority 1 status to [facility] for escalated vascular care.    Upon arrival at the [facility], he was evaluated by both vascular surgery and critical care medicine.  He was taken urgently to the interventional radiology suite where he underwent mesenteric/visceral angiogram with embolization of the pancreaticoduodenal artery aneurysm with Embold soft microcoils.  A Proglide was used for right femoral access hemostasis.    Postprocedure,  he was maintained in the medical ICU where he continued to convalesce for the remainder of his hospitalization.  He continued to be closely monitored.  He remained stable.  Diet was advanced, fortunately without evidence of ileus due to hemoperitoneum.  Unfortunately, etiology of proximal vessel rupture was unclear.  The PDA vessel is small in caliber.  Additional vasculature was normal.  Decision was made for further repeat interval imaging with CTA abd/ pelvis in approximately 1 week, outpatient GI f/u, and further laboratory evaluation w/ CBC/d, BMP, and amylase.     Hospital course was complicated by the following:  Medication induced/ ICU delirium  Acute blood loss anemia  Acute postprocedural pain    By the date of discharge, he remained hemodynamically stable.  Abdominal pain was improved.  Access site was without hematoma.  He was tolerating a diet.  He was ambulatory.  H/H remained stable. He was deemed propria and stable for discharge and was discharged in the care of his wife on 5/30/2024.    Prior to discharge, the patient was given instructions for outpatient care and follow-up.  The patient has been instructed to call w/ any questions, changes, or concerns for the medical condition.    For further details of the hospitalization, please refer to the medical record.    Condition at Discharge: stable     Provisions for Follow-Up Care:  See after visit summary for information related to follow-up care and any pertinent home health orders.      Disposition: Home    Planned Readmission: No    Discharge Statement   I spent 30 minutes discharging the patient. This time was spent on the day of discharge. I had direct contact with the patient on the day of discharge. Additional documentation is required if more than 30 minutes were spent on discharge.     Angie Garay PA-C  5/30/2024      This text is generated with voice recognition software. There may be translation, syntax,  or grammatical errors.  If you have any questions, please contact the dictating provider.

## 2024-05-30 NOTE — UTILIZATION REVIEW
NOTIFICATION OF INPATIENT ADMISSION   AUTHORIZATION REQUEST   SERVICING FACILITY:   [facility]  Address: [address] [place] [address]  Tax ID: [id number]  NPI: [id number] ATTENDING PROVIDER:  Attending Name and NPI#: Lauryn Ullrich, Do [[id number]]  Address: [address] [place] [address]  Phone: [phone]   ADMISSION INFORMATION:  Place of Service: Inpatient [facility]  Place of Service Code: 21  Inpatient Admission Date/Time: 5/29/24  4:33 AM  Discharge Date/Time: No discharge date for patient encounter.  Admitting Diagnosis Code/Description:  Bleeding [R58]     UTILIZATION REVIEW CONTACT:  [place] Mast Utilization   Network Utilization Review Department  Phone: [phone]  Fax: [phone]  Email: [email or web address]  Contact for approvals/pending authorizations, clinical reviews, and discharge.     PHYSICIAN ADVISORY SERVICES:  Medical Necessity Denial & Ueej-rf-Xrzs Review  Phone: [phone]  Fax: [phone]  Email: [email or web address]     DISCHARGE SUPPORT TEAM:  For Patients Discharge Needs & Updates  Phone: [phone] opt. 2 Fax: [phone]  Email: [email or web address]

## 2024-05-30 NOTE — PROGRESS NOTES
[facility]  Progress Note: Critical Care  Name: Alex Arthur 68 y.o. male I MRN: [id number]  Unit/Bed#: MICU 10 I Date of Admission: 5/29/2024   Date of Service: 5/30/2024 I Hospital Day: 1    Assessment & Plan   Neuro:   Diagnosis: Acute Post procedural pain  Plan:   Analgesia: tylenol, oxycodone 2.5/5mg, dilaudid 0.2mg,   Maintain delirium precautions, CAM-ICU daily, regulate sleep wake cycle    CV:   Diagnosis: Ruptured PDA Aneurysm s/p embolization, hypercholesterolemia, PACs,    Vascular Surgery and IR following  Plan for psb repeat CTA either during this admissision vs outpt f/u  Liberalize q6 h/h, now stable- daily check  Diagnosis: PACs, Irregular heartbeat  Now on flecainide 50mg, ASA 81mg  Diagnosis: Hypercholesterolemia  Statin 10mg daily.     Plan:  Diagnosis: no active issues  Plan:   Maintain spO2 >92%    GI:   Diagnosis: no active issues  Plan:   Monitor abd exam, changes in pain or inc n/v  Bowel regimen    :   Diagnosis: no active diagnosis, hx of prostate cancer  Plan:   Monitor I/O  Trend Cr; improved from day prior    F/E/N:   Diagnosis:   Plan:   F: prn Fluids resuscitation as needed  E: replete lyte per protocol  N: regular diet    Heme/Onc:   Diagnosis: ruptured GDA aneurysm w/ ABLA/hematoma formation  Plan:   Trend Hgb daily now  DVT ppx initiation appropriate    Endo:   Diagnosis: no active issues  Plan:   Monitor blood glucose 140-180 goal    ID:   Diagnosis: no active issues    MSK/Skin:   Diagnosis: Lumbar Radiculitis, Pyriformis Syndrome, L shoulder Impingement, OA  Plan:   OOB to chair  Ambulate TID     Disposition: Stepdown Level 2 stable to transfer out of ICU: Vascular Surgery accepting as primary     ICU Core Measures     A: Assess, Prevent, and Manage Pain Has pain been assessed? Yes  Need for changes to pain regimen? No   B: Both SAT/SAT  N/A   C: Choice of Sedation RASS Goal: 0 Alert and Calm  Need for changes to sedation or analgesia  regimen? No   D: Delirium CAM-ICU: Negative   E: Early Mobility  Plan for early mobility? Yes   F: Family Engagement Plan for family engagement today? Yes         Prophylaxis:  VTE Recent GDA rupture   Stress Ulcer  covered byomeprazole (PriLOSEC) 20 mg delayed release capsule [[id number]] (Long-Term Med), pantoprazole (PROTONIX) EC tablet 20 mg [[id number]]        Significant 24hr Events     24hr events: episode of ICU delirium overnight, easily redirectable. No overnight acute issues. Overall feels well, has been able to tolerate PO intake.      Subjective     Review of Systems  Negative except where mentioned above.      Objective                            Vitals I/O      Most Recent Min/Max in 24hrs   Temp 98.1 °F (36.7 °C) Temp  Min: 97.7 °F (36.5 °C)  Max: 98.1 °F (36.7 °C)   Pulse 71 Pulse  Min: 62  Max: 116   Resp 21 Resp  Min: 14  Max: 37   /66 BP  Min: 91/51  Max: 176/92   O2 Sat 93 % SpO2  Min: 90 %  Max: 96 %      Intake/Output Summary (Last 24 hours) at 5/30/2024 0640  Last data filed at 5/29/2024 1254  Gross per 24 hour   Intake 217.92 ml   Output --   Net 217.92 ml       Diet Regular; Regular House    Invasive Monitoring           Physical Exam   Physical Exam  Eyes:      Pupils: Pupils are equal, round, and reactive to light.   Skin:     General: Skin is warm.   HENT:      Head: Normocephalic.      Mouth/Throat:      Mouth: Mucous membranes are moist.   Cardiovascular:      Rate and Rhythm: Normal rate.   Abdominal: General: There is no distension.      Palpations: Abdomen is soft.      Tenderness: There is no abdominal tenderness.      Comments: Groin access site c/d/I, no hematoma or overlying ecchymosis   Constitutional:       General: He is not in acute distress.     Appearance: He is well-developed.   Pulmonary:      Effort: Pulmonary effort is normal.   Neurological:      General: No focal deficit present.      Mental Status: He is alert and oriented to person, place and time. He is calm.             Diagnostic Studies      EKG: no new studies  Imaging: No new studies     Medications:  Scheduled PRN   acetaminophen, 975 mg, Q8H NAEL  aspirin, 81 mg, Daily  atorvastatin, 40 mg, Daily With Dinner  chlorhexidine, 15 mL, Q12H NAEL  flecainide, 50 mg, QAM  pantoprazole, 20 mg, Early Morning  senna-docusate sodium, 1 tablet, HS      HYDROmorphone, 0.2 mg, Q2H PRN  naloxone, 0.04 mg, Q1MIN PRN  ondansetron, 4 mg, Q4H PRN  oxyCODONE, 2.5 mg, Q4H PRN   Or  oxyCODONE, 5 mg, Q4H PRN       Continuous          Labs:    CBC    Recent Labs     05/29/24  0504 05/29/24  1115 05/30/24  0531   WBC 9.99  --  13.03*   HGB 10.7* 10.4* 10.3*   HCT 32.2* 32.4* 32.1*     --  212   BANDSPCT 3  --   --      BMP    Recent Labs     05/29/24  0504 05/30/24  0531   SODIUM 139 136   K 4.5 4.1    103   CO2 25 26   AGAP 6 7   BUN 19 21   CREATININE 1.14 0.97   CALCIUM 7.7* 8.1*       Coags    Recent Labs     05/29/24  0015 05/29/24  0504   INR 1.03 1.10   PTT 23 24        Additional Electrolytes  Recent Labs     05/29/24  0504 05/30/24  0531   MG 1.7* 2.3   PHOS 3.2  --           Blood Gas    No recent results  No recent results LFTs  Recent Labs     05/29/24  0242 05/29/24  0504   ALT 17 15   AST 15 14   ALKPHOS 41 41   ALB 3.9 3.6   TBILI 0.41 0.49       Infectious  No recent results  Glucose  Recent Labs     05/28/24  2250 05/29/24  0242 05/29/24  0504 05/30/24  0531   GLUC 146* 165* 149* 113             Sharad Davis MD

## 2024-05-31 ENCOUNTER — TRANSITIONAL CARE MANAGEMENT (OUTPATIENT)
Dept: FAMILY MEDICINE CLINIC | Facility: CLINIC | Age: 69
End: 2024-05-31

## 2024-05-31 ENCOUNTER — TELEPHONE (OUTPATIENT)
Age: 69
End: 2024-05-31

## 2024-05-31 NOTE — TELEPHONE ENCOUNTER
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 There was moderate to large amount of hemoperitoneum. He received volume resuscitation as well as DDAVP given daily aspirin usage. With these radiologic findings, the patient was transferred priority 1 status to [facility] for escalated vascular care.     Upon arrival at the [place] [facility] pt was taken urgently to the interventional radiology suite where he underwent mesenteric/visceral angiogram with embolization of the pancreaticoduodenal artery aneurysm with Embold soft microcoils.      Pt seeking follow up within 2 weeks with  however soonest available OV is 07/26. I informed pt I would request sooner virtual appointment with .

## 2024-05-31 NOTE — UTILIZATION REVIEW
NOTIFICATION OF ADMISSION DISCHARGE   This is a Notification of Discharge from [facility]. Please be advised that this patient has been discharge from our facility. Below you will find the admission and discharge date and time including the patient’s disposition.   UTILIZATION REVIEW CONTACT:  [place] Mast  Utilization   Network Utilization Review Department  Phone: [phone] x carefully listen to the prompts. All voicemails are confidential.  Email: [email or web address]     ADMISSION INFORMATION  PRESENTATION DATE: 5/29/2024  2:26 AM  OBERVATION ADMISSION DATE:   INPATIENT ADMISSION DATE: 5/29/24  4:33 AM   DISCHARGE DATE: 5/30/2024 12:07 PM   DISPOSITION:Home/Self Care    Network Utilization Review Department  ATTENTION: Please call with any questions or concerns to [phone] and carefully listen to the prompts so that you are directed to the right person. All voicemails are confidential.   For Discharge needs, contact Care Management DC Support Team at [phone] opt. 2  Send all requests for admission clinical reviews, approved or denied determinations and any other requests to dedicated fax number below belonging to the campus where the patient is receiving treatment. List of dedicated fax numbers for the Facilities:  FACILITY NAME UR FAX NUMBER   ADMISSION DENIALS (Administrative/Medical Necessity) [phone]   DISCHARGE SUPPORT TEAM ([facility]) [phone]   PARENT CHILD HEALTH (Maternity/NICU/Pediatrics) [phone]   [facility] [phone]   [facility] [phone]   [facility] [phone]   [facility] [phone]   [facility] [phone]   [facility] [phone]   [facility] [phone]   [facility] [phone]   [facility]  [facility] [phone]   [facility] [phone]   [facility] [phone]   [facility] [phone]

## 2024-05-31 NOTE — TELEPHONE ENCOUNTER
Pt called to schedule a hospital follow up appointment. Call was transferred to nurse to assist pt.

## 2024-06-03 ENCOUNTER — TELEPHONE (OUTPATIENT)
Dept: OTHER | Facility: HOSPITAL | Age: 69
End: 2024-06-03

## 2024-06-03 NOTE — TELEPHONE ENCOUNTER
I spoke with Dr. Arthur and he is aware of appointment and per his discussion with Dr. Byers, appointment will be in person not virtual.

## 2024-06-06 ENCOUNTER — HOSPITAL ENCOUNTER (OUTPATIENT)
Dept: CT IMAGING | Facility: HOSPITAL | Age: 69
Discharge: HOME | End: 2024-06-06
Attending: SURGERY
Payer: COMMERCIAL

## 2024-06-06 DIAGNOSIS — I72.8 PSEUDOANEURYSM OF INTRA-ABDOMINAL ARTERY (HCC): ICD-10-CM

## 2024-06-06 LAB
BASE EXCESS BLDA CALC-SCNC: -5 MMOL/L (ref -2–3)
BASE EXCESS BLDA CALC-SCNC: -6 MMOL/L (ref -2–3)
CA-I BLD-SCNC: 1.08 MMOL/L (ref 1.12–1.32)
CA-I BLD-SCNC: 1.11 MMOL/L (ref 1.12–1.32)
GLUCOSE SERPL-MCNC: 156 MG/DL (ref 65–140)
GLUCOSE SERPL-MCNC: 164 MG/DL (ref 65–140)
HCO3 BLDA-SCNC: 21.2 MMOL/L (ref 22–28)
HCO3 BLDA-SCNC: 23.9 MMOL/L (ref 22–28)
HCT VFR BLD CALC: 25 % (ref 36.5–49.3)
HCT VFR BLD CALC: 27 % (ref 36.5–49.3)
HGB BLDA-MCNC: 8.5 G/DL (ref 12–17)
HGB BLDA-MCNC: 9.2 G/DL (ref 12–17)
PCO2 BLD: 23 MMOL/L (ref 21–32)
PCO2 BLD: 26 MMOL/L (ref 21–32)
PCO2 BLD: 48.8 MM HG (ref 36–44)
PCO2 BLD: 66.6 MM HG (ref 36–44)
PH BLD: 7.16 [PH] (ref 7.35–7.45)
PH BLD: 7.25 [PH] (ref 7.35–7.45)
PO2 BLD: 222 MM HG (ref 75–129)
PO2 BLD: 340 MM HG (ref 75–129)
POTASSIUM BLD-SCNC: 3.5 MMOL/L (ref 3.5–5.3)
POTASSIUM BLD-SCNC: 4.6 MMOL/L (ref 3.5–5.3)
SAO2 % BLD FROM PO2: 100 % (ref 60–85)
SAO2 % BLD FROM PO2: 100 % (ref 60–85)
SODIUM BLD-SCNC: 139 MMOL/L (ref 136–145)
SODIUM BLD-SCNC: 139 MMOL/L (ref 136–145)
SPECIMEN SOURCE: ABNORMAL
SPECIMEN SOURCE: ABNORMAL

## 2024-06-06 PROCEDURE — 74174 CTA ABD&PLVS W/CONTRAST: CPT

## 2024-06-06 RX ADMIN — IOHEXOL 80 ML: 350 INJECTION, SOLUTION INTRAVENOUS at 14:43

## 2024-06-07 ENCOUNTER — APPOINTMENT (OUTPATIENT)
Dept: LAB | Facility: CLINIC | Age: 69
End: 2024-06-07
Payer: COMMERCIAL

## 2024-06-07 ENCOUNTER — TELEPHONE (OUTPATIENT)
Dept: UROLOGY | Facility: CLINIC | Age: 69
End: 2024-06-07

## 2024-06-07 ENCOUNTER — TELEMEDICINE (OUTPATIENT)
Dept: VASCULAR SURGERY | Facility: CLINIC | Age: 69
End: 2024-06-07
Payer: COMMERCIAL

## 2024-06-07 DIAGNOSIS — C61 MALIGNANT NEOPLASM OF PROSTATE (HCC): ICD-10-CM

## 2024-06-07 DIAGNOSIS — I72.8 PANCREATICODUODENAL ARTERY ANEURYSM (HCC): ICD-10-CM

## 2024-06-07 DIAGNOSIS — D62 ACUTE BLOOD LOSS ANEMIA (ABLA): ICD-10-CM

## 2024-06-07 DIAGNOSIS — I72.8 PSEUDOANEURYSM OF INTRA-ABDOMINAL ARTERY (HCC): Primary | ICD-10-CM

## 2024-06-07 DIAGNOSIS — I72.8 PSEUDOANEURYSM OF INTRA-ABDOMINAL ARTERY (HCC): ICD-10-CM

## 2024-06-07 DIAGNOSIS — C61 MALIGNANT NEOPLASM OF PROSTATE (HCC): Primary | ICD-10-CM

## 2024-06-07 LAB
AMYLASE SERPL-CCNC: 50 IU/L (ref 29–103)
ANION GAP SERPL CALCULATED.3IONS-SCNC: 9 MMOL/L (ref 4–13)
BASOPHILS # BLD AUTO: 0.03 THOUSANDS/ÂΜL (ref 0–0.1)
BASOPHILS NFR BLD AUTO: 1 % (ref 0–1)
BUN SERPL-MCNC: 17 MG/DL (ref 5–25)
CALCIUM SERPL-MCNC: 9.4 MG/DL (ref 8.4–10.2)
CHLORIDE SERPL-SCNC: 101 MMOL/L (ref 96–108)
CO2 SERPL-SCNC: 27 MMOL/L (ref 21–32)
CREAT SERPL-MCNC: 1.05 MG/DL (ref 0.6–1.3)
EOSINOPHIL # BLD AUTO: 0.08 THOUSAND/ÂΜL (ref 0–0.61)
EOSINOPHIL NFR BLD AUTO: 1 % (ref 0–6)
ERYTHROCYTE [DISTWIDTH] IN BLOOD BY AUTOMATED COUNT: 12.8 % (ref 11.6–15.1)
GFR SERPL CREATININE-BSD FRML MDRD: 71 ML/MIN/1.73SQ M
GLUCOSE SERPL-MCNC: 98 MG/DL (ref 65–140)
HCT VFR BLD AUTO: 36 % (ref 36.5–49.3)
HGB BLD-MCNC: 11.6 G/DL (ref 12–17)
IMM GRANULOCYTES # BLD AUTO: 0.06 THOUSAND/UL (ref 0–0.2)
IMM GRANULOCYTES NFR BLD AUTO: 1 % (ref 0–2)
LYMPHOCYTES # BLD AUTO: 1.13 THOUSANDS/ÂΜL (ref 0.6–4.47)
LYMPHOCYTES NFR BLD AUTO: 18 % (ref 14–44)
MCH RBC QN AUTO: 29.7 PG (ref 26.8–34.3)
MCHC RBC AUTO-ENTMCNC: 32.2 G/DL (ref 31.4–37.4)
MCV RBC AUTO: 92 FL (ref 82–98)
MONOCYTES # BLD AUTO: 0.67 THOUSAND/ÂΜL (ref 0.17–1.22)
MONOCYTES NFR BLD AUTO: 10 % (ref 4–12)
NEUTROPHILS # BLD AUTO: 4.46 THOUSANDS/ÂΜL (ref 1.85–7.62)
NEUTS SEG NFR BLD AUTO: 69 % (ref 43–75)
NRBC BLD AUTO-RTO: 0 /100 WBCS
PLATELET # BLD AUTO: 302 THOUSANDS/UL (ref 149–390)
PMV BLD AUTO: 9 FL (ref 8.9–12.7)
POTASSIUM SERPL-SCNC: 3.7 MMOL/L (ref 3.5–5.3)
PSA SERPL-MCNC: <0.01 NG/ML (ref 0–4)
RBC # BLD AUTO: 3.91 MILLION/UL (ref 3.88–5.62)
SODIUM SERPL-SCNC: 137 MMOL/L (ref 135–147)
WBC # BLD AUTO: 6.43 THOUSAND/UL (ref 4.31–10.16)

## 2024-06-07 PROCEDURE — 85025 COMPLETE CBC W/AUTO DIFF WBC: CPT

## 2024-06-07 PROCEDURE — 99214 OFFICE O/P EST MOD 30 MIN: CPT | Performed by: SURGERY

## 2024-06-07 PROCEDURE — 80048 BASIC METABOLIC PNL TOTAL CA: CPT

## 2024-06-07 PROCEDURE — 82150 ASSAY OF AMYLASE: CPT

## 2024-06-07 PROCEDURE — 36415 COLL VENOUS BLD VENIPUNCTURE: CPT

## 2024-06-07 PROCEDURE — 84153 ASSAY OF PSA TOTAL: CPT

## 2024-06-07 NOTE — TELEPHONE ENCOUNTER
She is known to me with history of prostate cancer treated with robotic prostatectomy in 2007.  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  This was not present on the patient's prior CT in 2020    I spoke with the patient by phone.  I have ordered a repeat PSA.  I will follow-up on the results and notify the patient.  He is aware that I am traveling out of the region next week.

## 2024-06-07 NOTE — ASSESSMENT & PLAN NOTE
69 y/o with prior h/o Lap Cholecystectomy who presented with sudden onset abdominal pain and diaphoresis and was found to have a ruptured pseudoaneurysm of the pancreaticoduodenal artery.  This was treated with emergency angiogram / coil embolization by Dr. Moncada (IR).  Patient had significant hemoperitoneum and acute blood loss anemia.    I have personally reviewed the CT imaging and discussed with Radiologist Dr. Olguin, and my independent interpretation is as follows:  Large hemoperitoneum, irregularity in the pancreaticoduodenal artery on initial CTA.  There are no masses in the head of pancreas, evaluation is limited due to blood in that area.  Repeat CTA done yesterday shows improvement in the hemoperitoneum, no extravasation, coils are in good position.    We had a detailed discussion of what could be the etiology of this condition.    Maybe a bout of subclinical pancreatitis in remote past  Maybe related to median arcuate ligament compression and stenosis of the celiac artery that could have increased compensatory flow in the pancreaticoduodenal artery.  There are no symptoms / significant stenosis in celiac artery to indicate need for invasive interventions such as celiac artery stenting / bypass.    We also discussed prior EGD results - March 2021, did not show any significant pathology.    In order to completely rule out any pancreatic pathology as a cause of this, we can consider MRI pancreas, but due to presence of vascular coils, evaluation could be limited.  So an Endoscopic ultrasound could be considered for definitive evaluation of the head of pancreas.    He is recovering but does have tiredness.  First week after procedure he had abdominal pain which has now improved.  Still has some constipation.    He can return to work with limitations of no heavy lifting over 20 lbs for next 8 weeks.  I have advised him against playing golf or performing any strenuous activities for next 6-8 weeks.    There  was a concerning finding around site or prior prostatectomy, but his PSA is <0.01 so unlikely that there is any recurrence.    We will arrange for a 3 month follow up CTA to check for coil position and any other changes.

## 2024-06-07 NOTE — ASSESSMENT & PLAN NOTE
Due to intraabdominal hemorrhage from ruptured pseudoaneurysm of the pancreatico duodenal artery.  Hemoglobin improved from 10.4 to 11.4  Received 1 U PRBC when he was in hospital.    Blood work was reviewed.  Over time hemoglobin level should normalize.

## 2024-06-07 NOTE — LETTER
June 7, 2024     Patient: Alex Arthur  YOB: 1955  Date of Visit: 6/7/2024      To Whom it May Concern:    Alex Arthur is under my professional care. Alex was seen in my office on 6/7/2024. Alex may return to work with limitations no lifting over 20 lbs for next 8 weeks .    If you have any questions or concerns, please don't hesitate to call.         Sincerely,          Jesusita Ashley MD        CC: No Recipients

## 2024-06-07 NOTE — PROGRESS NOTES
Virtual Brief Visit    This Visit is being completed by telephone. The Patient is located at Home and in the following state in which I hold an active license [place]    The patient was identified by name and date of birth. Alex Arthur was informed that this is a telemedicine visit and that the visit is being conducted through Telephone.  My office door was closed. No one else was in the room.  He acknowledged consent and understanding of privacy and security of the video platform. The patient has agreed to participate and understands they can discontinue the visit at any time.    Patient is aware this is a billable service.       Assessment/Plan:    Problem List Items Addressed This Visit        Cardiovascular and Mediastinum    Pseudoaneurysm of intra-abdominal artery (HCC) - Primary     69 y/o with prior h/o Lap Cholecystectomy who presented with sudden onset abdominal pain and diaphoresis and was found to have a ruptured pseudoaneurysm of the pancreaticoduodenal artery.  This was treated with emergency angiogram / coil embolization by Dr. Moncada (IR).  Patient had significant hemoperitoneum and acute blood loss anemia.    I have personally reviewed the CT imaging and discussed with Radiologist Dr. Olguin, and my independent interpretation is as follows:  Large hemoperitoneum, irregularity in the pancreaticoduodenal artery on initial CTA.  There are no masses in the head of pancreas, evaluation is limited due to blood in that area.  Repeat CTA done yesterday shows improvement in the hemoperitoneum, no extravasation, coils are in good position.    We had a detailed discussion of what could be the etiology of this condition.    Maybe a bout of subclinical pancreatitis in remote past  Maybe related to median arcuate ligament compression and stenosis of the celiac artery that could have increased compensatory flow in the pancreaticoduodenal artery.  There are no symptoms / significant stenosis in celiac artery to  indicate need for invasive interventions such as celiac artery stenting / bypass.    We also discussed prior EGD results - March 2021, did not show any significant pathology.    In order to completely rule out any pancreatic pathology as a cause of this, we can consider MRI pancreas, but due to presence of vascular coils, evaluation could be limited.  So an Endoscopic ultrasound could be considered for definitive evaluation of the head of pancreas.    He is recovering but does have tiredness.  First week after procedure he had abdominal pain which has now improved.  Still has some constipation.    He can return to work with limitations of no heavy lifting over 20 lbs for next 8 weeks.  I have advised him against playing golf or performing any strenuous activities for next 6-8 weeks.    There was a concerning finding around site or prior prostatectomy, but his PSA is <0.01 so unlikely that there is any recurrence.    We will arrange for a 3 month follow up CTA to check for coil position and any other changes.             Relevant Orders    CTA abdomen pelvis w wo contrast    CBC and Platelet    Basic metabolic panel       Blood    Acute blood loss anemia (ABLA)     Due to intraabdominal hemorrhage from ruptured pseudoaneurysm of the pancreatico duodenal artery.  Hemoglobin improved from 10.4 to 11.4  Received 1 U PRBC when he was in hospital.    Blood work was reviewed.  Over time hemoglobin level should normalize.         Relevant Orders    CTA abdomen pelvis w wo contrast    CBC and Platelet    Basic metabolic panel       Recent Visits  Date Type Provider Dept   06/06/24 Telephone Jesusita Ashley MD Surgery Physician   Showing recent visits within past 7 days and meeting all other requirements  Today's Visits  Date Type Provider Dept   06/07/24 Telemedicine Jesusita Ashley MD [facility]   Showing today's visits and meeting all other requirements  Future Appointments  No visits were found meeting  these conditions.  Showing future appointments within next 150 days and meeting all other requirements         Visit Time  Total Visit Duration: 30 min

## 2024-06-11 ENCOUNTER — OFFICE VISIT (OUTPATIENT)
Dept: GASTROENTEROLOGY | Facility: CLINIC | Age: 69
End: 2024-06-11
Payer: COMMERCIAL

## 2024-06-11 VITALS
BODY MASS INDEX: 26.81 KG/M2 | HEIGHT: 69 IN | SYSTOLIC BLOOD PRESSURE: 112 MMHG | TEMPERATURE: 98.5 F | DIASTOLIC BLOOD PRESSURE: 78 MMHG | WEIGHT: 181 LBS

## 2024-06-11 DIAGNOSIS — K30 FUNCTIONAL DYSPEPSIA: ICD-10-CM

## 2024-06-11 DIAGNOSIS — Z87.19 HISTORY OF BARRETT'S ESOPHAGUS: ICD-10-CM

## 2024-06-11 DIAGNOSIS — I72.8 PSEUDOANEURYSM OF INTRA-ABDOMINAL ARTERY (HCC): Primary | ICD-10-CM

## 2024-06-11 DIAGNOSIS — Z86.010 HISTORY OF COLON POLYPS: ICD-10-CM

## 2024-06-11 DIAGNOSIS — Z80.0 FAMILY HISTORY OF COLON CANCER: ICD-10-CM

## 2024-06-11 DIAGNOSIS — E78.2 MIXED HYPERLIPIDEMIA: ICD-10-CM

## 2024-06-11 DIAGNOSIS — R11.0 NAUSEA: ICD-10-CM

## 2024-06-11 DIAGNOSIS — R14.2 BURPING: ICD-10-CM

## 2024-06-11 PROCEDURE — 99214 OFFICE O/P EST MOD 30 MIN: CPT | Performed by: INTERNAL MEDICINE

## 2024-06-11 NOTE — PROGRESS NOTES
[facility] - Outpatient Follow-up Note  Alex Arthur 68 y.o. male MRN: [id number]  Encounter: [id number]          ASSESSMENT AND PLAN:      1. Pseudoaneurysm of intra-abdominal artery (HCC)  2. Functional dyspepsia  3. Burping  4. Nausea  5. History of Warren's esophagus  He has a history of Warren's esophagus and symptoms of functional dyspepsia and mild reflux and these are likely due to a small hiatal hernia.  There is no clear evidence of pancreatic disease based on his imaging and history but given this episode of hemoperitoneum from a blood vessel near his pancreas, he should have further imaging of his pancreas.  This can either be done with MRI or endoscopic ultrasound.  The coils in close proximity to his pancreas may affect the quality of the imaging on MRI so we will schedule him for an endoscopic ultrasound in approximately 2 to 3 months.  This will be an opportunity to get a close evaluation of the pancreas.  - Endoscopic ultrasonography, GI (Upper) Linear; Future    6. Family history of colon cancer  7. History of colon polyps  He has a history of colon polyps and a family history of colon cancer and is due for his next surveillance colonoscopy in 2026.    ______________________________________________________________________    SUBJECTIVE: He presents for evaluation after recent medical emergency.  He had ruptured pseudoaneurysm of an intra-abdominal artery complicated by hemoperitoneum.  This was successfully managed with an IR procedure and coiling of the vessel.  He has recovered well since that procedure.  There have been no evidence of pancreatic abnormalities and he has no history of pancreatitis or other pancreatic issues and the etiology of this episode has been unclear.  He currently has mild abdominal discomfort and some nausea and occasional burping but denies reflux, difficulty swallowing, change in bowel habits, bleeding, or weight loss.    He has mild  constipation for which she has been taking Metamucil.    His last upper endoscopy and colonoscopy were in 2021 and they were notable for a small sliding hiatal hernia, grade a erosive reflux esophagitis, hemorrhoids, but no evidence of Warren's esophagus or colon polyps.      REVIEW OF SYSTEMS IS OTHERWISE NEGATIVE.      Historical Information   Past Medical History:   Diagnosis Date    Acute blood loss anemia (ABLA) 06/07/2024    Colon polyp     GERD (gastroesophageal reflux disease)     Hypercholesteremia     Irregular heart beat     PAC    Lumbar radiculitis     OA (osteoarthritis)     Prostate CA (HCC)     Pyriformis syndrome     Shoulder impingement, left      Past Surgical History:   Procedure Laterality Date    CHOLECYSTECTOMY      COLONOSCOPY      EGD      EGD AND COLONOSCOPY      IR MESENTERIC/VISCERAL ANGIOGRAM  5/29/2024    KNEE ARTHROSCOPY Bilateral     KNEE SURGERY Left     benign tumor removed     IA LAPAROSCOPY SURG CHOLECYSTECTOMY N/A 10/9/2020    Procedure: LAPAROSCOPIC CHOLECYSTECTOMY, umbilical hernia repair;  Surgeon: Yo Leyva MD;  Location: [facility];  Service: General    PROSTATE SURGERY      2007     Social History   Social History     Substance and Sexual Activity   Alcohol Use Yes    Alcohol/week: 1.0 standard drink of alcohol    Types: 1 Glasses of wine per week     Social History     Substance and Sexual Activity   Drug Use Never     Social History     Tobacco Use   Smoking Status Never   Smokeless Tobacco Never     Family History   Problem Relation Age of Onset    Lymphoma Mother     Lung cancer Father     Heart disease Brother     Other Brother         multisystem atrophy    Colon cancer Maternal Grandmother        Meds/Allergies       Current Outpatient Medications:     acetaminophen (TYLENOL) 325 mg tablet    Cholecalciferol (Dialyvite Vitamin D 5000) 125 MCG (5000 UT) capsule    flecainide (TAMBOCOR) 50 mg tablet    omeprazole (PriLOSEC) 20 mg delayed release capsule    oxazepam  "(SERAX) 10 mg capsule    psyllium (METAMUCIL) packet    rosuvastatin (CRESTOR) 10 MG tablet    senna-docusate sodium (SENOKOT S) 8.6-50 mg per tablet    sildenafil (VIAGRA) 100 mg tablet    aspirin 81 mg chewable tablet    tobramycin-dexamethasone (TOBRADEX) ophthalmic suspension    No Known Allergies        Objective     Blood pressure 112/78, temperature 98.5 °F (36.9 °C), temperature source Tympanic, height 5' 9\" (1.753 m), weight 82.1 kg (181 lb). Body mass index is 26.73 kg/m².      PHYSICAL EXAM:      General Appearance:   Alert, cooperative, no distress   HEENT:   Normocephalic, atraumatic, anicteric.     Neck:  Supple, symmetrical, trachea midline   Lungs:   Clear to auscultation bilaterally; no rales, rhonchi or wheezing; respirations unlabored    Heart::   Regular rate and rhythm; no murmur, rub, or gallop.   Abdomen:   Soft, mild periumbilical and suprapubic tenderness, non-distended; normal bowel sounds; no masses, no organomegaly    Genitalia:   Deferred    Rectal:   Deferred    Extremities:  No cyanosis, clubbing or edema    Pulses:  2+ and symmetric    Skin:  No jaundice, rashes, or lesions    Lymph nodes:  No palpable cervical lymphadenopathy        Lab Results:   No visits with results within 1 Day(s) from this visit.   Latest known visit with results is:   Appointment on 06/07/2024   Component Date Value    PSA, Diagnostic 06/07/2024 <0.01     WBC 06/07/2024 6.43     RBC 06/07/2024 3.91     Hemoglobin 06/07/2024 11.6 (L)     Hematocrit 06/07/2024 36.0 (L)     MCV 06/07/2024 92     MCH 06/07/2024 29.7     MCHC 06/07/2024 32.2     RDW 06/07/2024 12.8     MPV 06/07/2024 9.0     Platelets 06/07/2024 302     nRBC 06/07/2024 0     Segmented % 06/07/2024 69     Immature Grans % 06/07/2024 1     Lymphocytes % 06/07/2024 18     Monocytes % 06/07/2024 10     Eosinophils Relative 06/07/2024 1     Basophils Relative 06/07/2024 1     Absolute Neutrophils 06/07/2024 4.46     Absolute Immature Grans 06/07/2024 " 0.06     Absolute Lymphocytes 06/07/2024 1.13     Absolute Monocytes 06/07/2024 0.67     Eosinophils Absolute 06/07/2024 0.08     Basophils Absolute 06/07/2024 0.03     Amylase 06/07/2024 50     Sodium 06/07/2024 137     Potassium 06/07/2024 3.7     Chloride 06/07/2024 101     CO2 06/07/2024 27     ANION GAP 06/07/2024 9     BUN 06/07/2024 17     Creatinine 06/07/2024 1.05     Glucose 06/07/2024 98     Calcium 06/07/2024 9.4     eGFR 06/07/2024 71          Radiology Results:   CTA abdomen pelvis w wo contrast    Result Date: 6/6/2024  Narrative: CT ANGIOGRAM OF THE ABDOMEN AND PELVIS WITH AND WITHOUT IV CONTRAST INDICATION: I72.8: Aneurysm of other specified arteries. 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 COMPARISON: CT, dated 5/28/2024. Fluoroscopy, dated 5/29/2024. TECHNIQUE: CT angiogram examination of the abdomen and pelvis was performed according to standard protocol. This examination, like all CT scans performed in the [facility], was performed utilizing techniques to minimize radiation dose exposure, including the use of iterative reconstruction and automated exposure control. Contrast as well as noncontrast images were obtained. Rad dose 819.99 mGy-cm IV Contrast: 80 mL of iohexol (OMNIPAQUE) Enteric Contrast: Not administered. FINDINGS: VASCULAR STRUCTURES: ABDOMINAL VASCULAR STRUCTURES: AORTA: The abdominal aorta is normal in caliber.  There are mild atherosclerotic calcifications. CELIAC ARTERY: Redemonstrated is mild noncalcific narrowing at the ostia of the celiac artery with mild poststenotic dilatation. SUPERIOR MESENTERIC ARTERY: Redemonstrated is a replaced right hepatic artery arising from the superior mesenteric artery, normal variant. Redemonstrated are postprocedural changes related to coil embolization of the prior inferior pancreaticoduodenal arcade aneurysm. No additional aneurysm or abnormal irregularity. No evidence of active  bleeding. INFERIOR MESENTERIC ARTERY: The ostia and visualized branches are normal. RIGHT RENAL ARTERY: The single renal artery is normal in caliber. LEFT RENAL ARTERY: The single renal artery is normal in caliber. LEFT LOWER EXTREMITY: LEFT COMMON ILIAC ARTERY: Normal in caliber, without atherosclerotic disease. LEFT INTERNAL ILIAC ARTERY: Mild atherosclerotic disease.  Visualized branches are normal in caliber. LEFT EXTERNAL ILIAC ARTERY: Normal in caliber, without atherosclerotic disease. LEFT COMMON FEMORAL ARTERY: Normal in caliber, without atherosclerotic disease. RIGHT LOWER EXTREMITY: RIGHT COMMON ILIAC ARTERY: Normal in caliber, without atherosclerotic disease. RIGHT INTERNAL ILIAC ARTERY: Mild atherosclerotic disease.  Visualized branches are normal in caliber. RIGHT EXTERNAL ILIAC ARTERY: Normal in caliber, without atherosclerotic disease. RIGHT COMMON FEMORAL ARTERY: Normal in caliber, without atherosclerotic disease. VENOUS: Iliocaval system is normal in caliber and normal in opacification. OTHER FINDINGS ABDOMEN LOWER CHEST: Few punctate benign calcified left lower lobe granulomas. Mild bibasilar atelectasis. LIVER/BILIARY TREE: Unremarkable. GALLBLADDER: Post cholecystectomy. SPLEEN: Unremarkable. PANCREAS: Unremarkable. ADRENAL GLANDS: Unremarkable. KIDNEYS/URETERS: Unremarkable. No hydronephrosis. STOMACH AND BOWEL: Unremarkable. APPENDIX: No findings to suggest appendicitis. ABDOMINOPELVIC CAVITY: Redemonstrated is the evolving hematoma involving the root of the mesentery, measuring approximately 11.6 x 3.2 cm (series 301, image 87), previously 12.9 x 4.4 cm. Small amount of hematoma layers dependently in the pelvic cul-de-sac, again decreased in volume from the prior study. No enlarged lymph nodes. PELVIS REPRODUCTIVE ORGANS: Postsurgical changes of prostatectomy. Redemonstrated is an ovoid area of hypoattenuation in the expected region of the right seminal vesicle, measuring 24 x 22 mm (series  301, image 163), stable from the prior study on May 28, new from a distant comparison in September 2020. No enhancement or area of nodularity. URINARY BLADDER: Unremarkable. ABDOMINAL WALL/INGUINAL REGIONS: Right fat-containing inguinal hernia. BONES: No acute fracture or suspicious osseous lesion. Spinal degenerative changes.     Impression: 1.  Expected postprocedural changes related to inferior pancreaticoduodenal arcade aneurysm embolization. No additional areas of aneurysm or irregularity remain. 2.  Expected reduction in volume and redistribution of intraperitoneal hemorrhage. 3.  Redemonstrated 2 cm ovoid area of hypoattenuation in the expected region of the right seminal vesicle/prostatectomy surgical field, new from a CT in 2020. No contrast enhancement or nodularity. Imaging appearance suggests a chronic lymphocele but consultation with urology is recommended. 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 Contrast: 68 mL of iodixanol (VISIPAQUE) Fluoro time: 13.8 mins Number of Images: Multiple Radiation dose: 1416 mGy Conscious sedation time: anesthesia Technique: The patient was brought to the interventional radiology suite and identified verbally and by wrist band. The patient was placed supine on the table and the right groin was prepped and draped in the usual sterile fashion. The right common femoral artery was evaluated as a potential access site with ultrasound. The vessel was found to be patent and compressible with pulsatile flow. Lidocaine was administered to the skin and a small skin incision was made. Under ultrasound guidance, utilizing sterile ultrasound technique with sterile gel and a sterile probe cover, the right common femoral artery was accessed using single wall Seldinger technique. Static images of real time needle entry into the  vessel were obtained. A Ad[employer]One [employer] wire was advanced into the abdominal aorta over which a 6 [place] vascular sheath was inserted and connected to a flush bag.  A 5 [place] Cobra catheter was advanced selectively into the superior mesenteric artery, and subselective arteriography was performed in the [place]ian projection. Findings: The superior mesenteric artery is overall normal in appearance. A single pancreaticoduodenal branch is noted to be diffusely and spasm with a focal area of aneurysmal dilatation. Additional arteriography and intervention: Through the 5 [place] Cobra catheter, a Progreat microcatheter was advanced into the abnormal vessel, and selective arteriography was performed which demonstrated the branch to be in diffuse spasm with another focal area of dilatation less than 1 cm distance from the larger focal area of dilatation. The remainder of the distal vessel beyond the spasm is normal in appearance, and collateral inflow with washout is noted. The microcatheter was advanced to the distal end of the abnormal segment, and the segment was then embolized to stasis with Embold soft detachable microcoils. Follow-up arteriography demonstrated no flow through the abnormal segment, with some resolution  of the more proximal spasm noted. At the end of the procedure, a Proglide was used for hemostasis.     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 The area was successfully superselectively embolized with microcoils as described. Workstation performed: [id number]     XR chest 1 view portable    Result Date: 5/29/2024  Narrative: XR CHEST PORTABLE INDICATION: Chest pain. COMPARISON: 3/19/2022 FINDINGS: Clear lungs. No pneumothorax or pleural effusion. Normal cardiomediastinal silhouette. Bones are unremarkable for age. Normal upper abdomen.     Impression: No acute cardiopulmonary disease. Workstation performed: [id number]      CTA dissection protocol chest/abdomen/pelvis    Result Date: 5/29/2024  Narrative: CTA - CHEST, ABDOMEN AND PELVIS - WITHOUT AND WITH IV CONTRAST INDICATION: abdominal pain, radiating into chest. COMPARISON: CT abdomen/pelvis on 9/25/2020. TECHNIQUE: CT examination of the chest, abdomen and pelvis was performed both prior to and after the administration of intravenous contrast. The noncontrast portion of this examination was performed utilizing low radiation dose technique.  Thin section angiographic arterial phase post contrast technique was used in order to evaluate for aortic dissection. 3D reformatted images and volume rendering were performed on an independent workstation. Multiplanar 2D reformatted images were created from the source data. Radiation dose length product (DLP) for this visit: 1310.05 mGy-cm . This examination, like all CT scans performed in the [facility], was performed utilizing techniques to minimize radiation dose exposure, including the use of iterative reconstruction and automated exposure control. IV Contrast: 100 mL of iohexol (OMNIPAQUE) Enteric Contrast: Not administered. FINDINGS: AORTA: No aortic dissection or intramural hematoma. No aortic aneurysm. Mild scattered calcific atherosclerosis without significant flow limiting atherosclerotic stenosis of aorta or major aortic branch vessel in the chest, abdomen or pelvis. The thoracic great vessels are patent with normal course and caliber. Moderate (50%) luminal narrowing of the celiac artery near the origin with slight poststenotic dilatation. The left gastric, left hepatic, and splenic arteries arises from the celiac trunk. The gastroduodenal artery arising from the left hepatic artery gives rise  to the posterior superior pancreaticoduodenal artery. There is a 3.5 x 4.6 mm focal aneurysm/pseudoaneurysm arising from the posterior superior pancreaticoduodenal artery with a large amount of surrounding hematoma noted  measuring up to 13.1 x 4.4 cm. This is likely the source of the hematoma. This hematoma surrounds the transverse duodenum and the pancreatic head/uncinate process. There is also associated moderate to large amount of hemoperitoneum surrounding the liver, spleen, bilateral paracolic gutters, and lower dependent pelvis. CHEST LUNGS: Central airways are patent. No tracheal or endobronchial lesion. No lobar airspace consolidation/pneumonia. No suspicious pulmonary parenchymal mass/nodules. PLEURA: Unremarkable without pneumothorax or pleural effusions. HEART/PULMONARY ARTERIAL TREE: Heart is unremarkable for patient's age. No pericardial effusion. No evidence for acute pulmonary embolism. MEDIASTINUM AND PRASHANT: No mediastinal mass or lymphadenopathy by size criteria. Thyroid glands appear grossly unremarkable. Esophagus is normal in course and caliber. No significant prevertebral soft tissue swelling.. CHEST WALL AND LOWER NECK: Unremarkable. ABDOMEN LIVER/BILIARY TREE: Unremarkable. GALLBLADDER: Post cholecystectomy. SPLEEN: Heterogeneous attenuation likely related to phase of contrast enhancement limiting evaluation.. PANCREAS: Large amount of hematoma surrounding the pancreatic head and uncinate process limiting evaluation. The remaining pancreatic parenchyma appear grossly unremarkable. ADRENAL GLANDS: Unremarkable. KIDNEYS/URETERS: Unremarkable. No hydronephrosis. STOMACH AND BOWEL: Stomach is largely contracted limiting evaluation. Small and large bowel loops appear normal in course and caliber without obstruction or inflammation noted. Terminal ileum appear grossly unremarkable. Appendix is not definitively identified and obscured by blood products in the right paracolic gutter.. APPENDIX: No findings to suggest appendicitis. ABDOMINOPELVIC CAVITY: No pneumoperitoneum. No lymphadenopathy. Moderate to large amount of hyperdense fluid and solid-appearing material throughout the abdomen and pelvis noted consistent  with blood products. PELVIS REPRODUCTIVE ORGANS: Postsurgical changes of prostatectomy noted. A 2.1 x 2.5 cm ovoid cystic structure in the right seminal vesicle region noted. A few punctate pelvic phleboliths are seen.. URINARY BLADDER: Contracted limiting evaluation without gross intraluminal mass or calculi.. ABDOMINAL WALL/INGUINAL REGIONS: Small fat-containing right inguinal hernia.. BONES: No acute fracture or suspicious osseous lesion. Mild multilevel degenerative changes of the thoracolumbar spine.     Impression: 1.  No thoracoabdominal aortic aneurysm or dissection. Mild scattered calcific atherosclerosis. 2.  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 Moderate to large amount of hemoperitoneum throughout the perihepatic, perisplenic, bilateral paracolic gutters, and dependent pelvis noted. Source of the large hematoma and hemoperitoneum is likely due to this small aneurysm/pseudoaneurysm. 3.  No acute intrathoracic abnormalities. No bowel obstruction, inflammation, obstructive uropathy, or free air. Findings discussed with Dr. Saenz at 12:47 a.m. Workstation performed: [id number]   Answers submitted by the patient for this visit:  Abdominal Pain Questionnaire (Submitted on 6/5/2024)  Chief Complaint: Abdominal pain  Chronicity: new  Onset: 1 to 4 weeks ago  Onset quality: sudden  Frequency: rarely  Episode duration: 0 Hours  Progression since onset: rapidly improving  Pain location: epigastric region  Pain - numeric: 1/10  Pain quality: aching  Radiates to: does not radiate  anorexia: Yes  arthralgias: No  belching: Yes  constipation: Yes  diarrhea: Yes  dysuria: No  fever: No  flatus: Yes  frequency: Yes  headaches: No  hematochezia: No  hematuria: No  melena: No  myalgias: No  nausea: Yes  weight loss: Yes  vomiting: No  Aggravated by: nothing  Relieved by:  nothing  Diagnostic workup: CT scan

## 2024-06-12 RX ORDER — ROSUVASTATIN CALCIUM 10 MG/1
10 TABLET, COATED ORAL DAILY
Qty: 90 TABLET | Refills: 1 | Status: SHIPPED | OUTPATIENT
Start: 2024-06-12

## 2024-06-13 ENCOUNTER — TELEPHONE (OUTPATIENT)
Dept: GASTROENTEROLOGY | Facility: CLINIC | Age: 69
End: 2024-06-13

## 2024-06-13 NOTE — TELEPHONE ENCOUNTER
----- Message from Abby SPENCER sent at 6/12/2024  1:43 PM EDT -----  Regarding: FW: EUS    ----- Message -----  From: Nishi Starkey PA-C  Sent: 6/12/2024  12:52 PM EDT  To: [facility]  Subject: FW: EUS                                          Ready to schedule! TY  ----- Message -----  From: Becca Purcell  Sent: 6/11/2024  12:54 PM EDT  To: [facility]  Subject: EUS                                              Dr. Byers has ordered an EUS.  Per Dr. Byers okay to be scheduled with Dr. Dumas, Dr. Davenport, or Dr. Byers himself.  Thank you.

## 2024-06-13 NOTE — TELEPHONE ENCOUNTER
Procedure:  EUS  Scheduled date of procedure (as of today): 9/5/24  Physician performing procedure: Dr. Byers  Location of procedure: [place]   Instructions reviewed with patient by:  Abby dalton   Clearances: n/a

## 2024-07-31 ENCOUNTER — OFFICE VISIT (OUTPATIENT)
Dept: OBGYN CLINIC | Facility: HOSPITAL | Age: 69
End: 2024-07-31
Payer: COMMERCIAL

## 2024-07-31 VITALS
HEIGHT: 69 IN | BODY MASS INDEX: 26.94 KG/M2 | HEART RATE: 56 BPM | DIASTOLIC BLOOD PRESSURE: 72 MMHG | WEIGHT: 181.9 LBS | SYSTOLIC BLOOD PRESSURE: 116 MMHG

## 2024-07-31 DIAGNOSIS — M65.332 TRIGGER MIDDLE FINGER OF LEFT HAND: Primary | ICD-10-CM

## 2024-07-31 DIAGNOSIS — M65.331 TRIGGER MIDDLE FINGER OF RIGHT HAND: ICD-10-CM

## 2024-07-31 PROCEDURE — 20550 NJX 1 TENDON SHEATH/LIGAMENT: CPT | Performed by: ORTHOPAEDIC SURGERY

## 2024-07-31 PROCEDURE — 1160F RVW MEDS BY RX/DR IN RCRD: CPT | Performed by: ORTHOPAEDIC SURGERY

## 2024-07-31 PROCEDURE — 99214 OFFICE O/P EST MOD 30 MIN: CPT | Performed by: ORTHOPAEDIC SURGERY

## 2024-07-31 PROCEDURE — 1159F MED LIST DOCD IN RCRD: CPT | Performed by: ORTHOPAEDIC SURGERY

## 2024-07-31 RX ORDER — BETAMETHASONE SODIUM PHOSPHATE AND BETAMETHASONE ACETATE 3; 3 MG/ML; MG/ML
6 INJECTION, SUSPENSION INTRA-ARTICULAR; INTRALESIONAL; INTRAMUSCULAR; SOFT TISSUE
Status: COMPLETED | OUTPATIENT
Start: 2024-07-31 | End: 2024-07-31

## 2024-07-31 RX ORDER — ROPIVACAINE HYDROCHLORIDE 5 MG/ML
1 INJECTION, SOLUTION EPIDURAL; INFILTRATION; PERINEURAL
Status: COMPLETED | OUTPATIENT
Start: 2024-07-31 | End: 2024-07-31

## 2024-07-31 RX ORDER — LIDOCAINE HYDROCHLORIDE AND EPINEPHRINE 10; 10 MG/ML; UG/ML
20 INJECTION, SOLUTION INFILTRATION; PERINEURAL ONCE
OUTPATIENT
Start: 2024-07-31 | End: 2024-07-31

## 2024-07-31 RX ADMIN — BETAMETHASONE SODIUM PHOSPHATE AND BETAMETHASONE ACETATE 6 MG: 3; 3 INJECTION, SUSPENSION INTRA-ARTICULAR; INTRALESIONAL; INTRAMUSCULAR; SOFT TISSUE at 08:15

## 2024-07-31 RX ADMIN — ROPIVACAINE HYDROCHLORIDE 1 ML: 5 INJECTION, SOLUTION EPIDURAL; INFILTRATION; PERINEURAL at 08:15

## 2024-07-31 NOTE — PROGRESS NOTES
ASSESSMENT/PLAN:    Assessment:   Right long finger trigger finger (1st injection provided today)  Left long finger trigger finger (3rd injection provided today, no additional injections indicated)    Plan:   steroid injections and Trigger Finger Release  left long finger trigger finger release followed by staged right long finger trigger finger release    Follow Up:  After Surgery    To Do Next Visit:    and Sutures out    General Discussions:      Operative Discussions:  Trigger Finger Release: The anatomy and physiology of trigger finger was discussed with the patient today in the office.  Edema and increased contact pressure within the flexor tendons at the A1 pulley can cause pain, crepitation, and limitation of function.  Treatment options include resting MP blocking splints to decrease edema, oral anti-inflammatory medications, home or formal therapy exercises, up to 2 steroid injections or surgical release.  While majority of patients do respond to conservative treatment, up to 20% may require surgical release.  The patient has elected release of the trigger finger.  The patient has elected to undergo a release of the A1 pulley (trigger finger).  A small incision will be made over the palmar aspect of the hand, the tendon sheath holding the flexor tendons will be released.  In the postoperative period, light activities are allowed immediately, driving is allowed when narcotic medication has stopped, and the incision may get wet after 2 days.  Heavy activities (lifting more than approximately 10 pounds) will be allowed after the follow up appointment in 1-2 weeks.  While the pain and discomfort within the wrist typically improves rapidly, some residual discomfort may be present for up to 6 weeks.  The nodule that is typically palpable in the palmar aspect of the hand will not be removed, as this would necessitate removal of a portion of the flexor tendon, however the catching, clicking, and locking should  resolve.  Approximate success rate is 98%.The risks and benefits of the procedure were explained to the patient, which include, but are not limited to: Bleeding, infection, recurrence, pain, scar, damage to tendons, damage to nerves, and damage to blood vessels, need for future surgery and complications related to anesthesia.  If bony work is done, risks also include malunion and nonunion.  These risks, along with alternative conservative treatment options, and postoperative protocols were voiced back and understood by the patient.  All questions were answered to the patient's satisfaction.  The patient agrees to comply with a standard postoperative protocol, and is willing to proceed.  Education was provided via written and auditory forms.  There were no barriers to learning. Written handouts regarding wound care, incision and scar care, and general preoperative information, as well as risks and benefits were provided to the patient.    _____________________________________________________  CHIEF COMPLAINT:  Chief Complaint   Patient presents with    Right Middle Finger - Pain         SUBJECTIVE:  Alex Arthur is a 68 y.o. male who presents with Pain  Moderate  Intermittant  Aching to the bilateral long finger.  This started about a year ago on left, and within the last few weeks on right long finger he has started noticing similar symptoms.    Radiation: Occasionally up into right distal forearm   Previous Treatments: steroid injections x 2 to left  Associated symptoms: Catching and Locking  Handedness: right  Work status: retired vascular surgeon    PAST MEDICAL HISTORY:  Past Medical History:   Diagnosis Date    Acute blood loss anemia (ABLA) 06/07/2024    Colon polyp     GERD (gastroesophageal reflux disease)     Hypercholesteremia     Irregular heart beat     PAC    Lumbar radiculitis     OA (osteoarthritis)     Prostate CA (HCC)     Pyriformis syndrome     Shoulder impingement, left        PAST SURGICAL  HISTORY:  Past Surgical History:   Procedure Laterality Date    CHOLECYSTECTOMY      COLONOSCOPY      EGD      EGD AND COLONOSCOPY      IR MESENTERIC/VISCERAL ANGIOGRAM  5/29/2024    KNEE ARTHROSCOPY Bilateral     KNEE SURGERY Left     benign tumor removed     [facility] LAPAROSCOPY SURG CHOLECYSTECTOMY N/A 10/9/2020    Procedure: LAPAROSCOPIC CHOLECYSTECTOMY, umbilical hernia repair;  Surgeon: Yo Leyva MD;  Location: [facility];  Service: General    PROSTATE SURGERY      2007       FAMILY HISTORY:  Family History   Problem Relation Age of Onset    Lymphoma Mother     Lung cancer Father     Heart disease Brother     Other Brother         multisystem atrophy    Colon cancer Maternal Grandmother        SOCIAL HISTORY:  Social History     Tobacco Use    Smoking status: Never    Smokeless tobacco: Never   Vaping Use    Vaping status: Never Used   Substance Use Topics    Alcohol use: Yes     Alcohol/week: 1.0 standard drink of alcohol     Types: 1 Glasses of wine per week    Drug use: Never       MEDICATIONS:    Current Outpatient Medications:     acetaminophen (TYLENOL) 325 mg tablet, Take 2 tablets (650 mg total) by mouth every 6 (six) hours as needed for mild pain, moderate pain or severe pain, Disp: , Rfl:     Cholecalciferol (Dialyvite Vitamin D 5000) 125 MCG (5000 UT) capsule, Take 5,000 Units by mouth daily  , Disp: , Rfl:     flecainide (TAMBOCOR) 50 mg tablet, Take 1 tablet (50 mg total) by mouth in the morning, Disp: , Rfl:     omeprazole (PriLOSEC) 20 mg delayed release capsule, TAKE 1 CAPSULE BY MOUTH EVERY DAY, Disp: 90 capsule, Rfl: 4    oxazepam (SERAX) 10 mg capsule, Take 2 capsules (20 mg total) by mouth daily at bedtime as needed for sleep, Disp: 180 capsule, Rfl: 3    psyllium (METAMUCIL) packet, Take 1 packet by mouth daily, Disp: , Rfl:     rosuvastatin (CRESTOR) 10 MG tablet, TAKE 1 TABLET BY MOUTH EVERY DAY, Disp: 90 tablet, Rfl: 1    senna-docusate sodium (SENOKOT S) 8.6-50 mg per tablet, Take 1  "tablet by mouth daily at bedtime Hold loose stools, Disp: , Rfl:     sildenafil (VIAGRA) 100 mg tablet, Take 1 tablet (100 mg total) by mouth daily as needed for erectile dysfunction, Disp: 20 tablet, Rfl: 3    aspirin 81 mg chewable tablet, Chew (Patient not taking: Reported on 5/31/2024), Disp: , Rfl:     tobramycin-dexamethasone (TOBRADEX) ophthalmic suspension, Administer 1 drop into the left eye every 4 (four) hours while awake (Patient not taking: Reported on 5/31/2024), Disp: 5 mL, Rfl: 0    ALLERGIES:  No Known Allergies    REVIEW OF SYSTEMS:  Pertinent items are noted in HPI.  A comprehensive review of systems was negative.    LABS:  HgA1c:   Lab Results   Component Value Date    HGBA1C 5.6 08/01/2019     BMP:   Lab Results   Component Value Date    GLUCOSE 164 (H) 05/29/2024    CALCIUM 9.4 06/07/2024     06/11/2015    K 3.7 06/07/2024    CO2 27 06/07/2024     06/07/2024    BUN 17 06/07/2024    CREATININE 1.05 06/07/2024         _____________________________________________________  PHYSICAL EXAMINATION:  Vital signs: /72   Pulse 56   Ht 5' 9\" (1.753 m)   Wt 82.5 kg (181 lb 14.4 oz)   BMI 26.86 kg/m²   General: well developed and well nourished, alert, oriented times 3, and appears comfortable  Psychiatric: Normal  HEENT: Trachea Midline, No torticollis  Cardiovascular: No discernable arrhythmia  Pulmonary: No wheezing or stridor  Abdomen: No rebound or guarding  Extremities: No peripheral edema  Skin: No masses, erythema, lacerations, fluctation, ulcerations  Neurovascular: Sensation Intact to the Median, Ulnar, Radial Nerve, Motor Intact to the Median, Ulnar, Radial Nerve, and Pulses Intact    MUSCULOSKELETAL EXAMINATION:  LEFT SIDE:  Finger:  Triggering  long finger, palpable clicking long finger, nodule A1 pulley long finger  RIGHT SIDE:  Finger:  Triggering  long finger, Palpable clicking long finger, and Nodule A1 pulley  long " finger    _____________________________________________________  STUDIES REVIEWED:  none      PROCEDURES PERFORMED:  Hand/upper extremity injection: bilateral long A1  Universal Protocol:  Consent: Verbal consent obtained.  Supporting Documentation  Indications: diagnostic, pain and tendon swelling   Procedure Details  Condition:trigger finger Location: long finger - bilateral long A1   Needle size: 25 G  Ultrasound guidance: no  Approach: volar  Medications (Right): 6 mg betamethasone acetate-betamethasone sodium phosphate 6 (3-3) mg/mL; 1 mL ropivacaine 0.5 %Medications (Left): 6 mg betamethasone acetate-betamethasone sodium phosphate 6 (3-3) mg/mL; 1 mL ropivacaine 0.5 %   Patient tolerance: patient tolerated the procedure well with no immediate complications  Dressing:  Sterile dressing applied        Scribe Attestation      I,:   am acting as a scribe while in the presence of the attending physician.:       I,:   personally performed the services described in this documentation    as scribed in my presence.:

## 2024-08-02 ENCOUNTER — TELEPHONE (OUTPATIENT)
Age: 69
End: 2024-08-02

## 2024-08-02 NOTE — TELEPHONE ENCOUNTER
Pt returning call. After verifying info, pt was getting in another call from [facility] so he p/u that call. Call ended shortly after.

## 2024-08-22 PROBLEM — I99.8: Status: ACTIVE | Noted: 2024-08-22

## 2024-09-10 ENCOUNTER — APPOINTMENT (OUTPATIENT)
Dept: LAB | Facility: HOSPITAL | Age: 69
End: 2024-09-10
Payer: MEDICARE

## 2024-09-10 ENCOUNTER — HOSPITAL ENCOUNTER (OUTPATIENT)
Dept: CT IMAGING | Facility: HOSPITAL | Age: 69
Discharge: HOME | End: 2024-09-10
Attending: SURGERY
Payer: MEDICARE

## 2024-09-10 DIAGNOSIS — I72.8 PSEUDOANEURYSM OF INTRA-ABDOMINAL ARTERY (HCC): ICD-10-CM

## 2024-09-10 DIAGNOSIS — D62 ACUTE BLOOD LOSS ANEMIA (ABLA): ICD-10-CM

## 2024-09-10 LAB
ANION GAP SERPL CALCULATED.3IONS-SCNC: 3 MMOL/L (ref 4–13)
BUN SERPL-MCNC: 17 MG/DL (ref 5–25)
CALCIUM SERPL-MCNC: 9.2 MG/DL (ref 8.4–10.2)
CHLORIDE SERPL-SCNC: 99 MMOL/L (ref 96–108)
CO2 SERPL-SCNC: 34 MMOL/L (ref 21–32)
CREAT SERPL-MCNC: 1.11 MG/DL (ref 0.6–1.3)
ERYTHROCYTE [DISTWIDTH] IN BLOOD BY AUTOMATED COUNT: 12 % (ref 11.6–15.1)
GFR SERPL CREATININE-BSD FRML MDRD: 66 ML/MIN/1.73SQ M
GLUCOSE P FAST SERPL-MCNC: 105 MG/DL (ref 65–99)
HCT VFR BLD AUTO: 44.5 % (ref 36.5–49.3)
HGB BLD-MCNC: 14.8 G/DL (ref 12–17)
MCH RBC QN AUTO: 30 PG (ref 26.8–34.3)
MCHC RBC AUTO-ENTMCNC: 33.3 G/DL (ref 31.4–37.4)
MCV RBC AUTO: 90 FL (ref 82–98)
PLATELET # BLD AUTO: 238 THOUSANDS/UL (ref 149–390)
PMV BLD AUTO: 8.8 FL (ref 8.9–12.7)
POTASSIUM SERPL-SCNC: 4.3 MMOL/L (ref 3.5–5.3)
RBC # BLD AUTO: 4.94 MILLION/UL (ref 3.88–5.62)
SODIUM SERPL-SCNC: 136 MMOL/L (ref 135–147)
WBC # BLD AUTO: 6.02 THOUSAND/UL (ref 4.31–10.16)

## 2024-09-10 PROCEDURE — 36415 COLL VENOUS BLD VENIPUNCTURE: CPT

## 2024-09-10 PROCEDURE — 74174 CTA ABD&PLVS W/CONTRAST: CPT

## 2024-09-10 PROCEDURE — 85027 COMPLETE CBC AUTOMATED: CPT

## 2024-09-10 PROCEDURE — 80048 BASIC METABOLIC PNL TOTAL CA: CPT

## 2024-09-10 RX ADMIN — IOHEXOL 75 ML: 350 INJECTION, SOLUTION INTRAVENOUS at 08:44

## 2024-09-12 ENCOUNTER — TELEPHONE (OUTPATIENT)
Age: 69
End: 2024-09-12

## 2024-09-12 NOTE — TELEPHONE ENCOUNTER
Patients GI provider:  Dr. Byers    Number to return call: [phone]    Reason for call: Pt called instating the IR and vascular team does not think the EUS is needed on 11/6/24. Pt is requesting to speak with Dr. Byers to further discuss, thank you.    Scheduled procedure/appointment date if applicable: Apt/procedure 11/6/24

## 2024-09-16 NOTE — TELEPHONE ENCOUNTER
As per Dr. Byers' instructions, EUS cancelled for November and recall for EGD/Colonoscopy set for 2026.

## 2024-10-18 DIAGNOSIS — E78.2 MIXED HYPERLIPIDEMIA: ICD-10-CM

## 2024-10-21 RX ORDER — ROSUVASTATIN CALCIUM 10 MG/1
10 TABLET, COATED ORAL DAILY
Qty: 90 TABLET | Refills: 1 | Status: SHIPPED | OUTPATIENT
Start: 2024-10-21

## 2024-10-30 ENCOUNTER — OFFICE VISIT (OUTPATIENT)
Dept: OBGYN CLINIC | Facility: HOSPITAL | Age: 69
End: 2024-10-30
Payer: MEDICARE

## 2024-10-30 VITALS — HEIGHT: 69 IN | WEIGHT: 181 LBS | BODY MASS INDEX: 26.81 KG/M2

## 2024-10-30 DIAGNOSIS — M65.332 TRIGGER MIDDLE FINGER OF LEFT HAND: Primary | ICD-10-CM

## 2024-10-30 DIAGNOSIS — M65.331 TRIGGER MIDDLE FINGER OF RIGHT HAND: ICD-10-CM

## 2024-10-30 PROCEDURE — 20550 NJX 1 TENDON SHEATH/LIGAMENT: CPT | Performed by: ORTHOPAEDIC SURGERY

## 2024-10-30 PROCEDURE — 99213 OFFICE O/P EST LOW 20 MIN: CPT | Performed by: ORTHOPAEDIC SURGERY

## 2024-10-30 RX ORDER — LIDOCAINE HYDROCHLORIDE 5 MG/ML
1 INJECTION, SOLUTION INFILTRATION; PERINEURAL
Status: COMPLETED | OUTPATIENT
Start: 2024-10-30 | End: 2024-10-30

## 2024-10-30 RX ORDER — BETAMETHASONE SODIUM PHOSPHATE AND BETAMETHASONE ACETATE 3; 3 MG/ML; MG/ML
6 INJECTION, SUSPENSION INTRA-ARTICULAR; INTRALESIONAL; INTRAMUSCULAR; SOFT TISSUE
Status: COMPLETED | OUTPATIENT
Start: 2024-10-30 | End: 2024-10-30

## 2024-10-30 RX ADMIN — LIDOCAINE HYDROCHLORIDE 1 ML: 5 INJECTION, SOLUTION INFILTRATION; PERINEURAL at 08:45

## 2024-10-30 RX ADMIN — BETAMETHASONE SODIUM PHOSPHATE AND BETAMETHASONE ACETATE 6 MG: 3; 3 INJECTION, SUSPENSION INTRA-ARTICULAR; INTRALESIONAL; INTRAMUSCULAR; SOFT TISSUE at 08:45

## 2024-10-30 NOTE — PROGRESS NOTES
ASSESSMENT/PLAN:    Assessment:   Right long finger trigger finger (2nd injection today)  Left long finger trigger finger (3rd injection 7/31/24)    Plan:   Patient provided with his 2nd right long trigger finger injection, tolerated well.   Discussed night time symptoms  Activities as tolerated    Follow Up:  PRN    To Do Next Visit:  Recheck    General Discussions:  Trigger FInger: The anatomy and physiology of trigger finger was discussed with the patient today in the office.  Edema and increased contact pressure within the flexor tendons at the A1 pulley can cause pain, crepitation, and limitation of function.  Treatment options include resting MP blocking splints to decrease edema, oral anti-inflammatory medications, home or formal therapy exercises, up to 2 steroid injections within the tendon sheath, or surgical release.  While majority of patients do respond to conservative treatment, up to 20% may require surgical release.       _____________________________________________________  CHIEF COMPLAINT:  Chief Complaint   Patient presents with    Right Middle Finger - Follow-up, Triggering         SUBJECTIVE:  Alex Arthur is a 69 y.o. male who presents for follow up regarding Trigger Finger  bilateral  long finger.  Since last visit, Alex Arthur has tried steroid injections with only partial relief.  Today there is Catching and Locking to the bilateral long finger.        PAST MEDICAL HISTORY:  Past Medical History:   Diagnosis Date    Acute blood loss anemia (ABLA) 06/07/2024    Colon polyp     GERD (gastroesophageal reflux disease)     Hypercholesteremia     Irregular heart beat     PAC    Lumbar radiculitis     OA (osteoarthritis)     Prostate CA (HCC)     Pyriformis syndrome     Shoulder impingement, left        PAST SURGICAL HISTORY:  Past Surgical History:   Procedure Laterality Date    CHOLECYSTECTOMY      COLONOSCOPY      EGD      EGD AND COLONOSCOPY      IR MESENTERIC/VISCERAL ANGIOGRAM  5/29/2024     KNEE ARTHROSCOPY Bilateral     KNEE SURGERY Left     benign tumor removed     [facility] LAPAROSCOPY SURG CHOLECYSTECTOMY N/A 10/9/2020    Procedure: LAPAROSCOPIC CHOLECYSTECTOMY, umbilical hernia repair;  Surgeon: Yo Leyva MD;  Location[facility];  Service: General    PROSTATE SURGERY      2007       FAMILY HISTORY:  Family History   Problem Relation Age of Onset    Lymphoma Mother     Lung cancer Father     Heart disease Brother     Other Brother         multisystem atrophy    Colon cancer Maternal Grandmother        SOCIAL HISTORY:  Social History     Tobacco Use    Smoking status: Never    Smokeless tobacco: Never   Vaping Use    Vaping status: Never Used   Substance Use Topics    Alcohol use: Yes     Alcohol/week: 1.0 standard drink of alcohol     Types: 1 Glasses of wine per week    Drug use: Never       MEDICATIONS:    Current Outpatient Medications:     acetaminophen (TYLENOL) 325 mg tablet, Take 2 tablets (650 mg total) by mouth every 6 (six) hours as needed for mild pain, moderate pain or severe pain, Disp: , Rfl:     Cholecalciferol (Dialyvite Vitamin D 5000) 125 MCG (5000 UT) capsule, Take 5,000 Units by mouth daily  , Disp: , Rfl:     flecainide (TAMBOCOR) 50 mg tablet, Take 1 tablet (50 mg total) by mouth in the morning, Disp: , Rfl:     omeprazole (PriLOSEC) 20 mg delayed release capsule, TAKE 1 CAPSULE BY MOUTH EVERY DAY, Disp: 90 capsule, Rfl: 4    oxazepam (SERAX) 10 mg capsule, Take 2 capsules (20 mg total) by mouth daily at bedtime as needed for sleep, Disp: 180 capsule, Rfl: 3    psyllium (METAMUCIL) packet, Take 1 packet by mouth daily, Disp: , Rfl:     rosuvastatin (CRESTOR) 10 MG tablet, TAKE 1 TABLET BY MOUTH EVERY DAY, Disp: 90 tablet, Rfl: 1    senna-docusate sodium (SENOKOT S) 8.6-50 mg per tablet, Take 1 tablet by mouth daily at bedtime Hold loose stools, Disp: , Rfl:     sildenafil (VIAGRA) 100 mg tablet, Take 1 tablet (100 mg total) by mouth daily as needed for erectile dysfunction,  "Disp: 20 tablet, Rfl: 3    aspirin 81 mg chewable tablet, Chew (Patient not taking: Reported on 5/31/2024), Disp: , Rfl:     tobramycin-dexamethasone (TOBRADEX) ophthalmic suspension, Administer 1 drop into the left eye every 4 (four) hours while awake (Patient not taking: Reported on 5/31/2024), Disp: 5 mL, Rfl: 0    ALLERGIES:  No Known Allergies    REVIEW OF SYSTEMS:  Pertinent items are noted in HPI.  A comprehensive review of systems was negative.    LABS:  HgA1c:   Lab Results   Component Value Date    HGBA1C 5.6 08/01/2019     BMP:   Lab Results   Component Value Date    GLUCOSE 164 (H) 05/29/2024    CALCIUM 9.2 09/10/2024     06/11/2015    K 4.3 09/10/2024    CO2 34 (H) 09/10/2024    CL 99 09/10/2024    BUN 17 09/10/2024    CREATININE 1.11 09/10/2024           _____________________________________________________  PHYSICAL EXAMINATION:  Vital signs: Ht 5' 9\" (1.753 m)   Wt 82.1 kg (181 lb)   BMI 26.73 kg/m²   General: well developed and well nourished, alert, oriented times 3, and appears comfortable  Psychiatric: Normal  HEENT: Trachea Midline, No torticollis  Cardiovascular: No discernable arrhythmia  Pulmonary: No wheezing or stridor  Abdomen: No rebound or guarding  Extremities: No peripheral edema  Skin: No masses, erythema, lacerations, fluctation, ulcerations  Neurovascular: Sensation Intact to the Median, Ulnar, Radial Nerve, Motor Intact to the Median, Ulnar, Radial Nerve, and Pulses Intact    MUSCULOSKELETAL EXAMINATION:  LEFT SIDE:  Finger:  Triggering  long finger, palpable clicking long finger, nodule A1 pulley long finger  RIGHT SIDE:  Finger:  Triggering  long finger, Palpable clicking long finger, and Nodule A1 pulley  long finger, no crepitation, 90% composite fist.     _____________________________________________________  STUDIES REVIEWED:  No Studies to review      PROCEDURES PERFORMED:  Hand/upper extremity injection: R long A1  Universal Protocol:  procedure performed by " consultantConsent: Verbal consent obtained.  Consent given by: patient  Patient understanding: patient states understanding of the procedure being performed  Site marked: the operative site was marked  Patient identity confirmed: verbally with patient  Supporting Documentation  Indications: pain   Procedure Details  Condition:trigger finger Location: long finger - R long A1   Needle size: 25 G  Ultrasound guidance: no  Approach: volar  Medications administered: 6 mg betamethasone acetate-betamethasone sodium phosphate 6 (3-3) mg/mL; 1 mL lidocaine 0.5 %  Patient tolerance: patient tolerated the procedure well with no immediate complications  Dressing:  Sterile dressing applied              Scribe Attestation      I,:  Gregoria Martinez MA am acting as a scribe while in the presence of the attending physician.:       I,:  Taj Ivy MD personally performed the services described in this documentation    as scribed in my presence.:

## 2024-11-07 ENCOUNTER — OFFICE VISIT (OUTPATIENT)
Dept: FAMILY MEDICINE CLINIC | Facility: CLINIC | Age: 69
End: 2024-11-07
Payer: MEDICARE

## 2024-11-07 VITALS
HEART RATE: 61 BPM | HEIGHT: 69 IN | SYSTOLIC BLOOD PRESSURE: 118 MMHG | DIASTOLIC BLOOD PRESSURE: 74 MMHG | OXYGEN SATURATION: 97 % | WEIGHT: 182.4 LBS | BODY MASS INDEX: 27.02 KG/M2 | TEMPERATURE: 98 F

## 2024-11-07 DIAGNOSIS — M54.50 ACUTE BILATERAL LOW BACK PAIN WITHOUT SCIATICA: ICD-10-CM

## 2024-11-07 DIAGNOSIS — E55.9 VITAMIN D DEFICIENCY: ICD-10-CM

## 2024-11-07 DIAGNOSIS — Z23 ENCOUNTER FOR IMMUNIZATION: ICD-10-CM

## 2024-11-07 DIAGNOSIS — R53.82 CHRONIC FATIGUE: ICD-10-CM

## 2024-11-07 DIAGNOSIS — Z79.899 ON LONG TERM DRUG THERAPY: ICD-10-CM

## 2024-11-07 DIAGNOSIS — Z13.6 SCREENING FOR CARDIOVASCULAR, RESPIRATORY, AND GENITOURINARY DISEASES: ICD-10-CM

## 2024-11-07 DIAGNOSIS — C61 MALIGNANT NEOPLASM OF PROSTATE (HCC): ICD-10-CM

## 2024-11-07 DIAGNOSIS — Z13.89 SCREENING FOR HEMATURIA OR PROTEINURIA: ICD-10-CM

## 2024-11-07 DIAGNOSIS — Z13.89 SCREENING FOR CARDIOVASCULAR, RESPIRATORY, AND GENITOURINARY DISEASES: ICD-10-CM

## 2024-11-07 DIAGNOSIS — Z00.00 ENCOUNTER FOR MEDICARE ANNUAL WELLNESS EXAM: Primary | ICD-10-CM

## 2024-11-07 DIAGNOSIS — Z13.83 SCREENING FOR CARDIOVASCULAR, RESPIRATORY, AND GENITOURINARY DISEASES: ICD-10-CM

## 2024-11-07 DIAGNOSIS — E78.2 MIXED HYPERLIPIDEMIA: ICD-10-CM

## 2024-11-07 PROCEDURE — 90662 IIV NO PRSV INCREASED AG IM: CPT | Performed by: FAMILY MEDICINE

## 2024-11-07 PROCEDURE — 99213 OFFICE O/P EST LOW 20 MIN: CPT | Performed by: FAMILY MEDICINE

## 2024-11-07 PROCEDURE — G0402 INITIAL PREVENTIVE EXAM: HCPCS | Performed by: FAMILY MEDICINE

## 2024-11-07 PROCEDURE — G0008 ADMIN INFLUENZA VIRUS VAC: HCPCS | Performed by: FAMILY MEDICINE

## 2024-11-07 NOTE — PROGRESS NOTES
Ambulatory Visit  Name: Alex Arthur      : 1955      MRN: [id number]  Encounter Provider: HARINDER Pacheco DO  Encounter Date: 2024   Encounter department: [facility] PRIMARY [facility]    Assessment & Plan       Preventive health issues were discussed with patient, and age appropriate screening tests were ordered as noted in patient's After Visit Summary. Personalized health advice and appropriate referrals for health education or preventive services given if needed, as noted in patient's After Visit Summary.    History of Present Illness     Back Pain       Patient Care Team:  HARINDER Pacheco DO as PCP - General (Family Medicine)    Review of Systems   Musculoskeletal:  Positive for back pain.     Medical History Reviewed by provider this encounter:       Annual Wellness Visit Questionnaire   Alex is here for his Welcome to Medicare visit.     Health Risk Assessment:   Patient rates overall health as very good. Patient feels that their physical health rating is same. Patient is satisfied with their life. Eyesight was rated as same. Hearing was rated as same. Patient feels that their emotional and mental health rating is same. Patients states they are never, rarely angry. Patient states they are never, rarely unusually tired/fatigued. Pain experienced in the last 7 days has been some. Patient's pain rating has been 3/10. Patient states that he has experienced no weight loss or gain in last 6 months. LBP that alleviates with stretching. Requesting to undergo PT for this.     Depression Screening:   PHQ-2 Score: 0      Fall Risk Screening:   In the past year, patient has experienced: no history of falling in past year      Home Safety:  Patient does not have trouble with stairs inside or outside of their home. Patient has working smoke alarms and has working carbon monoxide detector. Home safety hazards include: none.     Nutrition:   Current diet is Regular, Limited junk food, Other (please comment),  Low Cholesterol and Low Saturated Fat. Pt has lots of fiber intake. Lots of salmon for protein, fruits and vegetables.     Medications:   Patient is currently taking over-the-counter supplements. OTC medications include: see medication list. Patient is able to manage medications.     Activities of Daily Living (ADLs)/Instrumental Activities of Daily Living (IADLs):   Walk and transfer into and out of bed and chair?: Yes  Dress and groom yourself?: Yes    Bathe or shower yourself?: Yes    Feed yourself? Yes  Do your laundry/housekeeping?: Yes  Manage your money, pay your bills and track your expenses?: Yes  Make your own meals?: Yes    Do your own shopping?: Yes    Previous Hospitalizations:   Any hospitalizations or ED visits within the last 12 months?: Yes    How many hospitalizations have you had in the last year?: 1-2    Hospitalization Comments: Pseudoaneurysm of intra-abdominal artery    Advance Care Planning:   Living will: No    Durable POA for healthcare: No    Advanced directive: No    Advanced directive counseling given: Yes    ACP document given: Yes    Patient declined ACP directive: No    End of Life Decisions reviewed with patient: Yes    Provider agrees with end of life decisions: Yes      Cognitive Screening:   Provider or family/friend/caregiver concerned regarding cognition?: No    PREVENTIVE SCREENINGS      Cardiovascular Screening:    General: Screening Not Indicated, History Lipid Disorder and Risks and Benefits Discussed      Diabetes Screening:     General: Screening Current and Risks and Benefits Discussed      Colorectal Cancer Screening:     General: Screening Current      Prostate Cancer Screening:    General: History Prostate Cancer and Risks and Benefits Discussed      Osteoporosis Screening:    General: Risks and Benefits Discussed      Abdominal Aortic Aneurysm (AAA) Screening:    Risk factors include: age between 65-74 yo        General: Screening Not Indicated, History AAA and Risks  and Benefits Discussed      Lung Cancer Screening:     General: Screening Not Indicated      Hepatitis C Screening:    General: Screening Current and Risks and Benefits Discussed    Hep C Screening Accepted: No     Screening, Brief Intervention, and Referral to Treatment (SBIRT)    Screening  Typical number of drinks in a day: 1  Typical number of drinks in a week: 7  Interpretation: Low risk drinking behavior.    Single Item Drug Screening:  How often have you used an illegal drug (including marijuana) or a prescription medication for non-medical reasons in the past year? never    Single Item Drug Screen Score: 0  Interpretation: Negative screen for possible drug use disorder    Brief Intervention  Alcohol & drug use screenings were reviewed. No concerns regarding substance use disorder identified. Healthy alcohol use/limits discussed.     Other Counseling Topics:   Car/seat belt/driving safety, skin self-exam, sunscreen and calcium and vitamin D intake and regular weightbearing exercise.        No results found.    Objective     There were no vitals taken for this visit.    Physical Exam

## 2024-11-07 NOTE — PROGRESS NOTES
Ambulatory Visit  Name: Alex Arthur      : 1955      MRN: [id number]  Encounter Provider: HARINDER Pacheco DO  Encounter Date: 2024   Encounter department: [facility] [place]    Assessment & Plan  Encounter for Medicare annual wellness exam  This was accomplished today as well as a yearly office visit he is new to Medicare since he retired end of 2024       Chronic fatigue  Likely stresses of life and exercise he is a very exercise active person  Orders:    CBC; Future    TSH, 3rd generation; Future    Screening for hematuria or proteinuria    Orders:    UA w Reflex to Microscopic w Reflex to Culture    Albumin / creatinine urine ratio    Screening for cardiovascular, respiratory, and genitourinary diseases    Orders:    Comprehensive metabolic panel; Future    Mixed hyperlipidemia  Taking and tolerating Crestor 10 mg once daily very well  Orders:    Lipid panel; Future    Vitamin D deficiency  Monitoring vitamin D levels regular  Orders:    Vitamin D 25 hydroxy; Future    On long term drug therapy  All medications reviewed for safety efficacy and tolerance  Orders:    Hemoglobin A1C; Future    Malignant neoplasm of prostate (HCC)  Had radical prostatectomy in        Acute bilateral low back pain without sciatica    Orders:    Ambulatory referral to Physical Therapy; Future    Encounter for immunization  Flu vaccine discussed and given today  Orders:    influenza vaccine, high-dose, PF 0.5 mL (Fluzone High Dose)      Depression Screening and Follow-up Plan: Patient was screened for depression during today's encounter. They screened negative with a PHQ-2 score of 0.        History of Present Illness     History of Present Illness       Review of Systems   Constitutional:  Negative for activity change, appetite change, chills, diaphoresis, fatigue, fever and unexpected weight change.   HENT:  Negative for congestion, dental problem, drooling, ear discharge, ear pain, facial swelling,  "mouth sores, nosebleeds, postnasal drip, rhinorrhea, trouble swallowing and voice change.    Eyes:  Negative for photophobia, pain, discharge, redness, itching and visual disturbance.   Respiratory:  Negative for apnea, cough, choking, chest tightness and shortness of breath.    Cardiovascular:  Negative for chest pain and leg swelling.   Gastrointestinal:  Negative for abdominal distention, abdominal pain, constipation, diarrhea and nausea.        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  Doing well now and back to normal   Endocrine: Negative for polydipsia, polyphagia and polyuria.   Genitourinary:  Negative for decreased urine volume, difficulty urinating, dysuria, enuresis and hematuria.   Musculoskeletal:  Positive for back pain. Negative for arthralgias, gait problem and joint swelling.        Onset a wk ago, getting OOcar   Skin:  Negative for color change, pallor, rash and wound.   Allergic/Immunologic: Negative for immunocompromised state.   Neurological:  Negative for dizziness, seizures, syncope, facial asymmetry, speech difficulty, light-headedness and headaches.   Hematological:  Negative for adenopathy.   Psychiatric/Behavioral:  Negative for agitation, behavioral problems, confusion and decreased concentration.      Objective     /74 (BP Location: Left arm, Patient Position: Sitting, Cuff Size: Standard)   Pulse 61   Temp 98 °F (36.7 °C) (Temporal)   Ht 5' 9\" (1.753 m)   Wt 82.7 kg (182 lb 6.4 oz)   SpO2 97%   BMI 26.94 kg/m²     Physical Exam    Physical Exam  Vitals and nursing note reviewed.   Constitutional:       General: He is not in acute distress.     Appearance: Normal appearance. He is well-developed. He is not ill-appearing, toxic-appearing or diaphoretic.   HENT:      Head: Normocephalic and atraumatic.      Nose: Nose normal.      Mouth/Throat:      Mouth: Mucous membranes are moist.   Eyes:      Extraocular Movements: " Extraocular movements intact.      Conjunctiva/sclera: Conjunctivae normal.      Pupils: Pupils are equal, round, and reactive to light.   Cardiovascular:      Rate and Rhythm: Normal rate and regular rhythm.      Pulses: Normal pulses.      Heart sounds: Normal heart sounds. No murmur heard.     No friction rub.   Pulmonary:      Effort: Pulmonary effort is normal. No respiratory distress.      Breath sounds: Normal breath sounds. No stridor. No wheezing or rhonchi.   Abdominal:      Palpations: Abdomen is soft.      Tenderness: There is no abdominal tenderness.   Musculoskeletal:         General: No swelling.      Cervical back: Neck supple.   Skin:     General: Skin is warm and dry.      Coloration: Skin is not jaundiced or pale.      Findings: No erythema or rash.   Neurological:      General: No focal deficit present.      Mental Status: He is alert and oriented to person, place, and time. Mental status is at baseline.   Psychiatric:         Mood and Affect: Mood normal.         Behavior: Behavior normal.         Thought Content: Thought content normal.         Judgment: Judgment normal.       Administrative Statements   I have spent a total time of 40 minutes in caring for this patient on the day of the visit/encounter including Diagnostic results, Prognosis, Risks and benefits of tx options, Instructions for management, Patient and family education, Importance of tx compliance, Risk factor reductions, Impressions, Counseling / Coordination of care, Documenting in the medical record, Reviewing / ordering tests, medicine, procedures  , and Obtaining or reviewing history  .

## 2024-11-08 ENCOUNTER — OFFICE VISIT (OUTPATIENT)
Dept: PHYSICAL THERAPY | Facility: CLINIC | Age: 69
End: 2024-11-08
Payer: MEDICARE

## 2024-11-08 DIAGNOSIS — M48.9 LUMBAR DYSFUNCTION: Primary | ICD-10-CM

## 2024-11-08 PROCEDURE — 97161 PT EVAL LOW COMPLEX 20 MIN: CPT | Performed by: PHYSICAL THERAPIST

## 2024-11-08 PROCEDURE — 97110 THERAPEUTIC EXERCISES: CPT | Performed by: PHYSICAL THERAPIST

## 2024-11-08 PROCEDURE — 97530 THERAPEUTIC ACTIVITIES: CPT | Performed by: PHYSICAL THERAPIST

## 2024-11-08 NOTE — ASSESSMENT & PLAN NOTE
Flu vaccine discussed and given today  Orders:    influenza vaccine, high-dose, PF 0.5 mL (Fluzone High Dose)

## 2024-11-08 NOTE — PROGRESS NOTES
PT Evaluation     Today's date: 2024  Patient name: Alex Arthur  : 1955  MRN: [id number]  Referring provider: HARINDER Pacheco DO  Dx:   Encounter Diagnosis     ICD-10-CM    1. Lumbar dysfunction  M48.9                      Assessment  Impairments: abnormal or restricted ROM, activity intolerance, impaired physical strength and pain with function  Symptom irritability: low    Assessment details: Alex Arthur is a 69 y.o. male presenting to outpatient physical therapy at [facility] with complaints of low back pain, stiffness and weakness.  He presents with decreased range of motion, decreased strength, limited flexibility, poor postural awareness, poor body mechanics, decreased tolerance to activity and decreased functional mobility due to lumbar strain. He would benefit from skilled PT services in order to address these deficits and reach maximum level of function.  Thank you for the referral!    Understanding of Dx/Px/POC: good     Prognosis: good    Goals  STG  1. Independent with HEP in 2 weeks  2. Decrease pain at worst by 50% in 2 weeks    LTG  1. Increase lumbopelvic mobility & strength in all planes to 5/5 in 4 weeks  2. Return to full, pain-free and unrestricted golf in 4 weeks        Plan  Patient would benefit from: skilled physical therapy  Planned modality interventions: thermotherapy: hydrocollator packs    Planned therapy interventions: manual therapy, neuromuscular re-education, therapeutic activities and therapeutic exercise    Frequency: 1x week  Duration in weeks: 4  Treatment plan discussed with: patient        Subjective Evaluation    History of Present Illness  Mechanism of injury: Pt reports chronic history of mid line LBP which exacerbated 2 weeks ago after riding in a low seated [place]. Pt has had some relief with Advil and bridge exercises, LTR and piriformis stretch and CBD oil. Pt denies pain with coughing and sneezing. Feels better as the day goes on, no pain at night. Pt  "sleeps in sidelying on firm mattress. Elliptical, crunches in gym did not exacerbate symptoms.   Patient Goals  Patient goal: Painfree ADLs and golf  Pain  Current pain ratin  At worst pain ratin  Quality: dull ache  Relieving factors: medications  Progression: worsening      Diagnostic Tests  No diagnostic tests performed  Treatments  Previous treatment: injection treatment and massage        Objective   Mechanical Assessment    Cervical      Thoracic      Lumbar    Standing flexion: repeated movements   Pain location:centralized  Pain intensity: better  Pain level: decreased  Standing extension: repeated movements  Pain location: no change    Strength/Myotome Testing     Lumbar   Left   Heel walk: normal  Toe walk: normal    Right   Heel walk: normal  Toe walk: normal    Left Hip   Planes of Motion   Flexion: 5  Extension: 4+  Abduction: 5  Adduction: 5    Right Hip   Planes of Motion   Flexion: 5  Extension: 4+  Abduction: 5  Adduction: 5    Muscle Activation   Patient unable to activate left transverse abdominals and right transverse abdominals.     Tests     Lumbar   Negative prone instability , Valsalva, SIJ compression and sacroiliac distraction.     Left   Negative crossed SLR, passive SLR and slump test.     Right   Positive passive SLR.   Negative crossed SLR and slump test.     Left Hip   Negative ANSHUL.     Right Hip   Negative ANSHUL.             Daily Treatment Diary     FOTO Completed On: 2024    POC Expires Reeval for Medicare to be completed  Unit LImit Auth Expiration Date PT/OT/STVisit Limit   2024 By visit 10 na na na    Completed on visit                    Auth Status DATE         NA Visit # 1         Remaining         MANUAL THERAPY                                                               THERAPEUTIC EXERCISE HEP         Dynamic golf w/u  10'        Crossed LTR  10x10\"        Standing flexion stretch  10x10\"        Seated flexion stretch  10x10\"      "                                                                                                                 NEUROMUSCULAR REEDUCATION                                                                                                                                                       THERAPEUTIC ACTIVITY          Patient education: pathoanatomy, nature of sxs, POC, HEP  NS 15'                                         GAIT TRAINING                                                  MODALITIES

## 2024-11-16 DIAGNOSIS — E78.2 MIXED HYPERLIPIDEMIA: ICD-10-CM

## 2024-11-18 RX ORDER — ROSUVASTATIN CALCIUM 10 MG/1
10 TABLET, COATED ORAL DAILY
Qty: 90 TABLET | Refills: 0 | Status: SHIPPED | OUTPATIENT
Start: 2024-11-18

## 2024-11-29 DIAGNOSIS — H10.33 ACUTE CONJUNCTIVITIS OF BOTH EYES, UNSPECIFIED ACUTE CONJUNCTIVITIS TYPE: Primary | ICD-10-CM

## 2024-12-02 RX ORDER — TOBRAMYCIN AND DEXAMETHASONE 3; 1 MG/ML; MG/ML
SUSPENSION/ DROPS OPHTHALMIC
Qty: 5 ML | Refills: 1 | Status: SHIPPED | OUTPATIENT
Start: 2024-12-02

## 2024-12-19 ENCOUNTER — TELEPHONE (OUTPATIENT)
Age: 69
End: 2024-12-19

## 2024-12-19 NOTE — TELEPHONE ENCOUNTER
Caller: Patient    Doctor: Dr. Ivy    Reason for call: Patient calling stating that he would like to set up surgery for his left middle finger trigger finger and is wondering what he would need to do to get it scheduled?  He can be reached at the number below.      Call back#: [phone]

## 2024-12-20 ENCOUNTER — OFFICE VISIT (OUTPATIENT)
Dept: OBGYN CLINIC | Facility: HOSPITAL | Age: 69
End: 2024-12-20
Payer: MEDICARE

## 2024-12-20 VITALS — HEIGHT: 69 IN | BODY MASS INDEX: 27.05 KG/M2 | WEIGHT: 182.6 LBS

## 2024-12-20 DIAGNOSIS — M65.332 TRIGGER MIDDLE FINGER OF LEFT HAND: Primary | ICD-10-CM

## 2024-12-20 PROCEDURE — 99213 OFFICE O/P EST LOW 20 MIN: CPT | Performed by: PHYSICIAN ASSISTANT

## 2024-12-20 NOTE — PROGRESS NOTES
ASSESSMENT/PLAN:    Assessment:   Right long finger trigger finger (2nd injection today)  Left long finger trigger finger (3rd injection 7/31/24)    Plan:   Patient would like to proceed with surgical intervention at this time  Consents were already obtained for left long finger trigger finger release under local anesthesia  Risks and benefits were reviewed  He will follow-up after surgery for suture removal    Follow Up:  After surgery    To Do Next Visit:  Recheck    General Discussions:  Trigger FInger: The anatomy and physiology of trigger finger was discussed with the patient today in the office.  Edema and increased contact pressure within the flexor tendons at the A1 pulley can cause pain, crepitation, and limitation of function.  Treatment options include resting MP blocking splints to decrease edema, oral anti-inflammatory medications, home or formal therapy exercises, up to 2 steroid injections within the tendon sheath, or surgical release.  While majority of patients do respond to conservative treatment, up to 20% may require surgical release.       _____________________________________________________  CHIEF COMPLAINT:  Chief Complaint   Patient presents with    Left Middle Finger - Follow-up, Triggering     Discuss surgery         SUBJECTIVE:  Alex Arthur is a 69 y.o. male who presents for follow up regarding Trigger Finger  bilateral  long finger.  Since last visit, Alex Arthur has tried steroid injections with only partial relief.  Today there is Catching and Locking to the bilateral long finger.  He states that this time the injections have stopped working and he would like to proceed with surgical intervention.      PAST MEDICAL HISTORY:  Past Medical History:   Diagnosis Date    Acute blood loss anemia (ABLA) 06/07/2024    Colon polyp     GERD (gastroesophageal reflux disease)     Hypercholesteremia     Irregular heart beat     PAC    Lumbar radiculitis     OA (osteoarthritis)     Prostate CA  (HCC)     Pyriformis syndrome     Shoulder impingement, left        PAST SURGICAL HISTORY:  Past Surgical History:   Procedure Laterality Date    CHOLECYSTECTOMY      COLONOSCOPY      EGD      EGD AND COLONOSCOPY      IR MESENTERIC/VISCERAL ANGIOGRAM  5/29/2024    KNEE ARTHROSCOPY Bilateral     KNEE SURGERY Left     benign tumor removed     WI LAPAROSCOPY SURG CHOLECYSTECTOMY N/A 10/9/2020    Procedure: LAPAROSCOPIC CHOLECYSTECTOMY, umbilical hernia repair;  Surgeon: Yo Leyva MD;  Location: [facility];  Service: General    PROSTATE SURGERY      2007       FAMILY HISTORY:  Family History   Problem Relation Age of Onset    Lymphoma Mother     Lung cancer Father     Heart disease Brother     Other Brother         multisystem atrophy    Colon cancer Maternal Grandmother        SOCIAL HISTORY:  Social History     Tobacco Use    Smoking status: Never    Smokeless tobacco: Never   Vaping Use    Vaping status: Never Used   Substance Use Topics    Alcohol use: Yes     Alcohol/week: 1.0 standard drink of alcohol     Types: 1 Glasses of wine per week    Drug use: Never       MEDICATIONS:    Current Outpatient Medications:     acetaminophen (TYLENOL) 325 mg tablet, Take 2 tablets (650 mg total) by mouth every 6 (six) hours as needed for mild pain, moderate pain or severe pain, Disp: , Rfl:     Cholecalciferol (Dialyvite Vitamin D 5000) 125 MCG (5000 UT) capsule, Take 5,000 Units by mouth daily  , Disp: , Rfl:     flecainide (TAMBOCOR) 50 mg tablet, Take 1 tablet (50 mg total) by mouth in the morning, Disp: , Rfl:     Multiple Vitamins-Minerals (CENTRUM SILVER 50+MEN PO), , Disp: , Rfl:     omeprazole (PriLOSEC) 20 mg delayed release capsule, TAKE 1 CAPSULE BY MOUTH EVERY DAY, Disp: 90 capsule, Rfl: 4    oxazepam (SERAX) 10 mg capsule, Take 2 capsules (20 mg total) by mouth daily at bedtime as needed for sleep, Disp: 180 capsule, Rfl: 3    psyllium (METAMUCIL) packet, Take 1 packet by mouth daily, Disp: , Rfl:      "rosuvastatin (CRESTOR) 10 MG tablet, Take 1 tablet (10 mg total) by mouth daily, Disp: 90 tablet, Rfl: 0    senna-docusate sodium (SENOKOT S) 8.6-50 mg per tablet, Take 1 tablet by mouth daily at bedtime Hold loose stools, Disp: , Rfl:     sildenafil (VIAGRA) 100 mg tablet, Take 1 tablet (100 mg total) by mouth daily as needed for erectile dysfunction, Disp: 20 tablet, Rfl: 3    tobramycin-dexamethasone (TOBRADEX) ophthalmic suspension, ADMINISTER 1 DROP INTO THE LEFT EYE EVERY 4 (FOUR) HOURS WHILE AWAKE, Disp: 5 mL, Rfl: 1    aspirin 81 mg chewable tablet, Chew (Patient not taking: Reported on 12/20/2024), Disp: , Rfl:     ALLERGIES:  No Known Allergies    REVIEW OF SYSTEMS:  Pertinent items are noted in HPI.  A comprehensive review of systems was negative.    LABS:  HgA1c:   Lab Results   Component Value Date    HGBA1C 5.6 08/01/2019     BMP:   Lab Results   Component Value Date    GLUCOSE 164 (H) 05/29/2024    CALCIUM 9.2 09/10/2024     06/11/2015    K 4.3 09/10/2024    CO2 34 (H) 09/10/2024    CL 99 09/10/2024    BUN 17 09/10/2024    CREATININE 1.11 09/10/2024           _____________________________________________________  PHYSICAL EXAMINATION:  Vital signs: Ht 5' 9\" (1.753 m)   Wt 82.8 kg (182 lb 9.6 oz)   BMI 26.97 kg/m²   General: well developed and well nourished, alert, oriented times 3, and appears comfortable  Psychiatric: Normal  HEENT: Trachea Midline, No torticollis  Cardiovascular: No discernable arrhythmia  Pulmonary: No wheezing or stridor  Abdomen: No rebound or guarding  Extremities: No peripheral edema  Skin: No masses, erythema, lacerations, fluctation, ulcerations  Neurovascular: Sensation Intact to the Median, Ulnar, Radial Nerve, Motor Intact to the Median, Ulnar, Radial Nerve, and Pulses Intact    MUSCULOSKELETAL EXAMINATION:  LEFT SIDE:  Finger:  Triggering  long finger, palpable clicking long finger, nodule A1 pulley long finger  RIGHT SIDE:  Finger:  Triggering  long finger, " Palpable clicking long finger, and Nodule A1 pulley  long finger, no crepitation, 90% composite fist.     _____________________________________________________  STUDIES REVIEWED:  No Studies to review      PROCEDURES PERFORMED:  Procedures   No additional procedures were performed at today's visit

## 2025-01-13 DIAGNOSIS — K30 FUNCTIONAL DYSPEPSIA: ICD-10-CM

## 2025-01-13 RX ORDER — OXAZEPAM 10 MG
10 CAPSULE ORAL
Qty: 100 CAPSULE | Refills: 1 | Status: SHIPPED | OUTPATIENT
Start: 2025-01-13 | End: 2025-01-20 | Stop reason: SDUPTHER

## 2025-01-20 DIAGNOSIS — K30 FUNCTIONAL DYSPEPSIA: ICD-10-CM

## 2025-01-20 RX ORDER — OXAZEPAM 10 MG
10 CAPSULE ORAL
Qty: 100 CAPSULE | Refills: 1 | Status: SHIPPED | OUTPATIENT
Start: 2025-01-20

## 2025-01-30 ENCOUNTER — HOSPITAL ENCOUNTER (OUTPATIENT)
Facility: HOSPITAL | Age: 70
Setting detail: OUTPATIENT SURGERY
Discharge: HOME | End: 2025-01-30
Attending: ORTHOPAEDIC SURGERY | Admitting: ORTHOPAEDIC SURGERY
Payer: MEDICARE

## 2025-01-30 VITALS
OXYGEN SATURATION: 99 % | RESPIRATION RATE: 19 BRPM | SYSTOLIC BLOOD PRESSURE: 122 MMHG | TEMPERATURE: 97.1 F | BODY MASS INDEX: 26.36 KG/M2 | HEIGHT: 69 IN | DIASTOLIC BLOOD PRESSURE: 80 MMHG | WEIGHT: 178 LBS | HEART RATE: 59 BPM

## 2025-01-30 DIAGNOSIS — M65.332 TRIGGER FINGER, LEFT MIDDLE FINGER: Primary | ICD-10-CM

## 2025-01-30 PROCEDURE — 26055 INCISE FINGER TENDON SHEATH: CPT | Performed by: ORTHOPAEDIC SURGERY

## 2025-01-30 PROCEDURE — NC001 PR NO CHARGE: Performed by: ORTHOPAEDIC SURGERY

## 2025-01-30 PROCEDURE — 26055 INCISE FINGER TENDON SHEATH: CPT | Performed by: PHYSICIAN ASSISTANT

## 2025-01-30 RX ORDER — ACETAMINOPHEN 325 MG/1
650 TABLET ORAL EVERY 6 HOURS PRN
Status: CANCELLED | OUTPATIENT
Start: 2025-01-30

## 2025-01-30 RX ORDER — TRAMADOL HYDROCHLORIDE 50 MG/1
50 TABLET ORAL EVERY 6 HOURS PRN
Status: CANCELLED | OUTPATIENT
Start: 2025-01-30

## 2025-01-30 RX ORDER — COVID-19 ANTIGEN TEST
220 KIT MISCELLANEOUS 2 TIMES DAILY
Qty: 60 CAPSULE | Refills: 0 | Status: SHIPPED | OUTPATIENT
Start: 2025-01-30 | End: 2025-03-01

## 2025-01-30 RX ORDER — ONDANSETRON 2 MG/ML
4 INJECTION INTRAMUSCULAR; INTRAVENOUS EVERY 6 HOURS PRN
Status: CANCELLED | OUTPATIENT
Start: 2025-01-30

## 2025-01-30 RX ORDER — TRAMADOL HYDROCHLORIDE 50 MG/1
50 TABLET ORAL EVERY 6 HOURS PRN
Qty: 5 TABLET | Refills: 0 | Status: SHIPPED | OUTPATIENT
Start: 2025-01-30

## 2025-01-30 RX ORDER — LIDOCAINE HYDROCHLORIDE AND EPINEPHRINE 10; 10 MG/ML; UG/ML
20 INJECTION, SOLUTION INFILTRATION; PERINEURAL ONCE
Status: COMPLETED | OUTPATIENT
Start: 2025-01-30 | End: 2025-01-30

## 2025-01-30 RX ORDER — SENNOSIDES 8.6 MG
650 CAPSULE ORAL EVERY 8 HOURS PRN
Qty: 30 TABLET | Refills: 0 | Status: SHIPPED | OUTPATIENT
Start: 2025-01-30

## 2025-01-30 RX ADMIN — LIDOCAINE HYDROCHLORIDE,EPINEPHRINE BITARTRATE 4 ML: 10; .01 INJECTION, SOLUTION INFILTRATION; PERINEURAL at 07:43

## 2025-01-30 NOTE — H&P
Diagnosis: Left long trigger finger    Procedure: Left long trigger finger release under local anesthesia

## 2025-01-30 NOTE — OP NOTE
OPERATIVE REPORT  PATIENT NAME: Alex Arthur  :  1955  MRN: [id number]  Pt Location: [facility]    SURGERY DATE: 25    Surgeons and Role:     * Taj Ivy MD - Primary     * Saqib Diaz PA-C - Assisting    Pre-Op Diagnosis:  Trigger middle finger of left hand [M65.332]    Post-Op Diagnosis:  .Trigger middle finger of left hand [M65.332]    Procedure(s) (LRB):  Left long trigger finger release (Left)    Specimen(s):  No specimens collected during this procedure.    Estimated Blood Loss:   Minimal      Anesthesia Type:   Local    Operative Indications:  The patient has a history of Trigger Finger  left  long finger that was recalcitrant to conservative management.  The decision was made to bring the patient to the operating room for Trigger Finger Release  left  long finger.  Risks of the procedure were explained which include, but are not limited to bleeding; infection; damage to nerves, arteries,veins, tendons; scar; pain; need for reoperation; failure to give desired result; and risks of anaesthesia.  All questions were answered to satisfaction and they were willing to proceed.         Operative Findings:  Left long finger trigger finger    Complications:   None    Procedure and Technique:  After the patient, site, and procedure were identified, the patient was brought into the operating room in a supine position.  Local anaesthesia was adminstered in the preoperative holding area.  A tourniquet was not used.  The  left upper extremity was then prepped and drapped in a normal, sterile, orthopedic fashion.    After the patient, site, and procedure were once again identified, attention was turned to the left long finger.  An incision was made over the flexor tendon sheath at the level of the A1 pulley.  Dissection was carried out in-line with the flexor tendon sheath and the radial and ulnar digital artery and nerve were protected.  The A1 pulley was identified at the base of the incision.   Under direct visualization, the A1 pulley was divided along the midline in its entirety with care taken to avoid injury to the underlying tendon.  The tendons were examined to ensure that no further catching, popping, clicking or locking occurred with motion of the finger.       At the completion of the procedure, hemostasis was obtained with cautery and direct pressure.  The wounds were copiously irrigated with sterile solution.  The wounds were closed with Prolene.  Sterile dressings were applied, including Xeroform, gauze, tweeners, webril, ACE.  Please note, all sponge, needle, and instrument counts were correct prior to closure.  Loupe magnification was utilized.  The patient tolerated the procedure well.     I was present for all critical portions of the procedure., A qualified resident physician was not available., and A physician assistant was required during the procedure for retraction, tissue handling, dissection and suturing.    Patient Disposition:  PACU  and hemodynamically stable    SIGNATURE: Taj Ivy MD  DATE: 01/30/25  TIME: 8:16 AM

## 2025-02-07 ENCOUNTER — OFFICE VISIT (OUTPATIENT)
Dept: OBGYN CLINIC | Facility: HOSPITAL | Age: 70
End: 2025-02-07

## 2025-02-07 VITALS — WEIGHT: 178 LBS | HEIGHT: 69 IN | BODY MASS INDEX: 26.36 KG/M2

## 2025-02-07 DIAGNOSIS — M65.332 TRIGGER MIDDLE FINGER OF LEFT HAND: Primary | ICD-10-CM

## 2025-02-07 PROCEDURE — 99024 POSTOP FOLLOW-UP VISIT: CPT | Performed by: PHYSICIAN ASSISTANT

## 2025-02-07 NOTE — PROGRESS NOTES
"    ORTHOPAEDIC HAND, WRIST, AND ELBOW OFFICE  VISIT     Name: Alex Arthur      : 1955      MRN: [id number]  Encounter Provider: Saqib Diaz PA-C  Encounter Date: 2025   Encounter department: [facility]  :  Assessment & Plan  Trigger middle finger of left hand  S/p left long finger trigger finger release performed 2025  Patient was advised to avoid soaking for 2 to 3 days to allow the incision to finish healing  They was advised to use heat massage as demonstrated in the office  They will follow-up with us as needed               History of Present Illness   HPI  Chief Complaint   Patient presents with    Left Middle Finger - Post-op, Suture / Staple Removal     TFR- 25       SUBJECTIVE:  Alex Arthur is a 69 y.o. male who presents for follow up after Left long trigger finger release - Left on 2025.  Today patient has no clicking and locking.  He states he has minimal pain and discomfort.       PHYSICAL EXAMINATION:  Vital signs: Ht 5' 9\" (1.753 m)   Wt 80.7 kg (178 lb)   BMI 26.29 kg/m²   General: well developed and well nourished, alert, oriented times 3, and appears comfortable  Psychiatric: Normal    MUSCULOSKELETAL EXAMINATION:  Incision: Clean, dry, intact  Range of Motion: As expected, opposition intact, full composite fist possible, and no clicking and locking  Neurovascular status: Neuro intact, good cap refill  Activity Restrictions: No restrictions  Done today: Sutures out      STUDIES REVIEWED:  No Studies to review      PROCEDURES PERFORMED:  Procedures  No Procedures performed today    "

## 2025-02-21 ENCOUNTER — APPOINTMENT (OUTPATIENT)
Dept: LAB | Facility: HOSPITAL | Age: 70
End: 2025-02-21
Payer: MEDICARE

## 2025-02-21 ENCOUNTER — RESULTS FOLLOW-UP (OUTPATIENT)
Dept: FAMILY MEDICINE CLINIC | Facility: CLINIC | Age: 70
End: 2025-02-21

## 2025-02-21 DIAGNOSIS — Z13.89 SCREENING FOR CARDIOVASCULAR, RESPIRATORY, AND GENITOURINARY DISEASES: ICD-10-CM

## 2025-02-21 DIAGNOSIS — R53.82 CHRONIC FATIGUE: ICD-10-CM

## 2025-02-21 DIAGNOSIS — Z79.899 ON LONG TERM DRUG THERAPY: ICD-10-CM

## 2025-02-21 DIAGNOSIS — Z13.6 SCREENING FOR CARDIOVASCULAR, RESPIRATORY, AND GENITOURINARY DISEASES: ICD-10-CM

## 2025-02-21 DIAGNOSIS — E55.9 VITAMIN D DEFICIENCY: ICD-10-CM

## 2025-02-21 DIAGNOSIS — Z13.83 SCREENING FOR CARDIOVASCULAR, RESPIRATORY, AND GENITOURINARY DISEASES: ICD-10-CM

## 2025-02-21 DIAGNOSIS — E78.2 MIXED HYPERLIPIDEMIA: ICD-10-CM

## 2025-02-21 LAB
25(OH)D3 SERPL-MCNC: 36.1 NG/ML (ref 30–100)
ALBUMIN SERPL BCG-MCNC: 4.5 G/DL (ref 3.5–5)
ALP SERPL-CCNC: 47 U/L (ref 34–104)
ALT SERPL W P-5'-P-CCNC: 24 U/L (ref 7–52)
ANION GAP SERPL CALCULATED.3IONS-SCNC: 9 MMOL/L (ref 4–13)
AST SERPL W P-5'-P-CCNC: 21 U/L (ref 13–39)
BILIRUB SERPL-MCNC: 0.93 MG/DL (ref 0.2–1)
BUN SERPL-MCNC: 16 MG/DL (ref 5–25)
CALCIUM SERPL-MCNC: 9.3 MG/DL (ref 8.4–10.2)
CHLORIDE SERPL-SCNC: 101 MMOL/L (ref 96–108)
CHOLEST SERPL-MCNC: 181 MG/DL (ref ?–200)
CO2 SERPL-SCNC: 30 MMOL/L (ref 21–32)
CREAT SERPL-MCNC: 1.2 MG/DL (ref 0.6–1.3)
ERYTHROCYTE [DISTWIDTH] IN BLOOD BY AUTOMATED COUNT: 11.7 % (ref 11.6–15.1)
EST. AVERAGE GLUCOSE BLD GHB EST-MCNC: 123 MG/DL
GFR SERPL CREATININE-BSD FRML MDRD: 60 ML/MIN/1.73SQ M
GLUCOSE P FAST SERPL-MCNC: 107 MG/DL (ref 65–99)
HBA1C MFR BLD: 5.9 %
HCT VFR BLD AUTO: 45.5 % (ref 36.5–49.3)
HDLC SERPL-MCNC: 80 MG/DL
HGB BLD-MCNC: 15 G/DL (ref 12–17)
LDLC SERPL CALC-MCNC: 77 MG/DL (ref 0–100)
MCH RBC QN AUTO: 29.9 PG (ref 26.8–34.3)
MCHC RBC AUTO-ENTMCNC: 33 G/DL (ref 31.4–37.4)
MCV RBC AUTO: 91 FL (ref 82–98)
NONHDLC SERPL-MCNC: 101 MG/DL
PLATELET # BLD AUTO: 256 THOUSANDS/UL (ref 149–390)
PMV BLD AUTO: 8.8 FL (ref 8.9–12.7)
POTASSIUM SERPL-SCNC: 4.1 MMOL/L (ref 3.5–5.3)
PROT SERPL-MCNC: 7.2 G/DL (ref 6.4–8.4)
RBC # BLD AUTO: 5.02 MILLION/UL (ref 3.88–5.62)
SODIUM SERPL-SCNC: 140 MMOL/L (ref 135–147)
TRIGL SERPL-MCNC: 118 MG/DL (ref ?–150)
TSH SERPL DL<=0.05 MIU/L-ACNC: 1.82 UIU/ML (ref 0.45–4.5)
WBC # BLD AUTO: 5.81 THOUSAND/UL (ref 4.31–10.16)

## 2025-02-21 PROCEDURE — 84443 ASSAY THYROID STIM HORMONE: CPT

## 2025-02-21 PROCEDURE — 82306 VITAMIN D 25 HYDROXY: CPT

## 2025-02-21 PROCEDURE — 83036 HEMOGLOBIN GLYCOSYLATED A1C: CPT

## 2025-02-21 PROCEDURE — 80061 LIPID PANEL: CPT

## 2025-02-21 PROCEDURE — 85027 COMPLETE CBC AUTOMATED: CPT

## 2025-02-21 PROCEDURE — 36415 COLL VENOUS BLD VENIPUNCTURE: CPT

## 2025-02-21 PROCEDURE — 80053 COMPREHEN METABOLIC PANEL: CPT

## 2025-03-30 DIAGNOSIS — I72.8 PSEUDOANEURYSM OF INTRA-ABDOMINAL ARTERY (HCC): Primary | ICD-10-CM

## 2025-03-30 DIAGNOSIS — I99.8 SEGMENTAL ARTERIAL MEDIOLYSIS: ICD-10-CM

## 2025-04-06 NOTE — DISCHARGE INSTRUCTIONS
[facility] ED NOTE    Patient : Santos Frausto Age: 64 year old Sex: male   MRN: [id number] Encounter Date: 4/6/2025    History of Present Illness      Chief Complaint   Patient presents with    Abdominal Pain       Abdominal Pain  The primary symptoms of the illness include abdominal pain.     Patient is accompanied by his wife who is aiding history.   used to communicate with them.  64 year old male with a history of hyperlipidemia presents to ED complaining of generalized abdominal pain for 2 weeks. Pain is constant. Pain is described as \"inflamed\" and bloated. Denies aggravating or alleviating factors. Denies nausea vomiting or diarrhea. Denies blood or mucus in stool. Denies dysuria, hematuria, or fever.  Denies chest pain.  For the past 1 month, patient has had intermittent episodes where he is \"gasping\" for air in the middle of the night.  Patient saw his PCP for this issue and was referred to cardiology.    On March 20, patient saw his PCP for left knee pain.  Patient had x-rays done, was told he had arthritis of the knee, and was referred to an orthopedic specialist.  Patient was prescribed a Medrol Dosepak and meloxicam.  Patient is unsure if his abdominal pain started before or after the new medications.  Denies cough, unintentional weight loss, night sweats.    Past Medical History     No Known Allergies    Past Medical History:   Diagnosis Date    High cholesterol        No past surgical history on file.    No family history on file.    Social History     Tobacco Use    Smoking status: Never    Smokeless tobacco: Never       Physical Exam     ED Triage Vitals [04/06/25 0724]   ED Triage Vitals Group      Temp 96.8 °F (36 °C)      Heart Rate 70      Resp 16      /80      SpO2 95 %      EtCO2 mmHg       Height 5' 7\" (1.702 m)      Weight 178 lb 2.5 oz (80.8 kg)      Weight Scale Used Standing scale      BMI (Calculated) 27.9      IBW/kg (Calculated) 66.1       Pulse Ox is  Epidural Steroid Injection   WHAT YOU NEED TO KNOW:   An epidural steroid injection (MONE) is a procedure to inject steroid medicine into the epidural space  The epidural space is between your spinal cord and vertebrae  Steroids reduce inflammation and fluid buildup in your spine that may be causing pain  You may be given pain medicine along with the steroids  ACTIVITY  · Do not drive or operate machinery today  · No strenuous activity today - bending, lifting, etc   · You may resume normal activites starting tomorrow - start slowly and as tolerated  · You may shower today, but no tub baths or hot tubs  · You may have numbness for several hours from the local anesthetic  Please use caution and common sense, especially with weight-bearing activities  CARE OF THE INJECTION SITE  · If you have soreness or pain, apply ice to the area today (20 minutes on/20 minutes off)  · Starting tomorrow, you may use warm, moist heat or ice if needed  · You may have an increase or change in your discomfort for 36-48 hours after your treatment  · Apply ice and continue with any pain medication you have been prescribed  · Notify [facility] if you have any of the following: redness, drainage, swelling, headache, stiff neck or fever above 100°F     SPECIAL INSTRUCTIONS  · Our office will contact you in approximately 7 days for a progress report  MEDICATIONS  · Continue to take all routine medications  · Our office may have instructed you to hold some medications  If you have a problem specifically related to your procedure, please call our office at [phone]  Problems not related to your procedure should be directed to your primary care physician  [95 %] on Room Air which is normal for this patient    Physical Exam  Vitals reviewed.   Constitutional:       General: He is awake. He is not in acute distress.  Cardiovascular:      Rate and Rhythm: Normal rate and regular rhythm.      Heart sounds: Normal heart sounds.   Pulmonary:      Effort: Pulmonary effort is normal.      Breath sounds: Normal breath sounds and air entry.   Abdominal:      General: Abdomen is flat.      Palpations: Abdomen is soft.      Tenderness: There is generalized abdominal tenderness (mild, most pronounced in the epigastrium). There is no right CVA tenderness, left CVA tenderness, guarding or rebound.   Musculoskeletal:      Left knee: No swelling. Normal range of motion. No tenderness.      Comments: Lumbar spine and sciatic notches are nontender to palpation   Neurological:      General: No focal deficit present.      Mental Status: He is alert.   Psychiatric:         Speech: Speech normal.         Behavior: Behavior normal.         Procedures    ED Medication Orders (From admission, onward)      Ordered Start     Status Ordering Provider    04/06/25 0753 04/06/25 0800  pantoprazole (PROTONIX INJECT) injection 40 mg  ONCE         Last MAR action: Given JAMILAH DEUTSCH            Vitals:    04/06/25 0915 04/06/25 0930 04/06/25 0945 04/06/25 1015   BP: 114/64 129/79 120/75 120/80   BP Location: LUE - Left upper extremity LUE - Left upper extremity LUE - Left upper extremity LUE - Left upper extremity   Pulse: (!) 59 61 (!) 58 (!) 57   Resp: 14 14 15 15   Temp:       SpO2: 96% 95% 94% 95%   Weight:       Height:           Lab Results     Results for orders placed or performed during the hospital encounter of 04/06/25   TROPONIN I, HIGH SENSITIVITY    Specimen: Blood, Venous   Result Value Ref Range    Troponin I, High Sensitivity 5 <77 ng/L   Comprehensive Metabolic Panel    Specimen: Blood, Venous   Result Value Ref Range    Fasting Status      Sodium 138 135 - 145 mmol/L     Potassium 4.2 3.4 - 5.1 mmol/L    Chloride 102 97 - 110 mmol/L    Carbon Dioxide 29 21 - 32 mmol/L    Anion Gap 11 7 - 19 mmol/L    Glucose 100 (H) 70 - 99 mg/dL    BUN 16 6 - 20 mg/dL    Creatinine 0.85 0.67 - 1.17 mg/dL    Glomerular Filtration Rate >90 >=60    BUN/Cr 19 7 - 25    Calcium 8.3 (L) 8.4 - 10.2 mg/dL    Bilirubin, Total 0.5 0.2 - 1.0 mg/dL    GOT/AST 14 <=37 Units/L    GPT/ALT 32 <64 Units/L    Alkaline Phosphatase 73 45 - 117 Units/L    Albumin 2.7 (L) 3.4 - 5.0 g/dL    Protein, Total 6.9 6.4 - 8.2 g/dL    Globulin 4.2 (H) 2.0 - 4.0 g/dL    A/G Ratio 0.6 (L) 1.0 - 2.4   Lipase    Specimen: Blood, Venous   Result Value Ref Range    Lipase 47 15 - 77 Units/L   CBC with Automated Differential (performable only)    Specimen: Blood, Venous   Result Value Ref Range    WBC 7.9 4.2 - 11.0 K/mcL    RBC 4.28 (L) 4.50 - 5.90 mil/mcL    HGB 12.6 (L) 13.0 - 17.0 g/dL    HCT 39.1 39.0 - 51.0 %    MCV 91.4 78.0 - 100.0 fl    MCH 29.4 26.0 - 34.0 pg    MCHC 32.2 32.0 - 36.5 g/dL    RDW-CV 12.7 11.0 - 15.0 %    RDW-SD 42.1 39.0 - 50.0 fL     140 - 450 K/mcL    NRBC 0 <=0 /100 WBC    Neutrophil, Percent 71 %    Lymphocytes, Percent 17 %    Mono, Percent 8 %    Eosinophils, Percent 4 %    Basophils, Percent 0 %    Immature Granulocytes 0 %    Absolute Neutrophils 5.6 1.8 - 7.7 K/mcL    Absolute Lymphocytes 1.3 1.0 - 4.0 K/mcL    Absolute Monocytes 0.6 0.3 - 0.9 K/mcL    Absolute Eosinophils  0.3 0.0 - 0.5 K/mcL    Absolute Basophils 0.0 0.0 - 0.3 K/mcL    Absolute Immature Granulocytes 0.0 0.0 - 0.2 K/mcL       EKG Results     EKG Interpretation  Rate: 61  Rhythm: normal sinus rhythm   Abnormality: no  EKG tracing interpreted by ED physician    Rhythm Strip Interpretation  Rate: 61  Rhythm: normal sinus rhythm   No ST segment elevation  Rhythm strip interpreted by ED physician    Cardiac Monitor [NSR]    Radiology Results     Imaging Results              CT ABDOMEN PELVIS W CONTRAST - IV contrast only (Final  result)  Result time 04/06/25 09:39:52      Final result                   Impression:    New irregular peritoneal stranding when compared to prior CT performed on  December 18, 2015. Differential includes peritoneal carcinomatosis,  peritoneal mesothelioma, peritoneal TB, or peritonitis.    Electronically Signed by: LENO PETERSEN M.D.   Signed on: 4/6/2025 9:39 AM   Workstation ID: [id number]               Narrative:    PROCEDURE:  CT ABDOMEN PELVIS W CONTRAST    CLINICAL INDICATION: Generalized abdominal pain, most pronounced in the  epigastrium.    TECHNIQUE: Multidetector contrast-enhanced axial CT of the abdomen and  pelvis was obtained along with coronal and sagittal reformats.  Adjustment  of the mA and/or kV was done based on the patient's size.    CONTRAST: 75 mL of Omnipaque 350 was administered intravenously.    COMPARISON: CT dated December 18, 2015.    FINDINGS:  Trace perihepatic fluid. The liver, gallbladder, spleen, pancreas, adrenal  glands, and kidneys are unremarkable.  No gallbladder distention, wall  thickening, or pericholecystic fluid.  No gallstones.  No obstructing renal  stones or hydronephrosis.    New irregular peritoneal stranding. The small and large bowel demonstrates  no abnormal wall thickening or distention.  No pericolonic stranding.  No  appendicitis. Tiny fat-containing umbilical hernia. No ascites or free air.   No lymphadenopathy.    The bladder is unremarkable. Mild prostatomegaly. Small amount of free  fluid within the pelvis. No pelvic adenopathy.    No abdominal aortic aneurysm.    No acute osseous abnormality.    Trace subsegmental atelectasis and/or scarring at the lung bases. Heart  size is within normal limits. No pleural or pericardial effusion. Tiny  sliding hiatal hernia.                                       XR CHEST AP OR PA (Final result)  Result time 04/06/25 08:20:30      Final result                   Impression:      No acute intrathoracic  abnormality.        Electronically Signed by: LENO PETERSEN M.D.   Signed on: 4/6/2025 8:20 AM   Workstation ID: [id number]               Narrative:    EXAM: XR CHEST AP OR PA    CLINICAL INDICATION: SOB.    TECHNIQUE: AP erect chest radiograph.    COMPARISON: Chest radiograph dated April 18, 2024    FINDINGS:     No suspicious pulmonary infiltrates.  No pleural effusion or pneumothorax.   The cardiomediastinal silhouette is unremarkable.                                      Medical Decision Making      Medical Decision Making      64 year old male presents to ED complaining of generalized abdominal pain.  Differential diagnosis includes pancreatitis, appendicitis, diverticulitis, gastritis, cholecystitis.  EKG showing normal sinus rhythm.  Troponin normal.  Doubt ACS.  CBC with mild anemia, hemoglobin 12.6.  CMP and lipase unremarkable.  CT abdomen shows new irregular peritoneal stranding when compared to prior CT performed on December 18, 2015. Differential includes peritoneal carcinomatosis, peritoneal mesothelioma, peritoneal TB, or peritonitis.  Abdomen is soft and mildly tender.  No rebound or guarding.  No fever or leukocytosis.  Doubt peritonitis.  Recommend that patient be admitted for further evaluation.  Patient will be [place] [facility] [place].  Gastroenterology and Hem/Onc on consult.  Case discussed with collaborating physician, Dr. Gilman.  Patient was transferred to the floor in stable condition.    Clinical Impression     ED Diagnosis   1. Abdominal pain, generalized        2. Peritoneal lesion            Disposition        Admit 4/6/2025 11:23 AM  Telemetry Bed?: No  Admitting Physician: MELISSA ROE [[id number]]  Is this a telephone or verbal order?: This is a telephone order from the admitting physician      Ebony Schmid PA-C   4/6/2025 7:56 AM     New Prescriptions    No medications on file              Ebony Schmid PA-C  04/06/25 1201

## 2025-05-01 ENCOUNTER — OFFICE VISIT (OUTPATIENT)
Dept: PHYSICAL THERAPY | Facility: CLINIC | Age: 70
End: 2025-05-01
Attending: PHYSICAL THERAPIST
Payer: MEDICARE

## 2025-05-01 DIAGNOSIS — M25.812 IMPINGEMENT OF LEFT SHOULDER: Primary | ICD-10-CM

## 2025-05-01 PROCEDURE — 97161 PT EVAL LOW COMPLEX 20 MIN: CPT | Performed by: PHYSICAL THERAPIST

## 2025-05-01 NOTE — PROGRESS NOTES
PT Evaluation     Today's date: 2025  Patient name: Alex Arthur  : 1955  MRN: [id number]  Referring provider: HARINDER Pacheco DO  Dx:   Encounter Diagnosis     ICD-10-CM    1. Impingement of left shoulder  M25.812                        Assessment  Impairments: activity intolerance, impaired physical strength, lacks appropriate home exercise program, pain with function and poor posture   Symptom irritability: low    Assessment details: Alex Arthur is a 69 y.o. male presenting to outpatient physical therapy at [facility] with complaints of anterior L shoulder pain.   He presents with good L shoulder range of motion, decreased postural strength, pain with L shoulder ER, limited flexibility, decreased tolerance to activity and decreased functional mobility due to L shoulder impingement.  He would benefit from skilled PT services in order to address these deficits and reach maximum level of function.  He presented to me via direct access.   Barriers to therapy: None  Understanding of Dx/Px/POC: excellent     Prognosis: excellent    Goals  ST.  Independent with HEP in 2 weeks  2.  Good postural awareness in 2 weeks    LT.  Achieve FOTO score of 72/100 in 4 weeks   2.  Able to pull objects away from his body into L shoulder ER without shoulder pain in 4 weeks  3.  Strength B traps = 5/5 in 4 weeks      Plan  Patient would benefit from: skilled PT and PT eval  Planned modality interventions: cryotherapy  Other planned modality interventions: laser    Planned therapy interventions: flexibility, functional ROM exercises, home exercise program, manual therapy, strengthening, stretching, therapeutic activities and therapeutic exercise    Frequency: 2x week  Duration in weeks: 4  Plan of Care beginning date: 2025  Plan of Care expiration date: 2025  Treatment plan discussed with: patient        Subjective Evaluation    History of Present Illness  Mechanism of injury: Pt reports having L anterior  shoulder pain for a few weeks.  Most pain is with L shoulder ER.  No pain with golfing.  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           Not a recurrent problem   Quality of life: excellent    Patient Goals  Patient goals for therapy: increased strength, return to sport/leisure activities and decreased pain    Pain  Current pain ratin  At best pain ratin  At worst pain ratin  Quality: dull ache and sharp  Relieving factors: rest  Aggravating factors: lifting  Progression: no change    Social Support  Lives with: spouse    Employment status: not working  Hand dominance: right      Diagnostic Tests  No diagnostic tests performed  Treatments  No previous or current treatments        Objective     Static Posture     Shoulders  Rounded.    Postural Observations  Seated posture: poor  Standing posture: fair  Correction of posture: makes symptoms better      Observations   Left Shoulder   Negative for effusion.     Palpation   Left   No palpable tenderness to the biceps.     Tenderness     Left Shoulder   Tenderness in the AC joint and biceps tendon (proximal). No tenderness in the infraspinatus tendon, subscapularis tendon and supraspinatus tendon.     Neurological Testing     Sensation     Shoulder   Left Shoulder   Intact: light touch    Right Shoulder   Intact: Light touch    Active Range of Motion   Left Shoulder   Normal active range of motion    Right Shoulder   Normal active range of motion    Joint Play   Left Shoulder  Joints within functional limits are the anterior capsule, posterior capsule and inferior capsule.     Strength/Myotome Testing     Left Shoulder     Planes of Motion   Flexion: 5   Extension: 5   Abduction: 5   External rotation at 90°: 5 (Pain)   Internal rotation at 0°: 5     Isolated Muscles   Biceps: 5   Middle trapezius: 4     Right Shoulder   Normal muscle strength    Tests     Left Shoulder   Positive Hawkin's.   Negative empty can, Neer's and Speed's.            Precautions: None  CO-MORBIDITIES: None  To MD On: N/A  FOTO Completed On:     POC Expires Reeval for Medicare to be completed Unit LImit Auth Expiration Date PT/OT/STVisit Limit   5/31/25 By visit  10 N/A N/A BOMN    Completed On:                  TREATMENT DIARY  Auth Status DATE 5/1/25        N/A Visit # 1 - DA         Remain N/A        MANUAL THERAPY         [place] L biceps tendon proximal in supine 5'                                                     THERAPEUTIC EXERCISE HEP         Prone rows L 5/1 10        Prone mid traps L 5/1 10                                                                                                                                          NEUROMUSCULAR REEDUCATION                                                                                                                                                       THERAPEUTIC ACTIVITY                                                  GAIT TRAINING                                                  MODALITIES

## 2025-05-12 ENCOUNTER — OFFICE VISIT (OUTPATIENT)
Age: 70
End: 2025-05-12
Payer: MEDICARE

## 2025-05-12 VITALS
BODY MASS INDEX: 26.36 KG/M2 | HEIGHT: 69 IN | WEIGHT: 178 LBS | DIASTOLIC BLOOD PRESSURE: 84 MMHG | SYSTOLIC BLOOD PRESSURE: 138 MMHG | HEART RATE: 57 BPM | OXYGEN SATURATION: 99 %

## 2025-05-12 DIAGNOSIS — M54.50 LOWER BACK PAIN: Primary | ICD-10-CM

## 2025-05-12 PROCEDURE — 99204 OFFICE O/P NEW MOD 45 MIN: CPT | Performed by: NURSE PRACTITIONER

## 2025-05-12 NOTE — PROGRESS NOTES
Name: Alex Arthur      : 1955      MRN: [id number]  Encounter Provider: LAMONT Irizarry  Encounter Date: 2025   Encounter department: [facility] [place]  :  Assessment & Plan  Lower back pain  Patient seen in outpatient office today as a self-referral for further workup and evaluation of low back pain  Patient reports history of low back pain for 10+ years when he initially started to have the symptoms it radiated into his buttock, he states he received an MONE and his symptoms resolved for many years.  He reports over the past 6 months a gradual onset of low back pain without recent falls or traumas.  He states he develops low back pain after initially waking up which goes across his low back without radiation.  He states after he does 3 stretches that he learned at physical therapy it resolves.  He states he is able to use the elliptical.  He did report about a week and a half ago when he was on vacation he did a lot of walking and noted that his legs felt heavy but he was still able to walk.  He saw physical therapy once and continues to do stretches and exercises at home which do help.  He has not seen pain management for over 10 years or received any injections recently.    Imaging:    MRI lumbar spine from :demonstrated mild diffuse developmental spinal canal narrowing with superimposed spondylitic degenerative changes with degenerative changes resulting in moderate canal stenosis at L4-5 with mild multilevel central and foraminal narrowing elsewhere.    Plan:  Reviewed prior imaging with patient  Given his symptoms improve after stretches with no red flag signs on exam.  Recommend patient exhaust all nonsurgical options with physical therapy and pain management  Placed referral to pain management for possible injections  Patient will follow-up as needed or if symptoms worsen or persist after trialing conservative management  If this happens we will  order MRI lumbar spine as well as lumbar flexion/extension x-rays and patient will follow-up as joint appointment with spine surgeon  Patient made aware to seek care sooner if he develops any new or worsening neurological change or red flag signs  Patient made aware to contact neurosurgery with any further questions or concerns.    Orders:    Ambulatory referral to Spine & Pain Management; Future        History of Present Illness     Alex Arthur is a 69 y.o. male with past medical history significant for piriformis syndrome, hypercholesterolemia, prostate cancer, pseudoaneurysm of intra-abdominal artery, GERD, and Warren's esophagus.  Patient seen in outpatient office today as a self-referral for further workup and evaluation of low back pain.    Patient states he has history of low back pain for 10+ years now when it initially started to radiated into his buttocks.  He states he received an MONE at that time and his symptoms resolved for many years.  He states over the last 6 months it was a gradual onset of low back pain without recent falls or traumas.  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  He does play golf.    He reports initially waking up in the morning and getting up he has roughly 5-6/10 low back pain across his low back without radiation.  He states after he performs 3 stretches that he learned at physical therapy it resolves.  He does also use the elliptical.  He states if he does a lot of walking or overdoing it he takes Tylenol which is rare.  He states about a week and a half ago he was on vacation they did a lot of walking and he felt like his legs felt heavy but he was still able to walk and do what he wanted.  He states he also gets some mild low back pain after golfing.  He also reports ongoing occasional urinary dribbling since prostate surgery.  He denies any difficulty with his balance.  He states he saw physical therapy once and continues to do  stretches and exercises at home.  He states he has not seen pain management for over 10 years or received any injections.    Did review prior MRI from 2022 which demonstrated mild diffuse developmental spinal canal narrowing with superimposed spondylitic degenerative changes with degenerative changes resulting in moderate canal stenosis at L4-5 with mild multilevel central and foraminal narrowing elsewhere.    Given patient's symptoms resolve after stretches and no red flag signs on exam.  Recommend patient exhaust all nonsurgical options with physical therapy and pain management.  Placed referral to pain management for possible injections.  Patient will follow-up as needed or if symptoms persist or worsen.    HPI     Review of Systems   Musculoskeletal:  Positive for back pain (LBP when awakening, goes away after the day progresses).   Neurological:  Negative for numbness.   PT 3 months  MONE Dr. Mohamud  - over 10 years ago    ROS reviewed with patient and agree and changes were made as needed    Past Medical History   Past Medical History:   Diagnosis Date    Abnormal ECG     Acute blood loss anemia (ABLA) 06/07/2024    Arrhythmia 2015    Symptomatic PACs    Warren esophagus 2019    Cancer (HCC) 2007    Prostate    Colon polyp     GERD (gastroesophageal reflux disease)     Hypercholesteremia     Irregular heart beat     PAC    Low back pain 2/20/2022    Lumbar radiculitis     OA (osteoarthritis)     Prostate CA (HCC)     Pyriformis syndrome     Shoulder impingement, left     Stomach disorder 2020     Past Surgical History:   Procedure Laterality Date    CHOLECYSTECTOMY      COLONOSCOPY      EGD      EGD AND COLONOSCOPY      IR MESENTERIC/VISCERAL ANGIOGRAM  05/29/2024    KNEE ARTHROSCOPY Bilateral     KNEE SURGERY Left     benign tumor removed     PERIPHERAL ANGIOGRAM  May 28    NE LAPAROSCOPY SURG CHOLECYSTECTOMY N/A 10/09/2020    Procedure: LAPAROSCOPIC CHOLECYSTECTOMY, umbilical hernia repair;  Surgeon: Yo  "MD Autumn;  Location: [facility];  Service: General    NH TENDON SHEATH INCISION Left 2025    Procedure: Left long trigger finger release;  Surgeon: Taj Ivy MD;  Location: [facility];  Service: Orthopedics    PROSTATE SURGERY          UPPER GASTROINTESTINAL ENDOSCOPY  2019, 2020     Family History   Problem Relation Age of Onset    Lymphoma Mother     Cancer Mother         Non Hodgkins Lymphoma    Lung cancer Father     Cancer Father         Lung Cancer smoker    Heart disease Brother     Other Brother         multisystem atrophy    Colon cancer Maternal Grandmother          at age 79    Cancer Brother         Mult System Atrophy    Heart disease Brother      he reports that he has never smoked. He has never used smokeless tobacco. He reports current alcohol use of about 1.0 standard drink of alcohol per week. He reports that he does not use drugs.  Current Outpatient Medications   Medication Instructions    acetaminophen (TYLENOL) 650 mg, Oral, Every 8 hours PRN    Dialyvite Vitamin D 5000 5,000 Units, Daily    flecainide (TAMBOCOR) 50 mg, Oral, Daily    ipratropium (ATROVENT) 0.03 % nasal spray 2 sprays, Nasal, 2 times daily PRN    Multiple Vitamins-Minerals (CENTRUM SILVER 50+MEN PO)     Naproxen Sodium 220 mg, Oral, 2 times daily    omeprazole (PRILOSEC) 20 mg, Oral, Daily    oxazepam (SERAX) 10 mg, Oral, Daily at bedtime PRN    psyllium (METAMUCIL) packet 1 packet, Oral, Daily    rosuvastatin (CRESTOR) 10 mg, Oral, Daily    sildenafil (VIAGRA) 100 mg, Oral, Daily PRN    traMADol (ULTRAM) 50 mg, Oral, Every 6 hours PRN   No Known Allergies   Objective   /84 (BP Location: Right arm, Patient Position: Sitting, Cuff Size: Standard)   Pulse 57   Ht 5' 9\" (1.753 m)   Wt 80.7 kg (178 lb)   SpO2 99%   BMI 26.29 kg/m²     Physical Exam  Vitals reviewed.   Constitutional:       General: He is not in acute distress.     Appearance: Normal appearance. He is not ill-appearing. "   HENT:      Head: Normocephalic and atraumatic.   Eyes:      Extraocular Movements: Extraocular movements intact.      Conjunctiva/sclera: Conjunctivae normal.      Pupils: Pupils are equal, round, and reactive to light.   Cardiovascular:      Rate and Rhythm: Normal rate.   Pulmonary:      Effort: Pulmonary effort is normal. No respiratory distress.   Chest:      Chest wall: No tenderness.   Abdominal:      General: There is no distension.      Palpations: Abdomen is soft.      Tenderness: There is no abdominal tenderness.   Musculoskeletal:         General: Normal range of motion.      Cervical back: Normal range of motion and neck supple.      Thoracic back: No tenderness.      Lumbar back: No tenderness.   Skin:     General: Skin is warm and dry.   Neurological:      Mental Status: He is oriented to person, place, and time.      Motor: Motor strength is normal.     Deep Tendon Reflexes:      Reflex Scores:       Bicep reflexes are 1+ on the right side and 1+ on the left side.       Patellar reflexes are 1+ on the right side and 1+ on the left side.  Psychiatric:         Attention and Perception: Attention and perception normal.         Mood and Affect: Mood and affect normal.         Speech: Speech normal.         Behavior: Behavior normal. Behavior is cooperative.         Thought Content: Thought content normal.         Cognition and Memory: Cognition and memory normal.         Judgment: Judgment normal.       Neurological Exam  Mental Status  Awake, alert and oriented to person, place and time. Oriented to person, place, and time. Memory is normal. Speech is normal.    Cranial Nerves  CN III, IV, VI: Extraocular movements intact bilaterally. Pupils equal round and reactive to light bilaterally.  CN V: Facial sensation is normal.  CN VII: Full and symmetric facial movement.  CN VIII: Hearing is normal.  CN XI: Shoulder shrug strength is normal.  CN XII: Tongue midline without atrophy or  fasciculations.    Motor  Normal muscle bulk throughout. Normal muscle tone. Strength is 5/5 throughout all four extremities.  Strength 5/5 throughout.    Sensory  Light touch is normal in upper and lower extremities.     Reflexes                                            Right                      Left  Biceps                                 1+                         1+  Patellar                                1+                         1+    Right pathological reflexes: Gerard's absent. Ankle clonus absent.  Left pathological reflexes: Gerard's absent. Ankle clonus absent.    Coordination  Right: Finger-to-nose normal.Left: Finger-to-nose normal.    Gait  Casual gait is normal including stance, stride, and arm swing.      Radiology Results Review: I personally reviewed the following image studies in PACS and associated radiology reports: MRI spine. My interpretation of the radiology images/reports is:  .

## 2025-05-12 NOTE — ASSESSMENT & PLAN NOTE
Patient seen in outpatient office today as a self-referral for further workup and evaluation of low back pain  Patient reports history of low back pain for 10+ years when he initially started to have the symptoms it radiated into his buttock, he states he received an MONE and his symptoms resolved for many years.  He reports over the past 6 months a gradual onset of low back pain without recent falls or traumas.  He states he develops low back pain after initially waking up which goes across his low back without radiation.  He states after he does 3 stretches that he learned at physical therapy it resolves.  He states he is able to use the elliptical.  He did report about a week and a half ago when he was on vacation he did a lot of walking and noted that his legs felt heavy but he was still able to walk.  He saw physical therapy once and continues to do stretches and exercises at home which do help.  He has not seen pain management for over 10 years or received any injections recently.    Imaging:    MRI lumbar spine from 2022:demonstrated mild diffuse developmental spinal canal narrowing with superimposed spondylitic degenerative changes with degenerative changes resulting in moderate canal stenosis at L4-5 with mild multilevel central and foraminal narrowing elsewhere.    Plan:  Reviewed prior imaging with patient  Given his symptoms improve after stretches with no red flag signs on exam.  Recommend patient exhaust all nonsurgical options with physical therapy and pain management  Placed referral to pain management for possible injections  Patient will follow-up as needed or if symptoms worsen or persist after trialing conservative management  If this happens we will order MRI lumbar spine as well as lumbar flexion/extension x-rays and patient will follow-up as joint appointment with spine surgeon  Patient made aware to seek care sooner if he develops any new or worsening neurological change or red flag  signs  Patient made aware to contact neurosurgery with any further questions or concerns.    Orders:    Ambulatory referral to Spine & Pain Management; Future

## 2025-05-18 DIAGNOSIS — E78.2 MIXED HYPERLIPIDEMIA: ICD-10-CM

## 2025-05-18 RX ORDER — ROSUVASTATIN CALCIUM 10 MG/1
10 TABLET, COATED ORAL DAILY
Qty: 90 TABLET | Refills: 1 | Status: SHIPPED | OUTPATIENT
Start: 2025-05-18

## 2025-05-20 ENCOUNTER — CONSULT (OUTPATIENT)
Dept: PAIN MEDICINE | Facility: CLINIC | Age: 70
End: 2025-05-20
Attending: NURSE PRACTITIONER
Payer: MEDICARE

## 2025-05-20 VITALS — WEIGHT: 178 LBS | HEIGHT: 69 IN | HEART RATE: 69 BPM | BODY MASS INDEX: 26.36 KG/M2 | TEMPERATURE: 98 F

## 2025-05-20 DIAGNOSIS — M48.062 SPINAL STENOSIS OF LUMBAR REGION WITH NEUROGENIC CLAUDICATION: Primary | ICD-10-CM

## 2025-05-20 PROCEDURE — 99204 OFFICE O/P NEW MOD 45 MIN: CPT | Performed by: ANESTHESIOLOGY

## 2025-05-20 PROCEDURE — G2211 COMPLEX E/M VISIT ADD ON: HCPCS | Performed by: ANESTHESIOLOGY

## 2025-05-20 RX ORDER — PREDNISONE 10 MG/1
TABLET ORAL
Qty: 36 TABLET | Refills: 0 | Status: SHIPPED | OUTPATIENT
Start: 2025-05-20

## 2025-05-20 NOTE — PROGRESS NOTES
Assessment  1. 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  Reviewed MRI of the in great detail and he does have moderate stenosis at L4-5 which I believe is contribute to his symptoms as well as low lumbar facet arthropathy.    I discussed initially considering a lumbar epidural steroid injection address any inflammatory component of his pain.    This point in time we will start with a titrating dose of prednisone to address any inflammatory component of his pain.  He understands he should not take nonsteroidal anti-inflammatories till he is finished with the course of the oral steroids.  He has any problems or questions or give her office a call and follow-up in 2 weeks time.    If his pain worsens or plateaus and continues to interfere with his daily living activities at that point had entertain an interlaminar epidural steroid injection.    We also discussed diagnostic medial branch blockade as well as rhizotomy but will reevaluate if his pain is significant interfering with his daily living activities and the above treatment plan does not provide significant relief.    My impressions and treatment recommendations were discussed in detail with the patient who verbalized understanding and had no further questions.  Discharge instructions were provided. I personally saw and examined the patient and I agree with the above discussed plan of care.  This note is created using dictation transcription.  It may contain typographical errors, grammatical errors, improperly dictated words, background noise and other errors.      New Medications Ordered This Visit   Medications    predniSONE 10 mg tablet     Sig: Take according to titration schedule     Dispense:  36 tablet     Refill:  0     Referred By: LAMONT Irizarry   History of Present Illness    Alex Arthur is a 69 y.o. male seen 5  years ago for low back pain rating down his leg underwent an epidural steroid injection with excellent relief.  Recent MRIs reveals a solid disc herniation but moderate stenosis at L4-5.  His pain is moderate rates it a 6 out of 10 on the visual analog scale in the morning and eases off throughout the day mildly interfering with his daily living activities.  Is occasional described as sharp and achy but denies any weakness or loss of bowel bladder function.  Standing increases symptoms.      I have personally reviewed and/or updated the patient's past medical history, past surgical history, family history, social history, current medications, allergies, and vital signs today.     Review of Systems   Constitutional:  Negative for fever and unexpected weight change.   HENT:  Negative for trouble swallowing.    Eyes:  Negative for visual disturbance.   Respiratory:  Negative for shortness of breath and wheezing.    Cardiovascular:  Negative for chest pain and palpitations.   Gastrointestinal:  Negative for constipation, diarrhea, nausea and vomiting.   Endocrine: Negative for cold intolerance, heat intolerance and polydipsia.   Genitourinary:  Negative for difficulty urinating and frequency.   Musculoskeletal:  Positive for myalgias. Negative for arthralgias, gait problem and joint swelling.   Skin:  Negative for rash.   Neurological:  Negative for dizziness, seizures, syncope, weakness and headaches.   Hematological:  Does not bruise/bleed easily.   Psychiatric/Behavioral:  Negative for dysphoric mood.    All other systems reviewed and are negative.      Problem List[1]    Past Medical History:   Diagnosis Date    Abnormal ECG     Acute blood loss anemia (ABLA) 06/07/2024    Arrhythmia 2015    Symptomatic PACs    Warren esophagus 2019    Cancer (HCC) 2007    Prostate    Colon polyp     GERD (gastroesophageal reflux disease)     Hypercholesteremia     Irregular heart beat     PAC    Low back pain 2/20/2022    Lumbar  radiculitis     OA (osteoarthritis)     Prostate CA (HCC)     Pyriformis syndrome     Shoulder impingement, left     Stomach disorder        Past Surgical History:   Procedure Laterality Date    CHOLECYSTECTOMY      COLONOSCOPY      EGD      EGD AND COLONOSCOPY      IR MESENTERIC/VISCERAL ANGIOGRAM  2024    KNEE ARTHROSCOPY Bilateral     KNEE SURGERY Left     benign tumor removed     PERIPHERAL ANGIOGRAM  May 28    MA LAPAROSCOPY SURG CHOLECYSTECTOMY N/A 10/09/2020    Procedure: LAPAROSCOPIC CHOLECYSTECTOMY, umbilical hernia repair;  Surgeon: Yo Leyva MD;  Location: [facility];  Service: General    MA TENDON SHEATH INCISION Left 2025    Procedure: Left long trigger finger release;  Surgeon: Taj Ivy MD;  Location: [facility];  Service: Orthopedics    PROSTATE SURGERY          UPPER GASTROINTESTINAL ENDOSCOPY  2019, 2020       Family History   Problem Relation Age of Onset    Lymphoma Mother     Cancer Mother         Non Hodgkins Lymphoma    Lung cancer Father     Cancer Father         Lung Cancer smoker    Heart disease Brother     Other Brother         multisystem atrophy    Colon cancer Maternal Grandmother          at age 79    Cancer Brother         Mult System Atrophy    Heart disease Brother        Social History     Occupational History    Not on file   Tobacco Use    Smoking status: Never    Smokeless tobacco: Never   Vaping Use    Vaping status: Never Used   Substance and Sexual Activity    Alcohol use: Yes     Alcohol/week: 1.0 standard drink of alcohol     Types: 1 Glasses of wine per week    Drug use: Never    Sexual activity: Yes     Partners: Female     Birth control/protection: Post-menopausal, None       Current Outpatient Medications on File Prior to Visit   Medication Sig    acetaminophen (TYLENOL) 650 mg CR tablet Take 1 tablet (650 mg total) by mouth every 8 (eight) hours as needed for mild pain (Patient not taking: Reported on 2025)     "Cholecalciferol (Dialyvite Vitamin D 5000) 125 MCG (5000 UT) capsule Take 5,000 Units by mouth daily      flecainide (TAMBOCOR) 50 mg tablet Take 1 tablet (50 mg total) by mouth in the morning    ipratropium (ATROVENT) 0.03 % nasal spray 2 SPRAYS INTO EACH NOSTRIL 2 (TWO) TIMES A DAY AS NEEDED FOR RHINITIS (FOR RUNNY NOSE)    Multiple Vitamins-Minerals (CENTRUM SILVER 50+MEN PO)     Naproxen Sodium 220 MG CAPS Take 1 capsule (220 mg total) by mouth 2 (two) times a day    omeprazole (PriLOSEC) 20 mg delayed release capsule TAKE 1 CAPSULE BY MOUTH EVERY DAY    oxazepam (SERAX) 10 mg capsule Take 1 capsule (10 mg total) by mouth daily at bedtime as needed for sleep    psyllium (METAMUCIL) packet Take 1 packet by mouth daily    rosuvastatin (CRESTOR) 10 MG tablet TAKE 1 TABLET BY MOUTH EVERY DAY    sildenafil (VIAGRA) 100 mg tablet Take 1 tablet (100 mg total) by mouth daily as needed for erectile dysfunction    [DISCONTINUED] traMADol (Ultram) 50 mg tablet Take 1 tablet (50 mg total) by mouth every 6 (six) hours as needed for moderate pain (Patient not taking: No sig reported)     No current facility-administered medications on file prior to visit.       Allergies[2]    Physical Exam    Pulse 69   Temp 98 °F (36.7 °C)   Ht 5' 9\" (1.753 m)   Wt 80.7 kg (178 lb)   BMI 26.29 kg/m²     Constitutional: normal, well developed, well nourished, alert, in no distress and non-toxic and no overt pain behavior.  Eyes: anicteric  HEENT: grossly intact  Neck: supple, symmetric, trachea midline and no masses   Pulmonary:even and unlabored  Cardiovascular:No edema or pitting edema present  Skin:Normal without rashes or lesions and well hydrated  Psychiatric:Mood and affect appropriate  Neurologic:Cranial Nerves II-XII grossly intact  Musculoskeletal:normal, difficulty going from sitting to standing sitting position; no obvious skin lesions or erythema lumbar sacral spine; no tenderness to lumbar sacral spine; deep tendon reflexes " are diminished but symmetrical bilateral patellar and achilles; no focal motor deficit appreciated lower limbs; [negative bilateral straight leg raising].    Imaging  MRI LUMBAR SPINE WITHOUT CONTRAST @  3-30-22     INDICATION: M51.26: Other intervertebral disc displacement, lumbar region.   Left lower back pain.     COMPARISON:  Prior MRI February 14, 2020     TECHNIQUE:  Sagittal T1, sagittal T2, sagittal inversion recovery, axial T1 and axial T2, coronal T2.    IMAGE QUALITY:  Diagnostic     FINDINGS:     VERTEBRAL BODIES:  There are 5 lumbar type vertebral bodies.  Slight dextroscoliosis L2-3.  Modic type I degenerative marrow signal identified eccentric to the right at L4-5 and eccentric to the left at L5-S1.  Small superior endplate Schmorl's node L5.     SACRUM:  Normal signal within the sacrum. No evidence of insufficiency or stress fracture.     DISTAL CORD AND CONUS:  Normal size and signal within the distal cord and conus.     PARASPINAL SOFT TISSUES:  Paraspinal soft tissues are unremarkable.     LOWER THORACIC DISC SPACES:  Normal disc height and signal.  No disc herniation, canal stenosis or foraminal narrowing.     LUMBAR DISC SPACES:  There is mild developmental spinal canal narrowing diffusely.     L1-L2:  Mild facet hypertrophy.  No significant central or foraminal narrowing.     L2-L3:  Moderate facet hypertrophy identified.  There is mild annular bulging with small marginal osteophytes.  Mild central stenosis noted.  Foramina are patent.     L3-L4:  Moderate facet hypertrophy.  There is slight annular bulge and marginal osteophytes noted and minimal retrolisthesis of L3 on 4 unchanged from prior.  Mild central stenosis noted with minimal foraminal narrowing.     L4-L5:  Moderate to severe facet hypertrophy bilaterally right more so than left with ligamentous infolding and mild annular bulging combine to result in moderate central stenosis.  Prior described inferiorly extruded disc herniation  has resolved in the   interval.  Mild right foraminal narrowing noted.     L5-S1:  Mild annular bulge with marginal osteophytes and mild facet hypertrophy.  There is mild left lateral recess stenosis.  Marginal osteophytes result in mild foraminal narrowing bilaterally.  Findings are similar to the prior MRI.     IMPRESSION:     There is mild diffuse developmental spinal canal narrowing with superimposed spondylotic degenerative changes as described.  Prior described inferiorly extruded disc herniation L4-5 has resolved.     Degenerative changes are noted resulting in moderate central stenosis at L4-5 and mild multilevel central and foraminal narrowing elsewhere similar to the prior study.       I have personally reviewed pertinent films in PACS and my interpretation is moderate stenosis at L4-5 with lower lumbar facet hypertrophy.         [1]   Patient Active Problem List  Diagnosis    PAC (premature atrial contraction)    Pyriformis syndrome, unspecified laterality    Lumbar radiculitis    Displacement of lumbar intervertebral disc without myelopathy    Nausea    Burping    Family history of colon cancer    History of colon polyps    Pure hypercholesterolemia    Family history of coronary artery disease    Subacromial impingement of left shoulder    Malignant neoplasm of prostate (HCC)    Primary osteoarthritis of right knee    Encounter for immunization    Functional dyspepsia    Vitamin D insufficiency    PND (post-nasal drip)    Pseudoaneurysm of intra-abdominal artery (HCC)    Acute blood loss anemia (ABLA)    Segmental arterial mediolysis    Lower back pain   [2] No Known Allergies

## 2025-05-27 ENCOUNTER — OFFICE VISIT (OUTPATIENT)
Dept: OBGYN CLINIC | Facility: OTHER | Age: 70
End: 2025-05-27
Payer: MEDICARE

## 2025-05-27 VITALS — BODY MASS INDEX: 27.25 KG/M2 | HEIGHT: 69 IN | WEIGHT: 184 LBS

## 2025-05-27 DIAGNOSIS — M75.42 SUBACROMIAL IMPINGEMENT OF LEFT SHOULDER: Primary | ICD-10-CM

## 2025-05-27 PROCEDURE — 99214 OFFICE O/P EST MOD 30 MIN: CPT | Performed by: ORTHOPAEDIC SURGERY

## 2025-05-27 PROCEDURE — 20610 DRAIN/INJ JOINT/BURSA W/O US: CPT | Performed by: ORTHOPAEDIC SURGERY

## 2025-05-27 RX ORDER — BUPIVACAINE HYDROCHLORIDE 2.5 MG/ML
2 INJECTION, SOLUTION EPIDURAL; INFILTRATION; INTRACAUDAL; PERINEURAL
Status: COMPLETED | OUTPATIENT
Start: 2025-05-27 | End: 2025-05-27

## 2025-05-27 RX ORDER — BETAMETHASONE SODIUM PHOSPHATE AND BETAMETHASONE ACETATE 3; 3 MG/ML; MG/ML
6 INJECTION, SUSPENSION INTRA-ARTICULAR; INTRALESIONAL; INTRAMUSCULAR; SOFT TISSUE
Status: COMPLETED | OUTPATIENT
Start: 2025-05-27 | End: 2025-05-27

## 2025-05-27 RX ADMIN — BETAMETHASONE SODIUM PHOSPHATE AND BETAMETHASONE ACETATE 6 MG: 3; 3 INJECTION, SUSPENSION INTRA-ARTICULAR; INTRALESIONAL; INTRAMUSCULAR; SOFT TISSUE at 14:45

## 2025-05-27 RX ADMIN — BUPIVACAINE HYDROCHLORIDE 2 ML: 2.5 INJECTION, SOLUTION EPIDURAL; INFILTRATION; INTRACAUDAL; PERINEURAL at 14:45

## 2025-05-27 NOTE — ASSESSMENT & PLAN NOTE
The patient has an examination consistent with subacromial impingement syndrome of the left shoulder.  I have discussed with the patient the pathophysiology of this diagnosis and reviewed how the examination correlates with this diagnosis.  Treatment options were discussed at length and after discussing these treatment options, the patient elected for and received a subacromial injection of corticosteroid (as described in the procedure note) with instruction to return to the home exercise program that he developed with physical therapy, if another referral to therapy is required he will let us know.  We will reevaluate the patient in 6-8 weeks.  If the symptoms fail to improve with this treatment the patient would be indicated for further imaging in the form of an MRI scan of the shoulder.

## 2025-06-03 ENCOUNTER — TELEPHONE (OUTPATIENT)
Dept: PAIN MEDICINE | Facility: CLINIC | Age: 70
End: 2025-06-03

## 2025-06-03 DIAGNOSIS — M75.42 SUBACROMIAL IMPINGEMENT OF LEFT SHOULDER: Primary | ICD-10-CM

## 2025-06-03 NOTE — TELEPHONE ENCOUNTER
----- Message from Palmer GARCIA sent at 5/20/2025  3:20 PM EDT -----  Regarding: Call patient in 2 weeks (5-3-25)  Please call patient in two weeks to see how he is feeling from taking the Prednisone.Please call 5-3-25

## 2025-06-03 NOTE — TELEPHONE ENCOUNTER
S/w pt, stated that he has very little to no improvement in his pain s/p oral steroids. Pt stated that he would say he has about 15% improvement in his pain at this time. Denied any improvement while taking the steroids. Pt confirmed 6/12/25 LESI. Advised pt, the writer will make SL aware and this office will cb if there is anything additional. Pt verbalized understanding and appreciation.

## 2025-06-09 NOTE — TELEPHONE ENCOUNTER
Caller: Patient    Doctor: Dr. RESENDIZ    Reason for call: Patient having shoulder pain and might need surgery. Would like to cancel procedure for now and will call back to reschedule    Call back#:

## 2025-06-10 ENCOUNTER — EVALUATION (OUTPATIENT)
Dept: PHYSICAL THERAPY | Facility: OTHER | Age: 70
End: 2025-06-10
Attending: ORTHOPAEDIC SURGERY
Payer: MEDICARE

## 2025-06-10 DIAGNOSIS — M25.512 ACUTE PAIN OF LEFT SHOULDER: Primary | ICD-10-CM

## 2025-06-10 PROCEDURE — 97161 PT EVAL LOW COMPLEX 20 MIN: CPT | Performed by: PHYSICAL THERAPIST

## 2025-06-10 PROCEDURE — 97140 MANUAL THERAPY 1/> REGIONS: CPT | Performed by: PHYSICAL THERAPIST

## 2025-06-10 PROCEDURE — 97112 NEUROMUSCULAR REEDUCATION: CPT | Performed by: PHYSICAL THERAPIST

## 2025-06-10 NOTE — HOME EXERCISE EDUCATION
Program_ID:[id number]   Access Code: [email or web address]  URL: [email or web address]  Date: 06-  Prepared By: Fortino Schwarz    Program Notes      Exercises      - Seated Scapular Retraction - 5 x daily - 7 x weekly - 1 sets - 5 reps - 2 hold      - Standing Sleeper Stretch at Wall - 1 x daily - 7 x weekly - 1 sets - 4 reps - 20 hold      - Standing Shoulder Posterior Capsule Stretch - 1 x daily - 7 x weekly - 1 sets - 4 reps - 20 hold      - Supine Single Arm Shoulder Protraction - 1 x daily - 7 x weekly - 1 sets - 10 reps      - standing towel flexion slide at counter  - 1 x daily - 7 x weekly - 1 sets - 10 reps - 10 hold      - Sidelying Shoulder External Rotation - 1 x daily - 7 x weekly - 1 sets - 10 reps

## 2025-06-10 NOTE — PROGRESS NOTES
PT Evaluation     Today's date: 6/10/2025  Patient name: Alex Arthur  : 1955  MRN: [id number]  Referring provider: Norberto Hall*  Dx: No diagnosis found.               Assessment  Impairments: abnormal coordination, abnormal muscle firing, abnormal or restricted ROM, activity intolerance, impaired physical strength, lacks appropriate home exercise program, pain with function and poor body mechanics  Symptom irritability: moderate    Assessment details: Alex Arthur is a pleasant 69 y.o. male who presents with L shoulder started about 3 weeks ago.  He reported  he did try several exercises  scap retraction started and some pec stretces.  He did have an injection  helped for about 1 week.  At night sleeping well night. But taking tylenol. Pain with getting shower and dressing. Paitnet with FE with arm extension. Not playing golf at the moment to prevent futher aggrivation.   HEP issued and POC reviewed.     Understanding of Dx/Px/POC: good     Prognosis: good    Goals  Short Term:  Pt will report decreased levels of pain by at least 2 subjective ratings in 4 weeks  Pt will demonstrate improved ROM by at least 10 degrees in 4 weeks  Pt will demonstrate improved strength by 1/2 grade  Long Term:   Pt will be independent in their HEP in 8 weeks  Pt will be be pain free with IADL's  Pt will demonstrate improved FOTO, > 80         Plan  Patient would benefit from: skilled physical therapy  Planned modality interventions: thermotherapy: hydrocollator packs    Planned therapy interventions: activity modification, joint mobilization, manual therapy, motor coordination training, neuromuscular re-education, patient education, self care, therapeutic activities, therapeutic exercise, graded activity and home exercise program    Frequency: 2x week  Treatment plan discussed with: patient  Plan details: Prognosis above is given PT services 2x/week tapering to 1x/week over the next 3 months and home program  adherence.        Subjective Evaluation    Pain  At best pain ratin  At worst pain ratin        Objective     General Comments:      Shoulder Comments   Shoulder = joint mob =  decreased post inf glide  apprehension test= - biceps load=  -  NEER's= +mild pain   Pierce/Cecil test =  + Apley's scratch test= + pain    Empty can test=  -   Speed's test=   -danielito impingement test = +   belly press=  ER lag= -  H strength 4/5 ER 3+/5 limited secondary to pain test E rin scaphion 4/5  mild pain   Scapular exam: mild diskinsis poor scapular mobility at end range flextion.   PROM flex 155  abd:155 IR: 50 ER 80       Thoracic hypmobility some TTP L rhomboid              Precautions:       Manuals 6.10            Inf capsule  MJ            PROM L shoulder  MJ             PA mobilization T-S  MJ                          Neuro Re-Ed             Scap punches              S?L ER              TB ro/LPD              Wall walk                                                     Ther Ex             Pullies              Post capsule stretch sleeper stretch                                                                                            Ther Activity                                       Gait Training                                       Modalities

## 2025-06-16 ENCOUNTER — APPOINTMENT (OUTPATIENT)
Dept: PHYSICAL THERAPY | Facility: OTHER | Age: 70
End: 2025-06-16
Attending: ORTHOPAEDIC SURGERY
Payer: MEDICARE

## 2025-06-18 ENCOUNTER — OFFICE VISIT (OUTPATIENT)
Dept: PHYSICAL THERAPY | Facility: OTHER | Age: 70
End: 2025-06-18
Attending: ORTHOPAEDIC SURGERY
Payer: MEDICARE

## 2025-06-18 DIAGNOSIS — M25.512 ACUTE PAIN OF LEFT SHOULDER: Primary | ICD-10-CM

## 2025-06-18 PROCEDURE — 97140 MANUAL THERAPY 1/> REGIONS: CPT | Performed by: PHYSICAL THERAPIST

## 2025-06-18 PROCEDURE — 97112 NEUROMUSCULAR REEDUCATION: CPT | Performed by: PHYSICAL THERAPIST

## 2025-06-23 ENCOUNTER — OFFICE VISIT (OUTPATIENT)
Dept: PHYSICAL THERAPY | Facility: OTHER | Age: 70
End: 2025-06-23
Attending: ORTHOPAEDIC SURGERY
Payer: MEDICARE

## 2025-06-23 DIAGNOSIS — M25.512 ACUTE PAIN OF LEFT SHOULDER: Primary | ICD-10-CM

## 2025-06-23 PROCEDURE — 97112 NEUROMUSCULAR REEDUCATION: CPT | Performed by: PHYSICAL THERAPIST

## 2025-06-23 PROCEDURE — 97140 MANUAL THERAPY 1/> REGIONS: CPT | Performed by: PHYSICAL THERAPIST

## 2025-06-23 NOTE — PROGRESS NOTES
Daily Note     Today's date: 2025  Patient name: Alex Arthur  : 1955  MRN: [id number]  Referring provider: Norberto Hall*  Dx:   Encounter Diagnosis     ICD-10-CM    1. Acute pain of left shoulder  M25.512                      Subjective:       Objective: See treatment diary below      Assessment: Tolerated treatment well. Patient demonstrated fatigue post treatment      Plan: Continue per plan of care.      Precautions:       Manuals 6.10 6.18 6.23          Inf capsule  MJ MJ MJ           PROM L shoulder  MJ  MJ MJ           PA mobilization T-S  MJ  MJ MJ                        Neuro Re-Ed             Scap punches   #3 10x2  #3 10x2           TB ER              S?L ER   10x2  10x2           TB ro/LPD   Gtb 10x2  Gtb 10x2           Wall walk    10x2 otb           Towel slide with ER   10x2            Ball int owall  Green 20 each  Red 20each           Std row    NV           Eccentric load of FE    NV           Ther Ex             Pullies   3min  3min           Post capsule stretch sleeper stretch   10''x10  10''x110                                                                                         Ther Activity                                       Gait Training                                       Modalities

## 2025-06-30 ENCOUNTER — OFFICE VISIT (OUTPATIENT)
Dept: PHYSICAL THERAPY | Facility: OTHER | Age: 70
End: 2025-06-30
Attending: ORTHOPAEDIC SURGERY
Payer: MEDICARE

## 2025-06-30 DIAGNOSIS — M25.512 ACUTE PAIN OF LEFT SHOULDER: Primary | ICD-10-CM

## 2025-06-30 PROCEDURE — 97112 NEUROMUSCULAR REEDUCATION: CPT | Performed by: PHYSICAL THERAPIST

## 2025-06-30 PROCEDURE — 97140 MANUAL THERAPY 1/> REGIONS: CPT | Performed by: PHYSICAL THERAPIST

## 2025-06-30 NOTE — PROGRESS NOTES
Daily Note     Today's date: 2025  Patient name: Alex Arthur  : 1955  MRN: [id number]  Referring provider: Norberto Hall*  Dx:   Encounter Diagnosis     ICD-10-CM    1. Acute pain of left shoulder  M25.512                      Subjective: small improvements again this week. Able to chip and put.       Objective: See treatment diary below      Assessment: Tolerated treatment well. Patient demonstrated fatigue post treatment      Plan: Continue per plan of care.      Precautions:       Manuals 6.10 6.18 6.23 6.30         Inf capsule  MJ MJ MJ  MJ          PROM L shoulder  MJ  MJ MJ  MJ         PA mobilization T-S  MJ  MJ MJ  MJ                       Neuro Re-Ed             Scap punches   #3 10x2  #3 10x2  #3 10x32          TB ER              S?L ER   10x2  10x2  10x2          TB ro/LPD   Gtb 10x2  Gtb 10x2  Gtb 10x3          Wall walk    10x2 otb  10x2          Towel slide with ER   10x2   10x2          Ball int owall  Green 20 each  Red 20each  Red 20each          Std row    NV           Eccentric load of FE    NV           Side step with ER ISO    10x2 peach          Ther Ex             Pullies   3min  3min           Post capsule stretch sleeper stretch   10''x10  10''x110  10''x10          UBE     2/2                                                                           Ther Activity                                       Gait Training                                       Modalities

## 2025-07-08 ENCOUNTER — OFFICE VISIT (OUTPATIENT)
Dept: PHYSICAL THERAPY | Facility: OTHER | Age: 70
End: 2025-07-08
Attending: ORTHOPAEDIC SURGERY
Payer: MEDICARE

## 2025-07-08 DIAGNOSIS — M25.512 ACUTE PAIN OF LEFT SHOULDER: Primary | ICD-10-CM

## 2025-07-08 PROCEDURE — 97140 MANUAL THERAPY 1/> REGIONS: CPT | Performed by: PHYSICAL THERAPIST

## 2025-07-08 PROCEDURE — 97112 NEUROMUSCULAR REEDUCATION: CPT | Performed by: PHYSICAL THERAPIST

## 2025-07-08 NOTE — PROGRESS NOTES
Daily Note     Today's date: 2025  Patient name: Alex Arthur  : 1955  MRN: [id number]  Referring provider: Norberto Hall*  Dx: No diagnosis found.               Subjective: tolerated progression well able to bike without issue.       Objective: See treatment diary below      Assessment: Tolerated treatment well. Patient demonstrated fatigue post treatment      Plan: Continue per plan of care.      Precautions:       Manuals 6.10 6.18 6.23 6.30 7.8        Inf capsule stretch on side.  MJ MJ MJ  MJ  MJ         PROM L shoulder  MJ  MJ MJ  MJ MJ         PA mobilization T-S  MJ  MJ MJ  MJ  MJ         Laser     3min  3min ant shoulder (bicep) and deltoid.         Neuro Re-Ed             Scap punches   #3 10x2  #3 10x2  #3 10x32  #4 10x3         TB ER              S?L ER   10x2  10x2  10x2  10x2 #0         TB ro/LPD   Gtb 10x2  Gtb 10x2  Gtb 10x3  Btb 10x3         Wall walk    10x2 otb  10x2  10x2 otb                      Ball int owall  Green 20 each  Red 20each  Red 20each  Red each         Std row    NV           Eccentric load of Forward elvation R hand assist.    NV   #2 10x         Side step with ER ISO    10x2 peach  10x2 peach         Ther Ex             Pullies   3min  3min           Post capsule stretch sleeper stretch   10''x10  10''x110  10''x10  10''x10         UBE     2/2  3/3                                                                         Ther Activity                                       Gait Training                                       Modalities

## 2025-07-14 ENCOUNTER — OFFICE VISIT (OUTPATIENT)
Dept: PHYSICAL THERAPY | Facility: OTHER | Age: 70
End: 2025-07-14
Attending: ORTHOPAEDIC SURGERY
Payer: MEDICARE

## 2025-07-14 DIAGNOSIS — M25.512 ACUTE PAIN OF LEFT SHOULDER: Primary | ICD-10-CM

## 2025-07-14 PROCEDURE — 97112 NEUROMUSCULAR REEDUCATION: CPT | Performed by: PHYSICAL THERAPIST

## 2025-07-14 PROCEDURE — 97140 MANUAL THERAPY 1/> REGIONS: CPT | Performed by: PHYSICAL THERAPIST

## 2025-07-14 NOTE — PROGRESS NOTES
Daily Note     Today's date: 2025  Patient name: Alex Arthur  : 1955  MRN: [id number]  Referring provider: Norberto Hall*  Dx:   Encounter Diagnosis     ICD-10-CM    1. Acute pain of left shoulder  M25.512                      Subjective: Patient states he feels that his shoulder is coming along slowly.       Objective: See treatment diary below      Assessment: Patient performed progressions as listed without c/o pain; positive response to manual intervention.       Plan: Continue per plan of care.      Precautions:       Manuals 6.10 6.18 6.23 6.30 7.8        Inf capsule stretch on side.  MJ MJ MJ  MJ  MJ  KK       PROM L shoulder  MJ  MJ MJ  MJ MJ  KK       PA mobilization T-S  MJ  MJ MJ  MJ  MJ  KK       Laser     3min  3min ant shoulder (bicep) and deltoid.  3min ant shoulder (bicep) and deltoid       Neuro Re-Ed             Scap punches   #3 10x2  #3 10x2  #3 10x32  #4 10x3  4# 3x10       TB ER              SL ER   10x2  10x2  10x2  10x2 #0  1# 2x10       TB ro/LPD   Gtb 10x2  Gtb 10x2  Gtb 10x3  Btb 10x3  Btb 10x3        Wall walk    10x2 otb  10x2  10x2 otb  10x2 otb                     Ball int owall  Green 20 each  Red 20each  Red 20each  Red each  Red 30x ea.       Std row    NV           Eccentric load of Forward elvation R hand assist.    NV   #2 10x  2# 10x       Side step with ER ISO    10x2 peach  10x2 peach  10x2 peach        Ther Ex             Pullies   3min  3min           Post capsule stretch sleeper stretch   10''x10  10''x110  10''x10  10''x10  10''x10        UBE     2/2  3/3 3/3                                                                        Ther Activity                                       Gait Training                                       Modalities

## 2025-07-21 ENCOUNTER — OFFICE VISIT (OUTPATIENT)
Dept: CARDIOLOGY CLINIC | Facility: CLINIC | Age: 70
End: 2025-07-21
Payer: MEDICARE

## 2025-07-21 ENCOUNTER — OFFICE VISIT (OUTPATIENT)
Dept: PHYSICAL THERAPY | Facility: OTHER | Age: 70
End: 2025-07-21
Attending: ORTHOPAEDIC SURGERY
Payer: MEDICARE

## 2025-07-21 VITALS
HEIGHT: 69 IN | DIASTOLIC BLOOD PRESSURE: 80 MMHG | BODY MASS INDEX: 27.11 KG/M2 | SYSTOLIC BLOOD PRESSURE: 122 MMHG | WEIGHT: 183 LBS | HEART RATE: 59 BPM

## 2025-07-21 DIAGNOSIS — M25.512 ACUTE PAIN OF LEFT SHOULDER: Primary | ICD-10-CM

## 2025-07-21 DIAGNOSIS — I49.1 PAC (PREMATURE ATRIAL CONTRACTION): Primary | ICD-10-CM

## 2025-07-21 DIAGNOSIS — E78.5 DYSLIPIDEMIA: ICD-10-CM

## 2025-07-21 PROCEDURE — 97140 MANUAL THERAPY 1/> REGIONS: CPT | Performed by: PHYSICAL THERAPIST

## 2025-07-21 PROCEDURE — 93000 ELECTROCARDIOGRAM COMPLETE: CPT | Performed by: INTERNAL MEDICINE

## 2025-07-21 PROCEDURE — 97112 NEUROMUSCULAR REEDUCATION: CPT | Performed by: PHYSICAL THERAPIST

## 2025-07-21 PROCEDURE — 99214 OFFICE O/P EST MOD 30 MIN: CPT | Performed by: INTERNAL MEDICINE

## 2025-07-21 RX ORDER — ROSUVASTATIN CALCIUM 20 MG/1
20 TABLET, COATED ORAL 3 TIMES WEEKLY
Qty: 30 TABLET | Refills: 5 | Status: SHIPPED | OUTPATIENT
Start: 2025-07-21

## 2025-07-21 NOTE — PROGRESS NOTES
Daily Note     Today's date: 2025  Patient name: Alex Arthur  : 1955  MRN: [id number]  Referring provider: Norberto Hall*  Dx:   Encounter Diagnosis     ICD-10-CM    1. Acute pain of left shoulder  M25.512           Start Time: 855          Subjective: improvements with IADL's  no pain with dressing showering now. He has improved strength. Able to return to playing golf and bike riding. Stiff avoiding heavier IADL's lifting away form his body.     Objective: See treatment diary below      Assessment: Tolerated treatment well. Patient demonstrated fatigue post treatment      Plan: Continue per plan of care.      Precautions:       Manuals 6.10 6.18 6.23 6.30 7.8   7.21      Inf capsule stretch on side.  MJ MJ MJ  MJ  MJ  MJ MJ       PROM L shoulder  MJ  MJ MJ  MJ MJ  1M       PA mobilization T-S  MJ  MJ MJ  MJ  MJ  MJ        Laser     3min  3min ant shoulder (bicep) and deltoid.  3min ant shoulder (bicep) and deltoid       Neuro Re-Ed             Scap punches   #3 10x2  #3 10x2  #3 10x32  #4 10x3  4# 3x10       TB ER              SL ER   10x2  10x2  10x2  10x2 #0  1# 2x10       TB Row/LPD   Gtb 10x2  Gtb 10x2  Gtb 10x3  Btb 10x3  Btb 10x3        Wall walk    10x2 otb  10x2  10x2 otb  10x2 gtb        Pball TI       10x2        Ball int owall  Green 20 each  Red 20each  Red 20each  Red each  Red 30x ea.       Std row    NV           Eccentric load of Forward elvation R hand assist.    NV   #2 10x  2# 10x x2         Side step with ER ISO    10x2 peach  10x2 peach  10x2 peach        Ther Ex             Pullies   3min  3min           Post capsule stretch sleeper stretch   10''x10  10''x110  10''x10  10''x10  10''x10        UBE     2/2  3/3 3/3                                                                        Ther Activity                                       Gait Training                                       Modalities

## 2025-07-21 NOTE — PROGRESS NOTES
Cardiology             Alex WALTER Arthur  1955  [id number]              Assessment/Plan:    Elevated ASCVD risk: 10-year risk of ASCVD up to 14.8%, likely higher due to elevated calcium score at 516 on prior study.  He has been initiated and stabilized on rosuvastatin 10 mg daily.  His most recent LDL cholesterol is stable at 77 mg/dL.  Ideally his LDL cholesterol should be less than 70 mg/dL given his elevated calcium score.  He is agreeable to taking 40 mg of rosuvastatin 3 times weekly and continuing 20 mg on all the other days.  Will check LP(a) for further restratification.  To his aspirin.  He remains asymptomatic.  Encouraged continued heart healthy diet, exercise.  Sinus bradycardia with first-degree AV block and incomplete right bundle branch block on ECG, unchanged.        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  He had a calcium scoring about 10 years ago which was in the 50s.  Around 2015 he started on low-dose rosuvastatin.  He has been taking 5 mg 3 times weekly.  Subsequently, on 03/02/2021 repeat coronary artery calcium score was 516, with most of the calcium in the right coronary artery and right marginal branches.  Subsequent stress echocardiogram 03/02/2021 was within normal limits.  He had a very good functional capacity, walking 11 minutes on Adalberto protocol achieving 111% of maximal predicted heart rate with no chest pain or ischemic ECG/echocardiographic changes.     Prior lipid panel 07/20/2021 demonstrated LDL cholesterol 75 mg/dL, HDL 78 mg/dL, triglycerides 84 mg/dL.  His highest LDL level on our record was in 2017 at 115 mg/dL.     Subsequently, his rosuvastatin was increased to 10 mg daily.      He presents today for follow-up with no complaints.  He has no cardiac symptoms.  He continues to exercise regularly and feels well.               Vitals:  Vitals:    07/21/25 1303   BP: 122/80   Pulse: 59  "  Weight: 83 kg (183 lb)   Height: 5' 9\" (1.753 m)         Past Medical History:   Diagnosis Date   • Abnormal ECG    • Acute blood loss anemia (ABLA) 06/07/2024   • Arrhythmia 2015    Symptomatic PACs   • Warren esophagus 2019   • Cancer (HCC) 2007    Prostate   • Colon polyp    • GERD (gastroesophageal reflux disease)    • Hypercholesteremia    • Irregular heart beat     PAC   • Low back pain 2/20/2022   • Lumbar radiculitis    • OA (osteoarthritis)    • Prostate CA (HCC)    • Pyriformis syndrome    • Shoulder impingement, left    • Stomach disorder 2020     Social History     Socioeconomic History   • Marital status: /Civil Union     Spouse name: Not on file   • Number of children: Not on file   • Years of education: Not on file   • Highest education level: Not on file   Occupational History   • Not on file   Tobacco Use   • Smoking status: Never   • Smokeless tobacco: Never   Vaping Use   • Vaping status: Never Used   Substance and Sexual Activity   • Alcohol use: Yes     Alcohol/week: 1.0 standard drink of alcohol     Types: 1 Glasses of wine per week   • Drug use: Never   • Sexual activity: Yes     Partners: Female     Birth control/protection: Post-menopausal, None   Other Topics Concern   • Not on file   Social History Narrative    Do you currently or have you served in the [employer]:   No      Occupation:   Vascular Surgeon      Advance directive:   Yes      Social Drivers of Health     Financial Resource Strain: Not on file   Food Insecurity: No Food Insecurity (11/7/2024)    Nursing - Inadequate Food Risk Classification    • Worried About Running Out of Food in the Last Year: Never true    • Ran Out of Food in the Last Year: Never true    • Ran Out of Food in the Last Year: Not on file   Transportation Needs: No Transportation Needs (11/7/2024)    PRAPARE - Transportation    • Lack of Transportation (Medical): No    • Lack of Transportation (Non-Medical): No   Physical Activity: Not on file "   Stress: Not on file   Social Connections: Not on file   Intimate Partner Violence: Not on file   Housing Stability: Low Risk  (2024)    Housing Stability Vital Sign    • Unable to Pay for Housing in the Last Year: No    • Number of Times Moved in the Last Year: 0    • Homeless in the Last Year: No      Family History   Problem Relation Name Age of Onset   • Lymphoma Mother Sherrill    • Cancer Mother Sherrill         Non Hodgkins Lymphoma   • Lung cancer Father Miguel    • Cancer Father Miguel         Lung Cancer smoker   • Heart disease Brother Bret    • Other Brother Bret         multisystem atrophy   • Colon cancer Maternal Grandmother Isatu          at age 79   • Cancer Brother Mp Ching System Atrophy   • Heart disease Brother Kirk      Past Surgical History:   Procedure Laterality Date   • CHOLECYSTECTOMY     • COLONOSCOPY     • EGD     • EGD AND COLONOSCOPY     • IR MESENTERIC/VISCERAL ANGIOGRAM  2024   • KNEE ARTHROSCOPY Bilateral    • KNEE SURGERY Left     benign tumor removed    • PERIPHERAL ANGIOGRAM  May 28   • [place] LAPAROSCOPY SURG CHOLECYSTECTOMY N/A 10/09/2020    Procedure: LAPAROSCOPIC CHOLECYSTECTOMY, umbilical hernia repair;  Surgeon: Yo Leyva MD;  Location: [facility];  Service: General   • [place] TENDON SHEATH INCISION Left 2025    Procedure: Left long trigger finger release;  Surgeon: Taj Ivy MD;  Location: [facility];  Service: Orthopedics   • PROSTATE SURGERY         • UPPER GASTROINTESTINAL ENDOSCOPY  2019, 2020       Current Outpatient Medications:   •  acetaminophen (TYLENOL) 650 mg CR tablet, Take 1 tablet (650 mg total) by mouth every 8 (eight) hours as needed for mild pain, Disp: 30 tablet, Rfl: 0  •  Cholecalciferol (Dialyvite Vitamin D 5000) 125 MCG (5000 UT) capsule, Take 5,000 Units by mouth in the morning., Disp: , Rfl:   •  flecainide (TAMBOCOR) 50 mg tablet, Take 1 tablet (50 mg total) by mouth in the morning, Disp: , Rfl:   •   ipratropium (ATROVENT) 0.03 % nasal spray, 2 SPRAYS INTO EACH NOSTRIL 2 (TWO) TIMES A DAY AS NEEDED FOR RHINITIS (FOR RUNNY NOSE), Disp: 90 mL, Rfl: 3  •  Multiple Vitamins-Minerals (CENTRUM SILVER 50+MEN PO), , Disp: , Rfl:   •  omeprazole (PriLOSEC) 20 mg delayed release capsule, TAKE 1 CAPSULE BY MOUTH EVERY DAY, Disp: 90 capsule, Rfl: 4  •  oxazepam (SERAX) 10 mg capsule, Take 1 capsule (10 mg total) by mouth daily at bedtime as needed for sleep, Disp: 100 capsule, Rfl: 1  •  psyllium (METAMUCIL) packet, Take 1 packet by mouth daily, Disp: , Rfl:   •  rosuvastatin (CRESTOR) 10 MG tablet, TAKE 1 TABLET BY MOUTH EVERY DAY, Disp: 90 tablet, Rfl: 1  •  rosuvastatin (CRESTOR) 20 MG tablet, Take 1 tablet (20 mg total) by mouth 3 (three) times a week, Disp: 30 tablet, Rfl: 5  •  sildenafil (VIAGRA) 100 mg tablet, Take 1 tablet (100 mg total) by mouth daily as needed for erectile dysfunction, Disp: 20 tablet, Rfl: 3        Review of Systems:  Review of Systems   Respiratory: Negative.  Negative for chest tightness and shortness of breath.    Cardiovascular: Negative.    All other systems reviewed and are negative.        Physical Exam:  Physical Exam  Constitutional:       General: He is not in acute distress.     Appearance: He is well-developed. He is not diaphoretic.   HENT:      Head: Normocephalic and atraumatic.     Eyes:      General: No scleral icterus.        Right eye: No discharge.      Pupils: Pupils are equal, round, and reactive to light.     Neck:      Thyroid: No thyromegaly.     Cardiovascular:      Rate and Rhythm: Regular rhythm. Bradycardia present.      Heart sounds: Normal heart sounds. No murmur heard.     No friction rub. No gallop.   Pulmonary:      Effort: Pulmonary effort is normal.      Breath sounds: Normal breath sounds.   Abdominal:      General: There is no distension.      Tenderness: There is no abdominal tenderness. There is no guarding or rebound.     Musculoskeletal:         General:  Normal range of motion.      Cervical back: Normal range of motion and neck supple.      Right lower leg: No edema.      Left lower leg: No edema.     Skin:     General: Skin is warm and dry.      Coloration: Skin is not pale.      Findings: No erythema or rash.     Neurological:      Mental Status: He is alert and oriented to person, place, and time.      Coordination: Coordination normal.     Psychiatric:         Behavior: Behavior normal.         Thought Content: Thought content normal.         Judgment: Judgment normal.         This note was completed in part utilizing M-Modal Fluency Direct Software.  Grammatical errors, random word insertions, spelling mistakes, and incomplete sentences can be an occasional consequence of this system secondary to software limitations, ambient noise, and hardware issues.  If you have any questions or concerns about the content, text, or information contained within the body of this dictation, please contact the provider for clarification.

## 2025-07-28 ENCOUNTER — OFFICE VISIT (OUTPATIENT)
Dept: PHYSICAL THERAPY | Facility: OTHER | Age: 70
End: 2025-07-28
Attending: ORTHOPAEDIC SURGERY
Payer: MEDICARE

## 2025-07-28 DIAGNOSIS — M25.512 ACUTE PAIN OF LEFT SHOULDER: Primary | ICD-10-CM

## 2025-07-28 PROCEDURE — 97112 NEUROMUSCULAR REEDUCATION: CPT | Performed by: PHYSICAL THERAPIST

## 2025-07-28 PROCEDURE — 97140 MANUAL THERAPY 1/> REGIONS: CPT | Performed by: PHYSICAL THERAPIST

## 2025-07-31 DIAGNOSIS — Z86.59 HISTORY OF CLAUSTROPHOBIA: ICD-10-CM

## 2025-07-31 DIAGNOSIS — M24.812 INTERNAL DERANGEMENT OF SHOULDER, LEFT: Primary | ICD-10-CM

## 2025-07-31 PROCEDURE — 99214 OFFICE O/P EST MOD 30 MIN: CPT | Performed by: ORTHOPAEDIC SURGERY

## 2025-07-31 RX ORDER — LORAZEPAM 1 MG/1
1 TABLET ORAL
Qty: 3 TABLET | Refills: 0 | Status: SHIPPED | OUTPATIENT
Start: 2025-07-31

## 2025-08-04 ENCOUNTER — OFFICE VISIT (OUTPATIENT)
Dept: PHYSICAL THERAPY | Facility: OTHER | Age: 70
End: 2025-08-04
Attending: ORTHOPAEDIC SURGERY
Payer: MEDICARE

## 2025-08-04 DIAGNOSIS — M25.512 ACUTE PAIN OF LEFT SHOULDER: Primary | ICD-10-CM

## 2025-08-04 PROCEDURE — 97140 MANUAL THERAPY 1/> REGIONS: CPT | Performed by: PHYSICAL THERAPIST

## 2025-08-04 PROCEDURE — 97112 NEUROMUSCULAR REEDUCATION: CPT | Performed by: PHYSICAL THERAPIST

## 2025-08-11 ENCOUNTER — OFFICE VISIT (OUTPATIENT)
Dept: PHYSICAL THERAPY | Facility: OTHER | Age: 70
End: 2025-08-11
Attending: ORTHOPAEDIC SURGERY
Payer: MEDICARE

## 2025-08-18 ENCOUNTER — OFFICE VISIT (OUTPATIENT)
Dept: PHYSICAL THERAPY | Facility: OTHER | Age: 70
End: 2025-08-18
Attending: ORTHOPAEDIC SURGERY
Payer: MEDICARE

## 2025-08-18 DIAGNOSIS — M25.512 ACUTE PAIN OF LEFT SHOULDER: Primary | ICD-10-CM

## 2025-08-18 PROCEDURE — 97140 MANUAL THERAPY 1/> REGIONS: CPT | Performed by: PHYSICAL THERAPIST

## 2025-08-18 PROCEDURE — 97112 NEUROMUSCULAR REEDUCATION: CPT | Performed by: PHYSICAL THERAPIST

## (undated) DEVICE — INTENDED FOR TISSUE SEPARATION, AND OTHER PROCEDURES THAT REQUIRE A SHARP SURGICAL BLADE TO PUNCTURE OR CUT.: Brand: BARD-PARKER ® CARBON RIB-BACK BLADES

## (undated) DEVICE — PAD GROUNDING ADULT

## (undated) DEVICE — VIAL DECANTER

## (undated) DEVICE — PAD CAST 4 IN COTTON NON STERILE

## (undated) DEVICE — OCCLUSIVE GAUZE STRIP,3% BISMUTH TRIBROMOPHENATE IN PETROLATUM BLEND: Brand: XEROFORM

## (undated) DEVICE — GAUZE SPONGES,16 PLY: Brand: CURITY

## (undated) DEVICE — GLOVE INDICATOR PI UNDERGLOVE SZ 8 BLUE

## (undated) DEVICE — TROCAR: Brand: KII FIOS FIRST ENTRY

## (undated) DEVICE — TUBING SMOKE EVAC W/FILTRATION DEVICE PLUMEPORT ACTIV

## (undated) DEVICE — GLOVE SRG BIOGEL ECLIPSE 7

## (undated) DEVICE — NEEDLE 22 G X 1 1/2 SAFETY

## (undated) DEVICE — ADHESIVE SKIN HIGH VISCOSITY EXOFIN 1ML

## (undated) DEVICE — CHLORAPREP HI-LITE 26ML ORANGE

## (undated) DEVICE — ACE WRAP 3 IN UNSTERILE

## (undated) DEVICE — IRRIG ENDO FLO TUBING

## (undated) DEVICE — UNDYED BRAIDED (POLYGLACTIN 910), SYNTHETIC ABSORBABLE SUTURE: Brand: COATED VICRYL

## (undated) DEVICE — SUT PROLENE 4-0 PC-3 18 IN 8634G

## (undated) DEVICE — ALLENTOWN LAP CHOLE APP PACK: Brand: CARDINAL HEALTH

## (undated) DEVICE — ELECTRODE LAP J HOOK SPLIT STEM E-Z CLEAN 33CM -0021S

## (undated) DEVICE — SUT MONOCRYL 4-0 PS-2 27 IN Y426H

## (undated) DEVICE — TISSUE RETRIEVAL SYSTEM: Brand: INZII RETRIEVAL SYSTEM

## (undated) DEVICE — HARMONIC 1100 SHEARS, 36CM SHAFT LENGTH: Brand: HARMONIC

## (undated) DEVICE — INTENDED FOR TISSUE SEPARATION, AND OTHER PROCEDURES THAT REQUIRE A SHARP SURGICAL BLADE TO PUNCTURE OR CUT.: Brand: BARD-PARKER SAFETY BLADES SIZE 11, STERILE

## (undated) DEVICE — ACE WRAP 3 IN STERILE

## (undated) DEVICE — TROCAR APPPLE 5MM EXTENDED LENGTH

## (undated) DEVICE — LIGHT HANDLE COVER SLEEVE DISP BLUE STELLAR

## (undated) DEVICE — STRETCH BANDAGE: Brand: CURITY

## (undated) DEVICE — STERILE SURGICAL LUBRICANT,  TUBE: Brand: SURGILUBE

## (undated) DEVICE — WET SKIN PREP TRAY: Brand: MEDLINE INDUSTRIES, INC.

## (undated) DEVICE — GLOVE SRG BIOGEL 7.5

## (undated) DEVICE — DRAPE EQUIPMENT RF WAND

## (undated) DEVICE — LIGAMAX 5 MM ENDOSCOPIC MULTIPLE CLIP APPLIER: Brand: LIGAMAX

## (undated) DEVICE — STERILE BETHLEHEM PLASTIC HAND: Brand: CARDINAL HEALTH